# Patient Record
Sex: MALE | Race: WHITE | NOT HISPANIC OR LATINO | Employment: OTHER | URBAN - NONMETROPOLITAN AREA
[De-identification: names, ages, dates, MRNs, and addresses within clinical notes are randomized per-mention and may not be internally consistent; named-entity substitution may affect disease eponyms.]

---

## 2017-09-29 ENCOUNTER — OFFICE VISIT (OUTPATIENT)
Dept: INTERNAL MEDICINE | Facility: CLINIC | Age: 62
End: 2017-09-29

## 2017-09-29 VITALS
HEART RATE: 85 BPM | DIASTOLIC BLOOD PRESSURE: 60 MMHG | TEMPERATURE: 97.3 F | HEIGHT: 70 IN | SYSTOLIC BLOOD PRESSURE: 112 MMHG | WEIGHT: 252 LBS | BODY MASS INDEX: 36.08 KG/M2 | OXYGEN SATURATION: 98 %

## 2017-09-29 DIAGNOSIS — Z02.4 ENCOUNTER FOR CDL (COMMERCIAL DRIVING LICENSE) EXAM: Primary | ICD-10-CM

## 2017-09-29 DIAGNOSIS — R81 GLUCOSE FOUND IN URINE ON EXAMINATION: ICD-10-CM

## 2017-09-29 DIAGNOSIS — R73.09 ELEVATED HEMOGLOBIN A1C: ICD-10-CM

## 2017-09-29 LAB
BILIRUB BLD-MCNC: NEGATIVE MG/DL
CLARITY, POC: CLEAR
COLOR UR: ABNORMAL
GLUCOSE UR STRIP-MCNC: ABNORMAL MG/DL
HBA1C MFR BLD: 6.7 %
KETONES UR QL: NEGATIVE
LEUKOCYTE EST, POC: NEGATIVE
NITRITE UR-MCNC: NEGATIVE MG/ML
PH UR: 5 [PH] (ref 5–8)
PROT UR STRIP-MCNC: NEGATIVE MG/DL
RBC # UR STRIP: NEGATIVE /UL
SP GR UR: 1.02 (ref 1–1.03)
UROBILINOGEN UR QL: NORMAL

## 2017-09-29 PROCEDURE — 81003 URINALYSIS AUTO W/O SCOPE: CPT | Performed by: FAMILY MEDICINE

## 2017-09-29 PROCEDURE — DOTPHY: Performed by: FAMILY MEDICINE

## 2017-09-29 PROCEDURE — 83036 HEMOGLOBIN GLYCOSYLATED A1C: CPT | Performed by: FAMILY MEDICINE

## 2017-09-30 NOTE — PROGRESS NOTES
Subjective    Senate MARILYNN Capellan is a 62 y.o. 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 This year he came prepared, has a note from cardiologist saying he's okay from a heart standpoint to drive. No new health issues since last exam. 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 Normally goes to Dr. Sanchez for primary care. Has not had labs in some time.    See cdl papers in scanned media for further history.    The following portions of the patient's history were reviewed and updated as appropriate: allergies, current medications, past medical history, past social history, past surgical history and problem list.    Review of Systems   Constitutional: Negative for activity change.   HENT: Positive for hearing loss.    Eyes: Negative for visual disturbance.   Respiratory: Negative for shortness of breath.    Cardiovascular: Negative for chest pain.   Gastrointestinal: Negative for abdominal pain.   Musculoskeletal: Positive for arthralgias (toe pain, kicked something and some pain since then).   Neurological: Negative for weakness and numbness.       Vitals:    09/29/17 0826   BP: 112/60   Pulse: 85   Temp: 97.3 °F (36.3 °C)   SpO2: 98%       Objective   Physical Exam   Constitutional: He is oriented to person, place, and time. Vital signs are normal. He appears well-developed and well-nourished. He is active.  Non-toxic appearance. He does not have a sickly appearance. He does not appear ill.   Appears stated age. Well groomed. obese.   HENT:   Head: Normocephalic and atraumatic.   Right Ear: Hearing, tympanic membrane, external ear and ear canal normal.   Left Ear: Hearing, tympanic membrane, external ear and ear canal normal.   Nose: Nose normal.   Mouth/Throat: Uvula is midline, oropharynx is clear and  moist and mucous membranes are normal.   Hears a forced whisper at 5 ft bilaterally   Eyes: Conjunctivae, EOM and lids are normal. Pupils are equal, round, and reactive to light.   Wearing glasses. Passes colorblindness test. Normal peripheral field vision.   Neck: Phonation normal. Neck supple. No thyromegaly present.   Cardiovascular: Normal rate, regular rhythm and normal heart sounds.    No murmur heard.  Pulses:       Radial pulses are 2+ on the right side, and 2+ on the left side.   Pulmonary/Chest: Effort normal and breath sounds normal.   Abdominal: Soft. Bowel sounds are normal. He exhibits no distension and no mass. There is no tenderness. There is no rebound and no guarding. Hernia confirmed negative in the right inguinal area and confirmed negative in the left inguinal area.   Genitourinary: Penis normal. Right testis shows no tenderness. Left testis shows no tenderness. Circumcised.   Genitourinary Comments: Narcisa Drummond, CMA, chaperone   Musculoskeletal: Normal range of motion. He exhibits no edema or deformity.        Right hand: He exhibits no deformity.        Left hand: He exhibits no deformity.        Right foot: There is no deformity.        Left foot: There is no deformity.      During the foot exam he had a monofilament test performed.    Neurological Sensory Findings -  Unaltered sharp/dull right ankle/foot discrimination and unaltered sharp/dull left ankle/foot discrimination.    Vascular Status -  His exam exhibits no right foot edema. His exam exhibits no left foot edema.   Skin Integrity  -  His right foot skin is intact.     Senate 's left foot skin is intact. .  Lymphadenopathy:     He has no cervical adenopathy.   Neurological: He is alert and oriented to person, place, and time. He has normal strength. He displays no tremor. No cranial nerve deficit or sensory deficit (able to feel monofilament to both big toes though on the right, at the very tip, it's decreased (same foot he kicked  something with)). Gait normal.   Skin: Skin is warm and dry. He is not diaphoretic. No cyanosis. Nails show no clubbing.   Thickened, yellowed toenails.   Psychiatric: He has a normal mood and affect. His speech is normal and behavior is normal. Judgment and thought content normal. Cognition and memory are normal.   Nursing note and vitals reviewed.    Office Visit on 09/29/2017   Component Date Value Ref Range Status   • Color 09/29/2017 Dark Yellow  Yellow, Straw, Dark Yellow, Aileen Final   • Clarity, UA 09/29/2017 Clear  Clear Final   • Glucose, UA 09/29/2017 >=1000 mg/dL (3+)* Negative, 1000 mg/dL (3+) mg/dL Final   • Bilirubin 09/29/2017 Negative  Negative Final   • Ketones, UA 09/29/2017 Negative  Negative Final   • Specific Gravity  09/29/2017 1.020  1.005 - 1.030 Final   • Blood, UA 09/29/2017 Negative  Negative Final   • pH, Urine 09/29/2017 5.0  5.0 - 8.0 Final   • Protein, POC 09/29/2017 Negative  Negative mg/dL Final   • Urobilinogen, UA 09/29/2017 Normal  Normal Final   • Leukocytes 09/29/2017 Negative  Negative Final   • Nitrite, UA 09/29/2017 Negative  Negative Final   • Hemoglobin A1C 09/29/2017 6.7  % Final         Assessment/Plan   Senate was seen today for 's license exam.    Diagnoses and all orders for this visit:    Encounter for CDL (commercial driving license) exam  -     POC Urinalysis Dipstick, Automated    Glucose found in urine on examination  -     POC Glycosylated Hemoglobin (Hb A1C)    Elevated hemoglobin A1c    one year given for CDL license. Discussed elevated glucose in urine, which led to the A1C measurement. Technically a new diabetic with A1C > 6.5. I provided information on this and asked him to follow up with his PCP. The A1C needs to be reassessed in three months. I encouraged healthier dietary habits.           Cindy Zhang MD

## 2017-12-15 ENCOUNTER — OFFICE VISIT (OUTPATIENT)
Dept: INTERNAL MEDICINE | Facility: CLINIC | Age: 62
End: 2017-12-15

## 2017-12-15 VITALS
HEART RATE: 65 BPM | DIASTOLIC BLOOD PRESSURE: 80 MMHG | OXYGEN SATURATION: 98 % | WEIGHT: 235 LBS | BODY MASS INDEX: 33.64 KG/M2 | HEIGHT: 70 IN | SYSTOLIC BLOOD PRESSURE: 120 MMHG | TEMPERATURE: 97.5 F

## 2017-12-15 DIAGNOSIS — R73.09 ABNORMAL GLUCOSE: ICD-10-CM

## 2017-12-15 DIAGNOSIS — I10 ESSENTIAL HYPERTENSION: Primary | ICD-10-CM

## 2017-12-15 DIAGNOSIS — I48.20 ATRIAL FIBRILLATION, CHRONIC (HCC): ICD-10-CM

## 2017-12-15 LAB — HBA1C MFR BLD: 6.7 %

## 2017-12-15 PROCEDURE — 99214 OFFICE O/P EST MOD 30 MIN: CPT | Performed by: INTERNAL MEDICINE

## 2017-12-15 PROCEDURE — 83036 HEMOGLOBIN GLYCOSYLATED A1C: CPT | Performed by: INTERNAL MEDICINE

## 2017-12-15 NOTE — PROGRESS NOTES
"Nicholas Capelaln is a 62 y.o. male    HPI coming in for follow-up has hypertension chronic atrial fibrillation on Eliquis. Hyperglycemia however has managed some weight loss with dietary restrictions    The following portions of the patient's history were reviewed and updated as appropriate: allergies, current medications, past family history, past medical history, past social history, past surgical history, and problem list.     Review of Systems   Constitutional: Negative.  Negative for activity change, appetite change, fatigue and fever.   HENT: Negative for congestion, ear discharge, ear pain and trouble swallowing.    Eyes: Negative for photophobia and visual disturbance.   Respiratory: Negative for cough and shortness of breath.    Cardiovascular: Negative for chest pain and palpitations.   Gastrointestinal: Negative for abdominal distention, abdominal pain, constipation, diarrhea, nausea and vomiting.   Endocrine: Negative.    Genitourinary: Negative for dysuria, hematuria and urgency.   Musculoskeletal: Positive for arthralgias. Negative for back pain, joint swelling and myalgias.   Skin: Negative for color change and rash.   Allergic/Immunologic: Negative.    Neurological: Negative for dizziness, weakness, light-headedness and headaches.   Hematological: Negative for adenopathy. Does not bruise/bleed easily.   Psychiatric/Behavioral: Negative for agitation, confusion and dysphoric mood. The patient is not nervous/anxious.        Visit Vitals   • /80   • Pulse 65   • Temp 97.5 °F (36.4 °C)   • Ht 177.8 cm (70\")   • Wt 107 kg (235 lb)   • SpO2 98%   • BMI 33.72 kg/m2       Objective  Physical Exam   Constitutional: He is oriented to person, place, and time. He appears well-developed and well-nourished. No distress.   HENT:   Nose: Nose normal.   Mouth/Throat: Oropharynx is clear and moist.   Eyes: Conjunctivae and EOM are normal. No scleral icterus.   Neck: No tracheal deviation present. No " thyromegaly present.   Cardiovascular: Normal rate and regular rhythm.  Exam reveals no friction rub.    No murmur heard.  Pulmonary/Chest: No respiratory distress. He has no wheezes. He has no rales.   Abdominal: Soft. He exhibits no distension and no mass. There is no tenderness. There is no guarding.   Musculoskeletal: Normal range of motion. He exhibits no deformity.   Lymphadenopathy:     He has no cervical adenopathy.   Neurological: He is alert and oriented to person, place, and time. He has normal reflexes. No cranial nerve deficit. Coordination normal.   Skin: Skin is warm and dry. No rash noted. No erythema.   Psychiatric: He has a normal mood and affect. His behavior is normal. Judgment and thought content normal.       Diagnoses and all orders for this visit:    Essential hypertension stable with current meds and low-salt diet    Atrial fibrillation, chronic stable continue with beta blockers for rate control and anticoagulant therapy    Abnormal glucose. A1c okay continue with dietary restrictions discussed further weight loss dietary brochure is given

## 2018-01-03 ENCOUNTER — TELEPHONE (OUTPATIENT)
Dept: FAMILY MEDICINE CLINIC | Facility: CLINIC | Age: 63
End: 2018-01-03

## 2018-01-03 NOTE — TELEPHONE ENCOUNTER
----- Message from Jeff Sanchez MD sent at 1/3/2018 10:47 AM EST -----  Please inform patient labs are okay.

## 2018-06-15 ENCOUNTER — OFFICE VISIT (OUTPATIENT)
Dept: INTERNAL MEDICINE | Facility: CLINIC | Age: 63
End: 2018-06-15

## 2018-06-15 VITALS
HEART RATE: 67 BPM | SYSTOLIC BLOOD PRESSURE: 120 MMHG | TEMPERATURE: 97.5 F | BODY MASS INDEX: 32.78 KG/M2 | DIASTOLIC BLOOD PRESSURE: 70 MMHG | OXYGEN SATURATION: 98 % | HEIGHT: 70 IN | WEIGHT: 229 LBS

## 2018-06-15 DIAGNOSIS — R73.09 ABNORMAL GLUCOSE: ICD-10-CM

## 2018-06-15 DIAGNOSIS — Z23 NEED FOR VACCINATION: Primary | ICD-10-CM

## 2018-06-15 DIAGNOSIS — M25.551 PAIN OF RIGHT HIP JOINT: ICD-10-CM

## 2018-06-15 DIAGNOSIS — R35.1 NOCTURIA: ICD-10-CM

## 2018-06-15 DIAGNOSIS — I48.20 ATRIAL FIBRILLATION, CHRONIC (HCC): ICD-10-CM

## 2018-06-15 DIAGNOSIS — I10 ESSENTIAL HYPERTENSION: ICD-10-CM

## 2018-06-15 DIAGNOSIS — E78.5 DYSLIPIDEMIA: ICD-10-CM

## 2018-06-15 PROCEDURE — G0009 ADMIN PNEUMOCOCCAL VACCINE: HCPCS | Performed by: INTERNAL MEDICINE

## 2018-06-15 PROCEDURE — G0402 INITIAL PREVENTIVE EXAM: HCPCS | Performed by: INTERNAL MEDICINE

## 2018-06-15 PROCEDURE — 90670 PCV13 VACCINE IM: CPT | Performed by: INTERNAL MEDICINE

## 2018-06-15 PROCEDURE — 96160 PT-FOCUSED HLTH RISK ASSMT: CPT | Performed by: INTERNAL MEDICINE

## 2018-06-15 NOTE — PROGRESS NOTES
QUICK REFERENCE INFORMATION:  The ABCs of the Annual Wellness Visit    [place] to Medicare Visit    HEALTH RISK ASSESSMENT  63-year-old male with hypertension and chronic atrial fibrillation on anticoagulant therapy has had elevated blood sugar in the past has dyslipidemia along with right-sided hip pain secondary to DJD. He is coming in for wellness visit  1955    Recent Hospitalizations:  No hospitalization(s) within the last year..      Current Medical Providers:  Patient Care Team:  Jeff Sanchez MD as PCP - General      Smoking Status:  History   Smoking Status   • Former Smoker   Smokeless Tobacco   • Never Used       Alcohol Consumption:  History   Alcohol Use No       Depression Screen:   PHQ-2/PHQ-9 Depression Screening 6/15/2018   Little interest or pleasure in doing things 0   Feeling down, depressed, or hopeless 0   Total Score 0       Health Habits and Functional and Cognitive Screening:  Functional & Cognitive Status 6/15/2018   Do you have difficulty preparing food and eating? No   Do you have difficulty bathing yourself, getting dressed or grooming yourself? No   Do you have difficulty moving around from place to place? No   Do you have trouble with steps or getting out of a bed or a chair? No   In the past year have you fallen or experienced a near fall? No   Current Diet Well Balanced Diet   Dental Exam Not up to date   Eye Exam Up to date   Exercise (times per week) 2 times per week   Current Exercise Activities Include Cardiovasular Workout on Exercise Equipment   Do you need help using the phone?  No   Are you deaf or do you have serious difficulty hearing?  No   Do you need help with transportation? No   Do you need help shopping? No   Do you need help preparing meals?  No   Do you need help with housework?  No   Do you need help with laundry? No   Do you need help taking your medications? No   Do you need help managing money? No   Do you ever drive or ride in a car without wearing a  seat belt? No   Have you felt unusual stress, anger or loneliness in the last month? No   Who do you live with? Spouse   If you need help, do you have trouble finding someone available to you? No   Have you been bothered in the last four weeks by sexual problems? No   Do you have difficulty concentrating, remembering or making decisions? No           Does the patient have evidence of cognitive impairment? No    Aspirin use counseling? Taking ASA appropriately as indicated      Recent Lab Results:  CMP:  Lab Results   Component Value Date    BUN 21 05/08/2015    CREATININE 0.7 05/08/2015     05/08/2015    K 4.4 05/08/2015    CO2 32 (H) 05/08/2015    CALCIUM 9.6 05/08/2015    ALBUMIN 4.3 05/08/2015    BILITOT 0.6 05/08/2015    ALKPHOS 56 05/08/2015    AST 24 05/08/2015    ALT 30 05/08/2015     Lipid Panel:     HbA1c:  Lab Results   Component Value Date    HGBA1C 6.7 12/15/2017       Visual Acuity:  No exam data present    Age-appropriate Screening Schedule:  Refer to the list below for future screening recommendations based on patient's age, sex and/or medical conditions. Orders for these recommended tests are listed in the plan section. The patient has been provided with a written plan.    Health Maintenance   Topic Date Due   • TDAP/TD VACCINES (1 - Tdap) 05/11/1972   • ZOSTER VACCINE (1 of 2) 05/11/2003   • LIPID PANEL  09/30/2017   • PNEUMOCOCCAL VACCINES (65+ LOW/MEDIUM RISK) (1 of 2 - PCV13) 05/11/2018   • INFLUENZA VACCINE  08/01/2018   • COLONOSCOPY  01/07/2023        Subjective   History of Present Illness    Yehuda Capellan is a 63 y.o. male an established patient presenting for a Welcome to Medicare Visit.     The following portions of the patient's history were reviewed and updated as appropriate: allergies, current medications, past family history, past medical history, past social history, past surgical history and problem list.    Outpatient Medications Prior to Visit   Medication Sig Dispense  Refill   • aspirin 81 MG tablet Take  by mouth Daily.     • ELIQUIS 5 MG tablet tablet      • losartan-hydrochlorothiazide (HYZAAR) 100-12.5 MG per tablet Take  by mouth.     • metoprolol tartrate (LOPRESSOR) 50 MG tablet Take  by mouth.     • spironolactone (ALDACTONE) 25 MG tablet Take  by mouth.       No facility-administered medications prior to visit.        Patient Active Problem List   Diagnosis   • Abnormal glucose   • Atrial fibrillation, chronic   • Dyslipidemia   • Essential hypertension   • Joint pain, hip       Advance Care Planning:  has NO advance directive - information provided to the patient today    Identification of Risk Factors:  Risk factors include: weight , chronic pain and polypharmacy.    Review of Systems   Constitutional: Negative.  Negative for activity change, appetite change, fatigue and fever.   HENT: Negative for congestion, ear discharge, ear pain and trouble swallowing.    Eyes: Negative for photophobia and visual disturbance.   Respiratory: Negative for cough and shortness of breath.    Cardiovascular: Negative for chest pain and palpitations.   Gastrointestinal: Negative for abdominal distention, abdominal pain, constipation, diarrhea, nausea and vomiting.   Endocrine: Negative.    Genitourinary: Positive for difficulty urinating. Negative for dysuria, hematuria and urgency.   Musculoskeletal: Positive for arthralgias. Negative for back pain, joint swelling and myalgias.   Skin: Negative for color change and rash.   Allergic/Immunologic: Negative.    Neurological: Negative for dizziness, weakness, light-headedness and headaches.   Hematological: Negative for adenopathy. Does not bruise/bleed easily.   Psychiatric/Behavioral: Positive for sleep disturbance. Negative for agitation, confusion and dysphoric mood. The patient is not nervous/anxious.        Compared to one year ago, the patient feels his physical health is better.  Compared to one year ago, the patient feels his mental  "health is better.    Objective    Physical Exam   Constitutional: He is oriented to person, place, and time. He appears well-developed and well-nourished. No distress.   HENT:   Nose: Nose normal.   Mouth/Throat: Oropharynx is clear and moist.   Eyes: Conjunctivae and EOM are normal. No scleral icterus.   Neck: No tracheal deviation present. No thyromegaly present.   Cardiovascular: Normal rate and regular rhythm.  Exam reveals no friction rub.    No murmur heard.  Pulmonary/Chest: No respiratory distress. He has no wheezes. He has no rales.   Abdominal: Soft. He exhibits no distension and no mass. There is no tenderness. There is no guarding.   Musculoskeletal: Normal range of motion. He exhibits deformity.   Lymphadenopathy:     He has no cervical adenopathy.   Neurological: He is alert and oriented to person, place, and time. He has normal reflexes. No cranial nerve deficit. Coordination normal.   Skin: Skin is warm and dry. No rash noted. No erythema.   Psychiatric: He has a normal mood and affect. His behavior is normal. Judgment and thought content normal.       Vitals:    06/15/18 1301   BP: 120/70   Pulse: 67   Temp: 97.5 °F (36.4 °C)   SpO2: 98%   Weight: 104 kg (229 lb)   Height: 177.8 cm (70\")   PainSc: 0-No pain       Patient's Body mass index is 32.86 kg/m². BMI is above normal parameters. Recommendations include: exercise counseling and nutrition counseling.      Procedure   Procedures       Assessment/Plan   Patient Self-Management and Personalized Health Advice  The patient has been provided with information about: diet, exercise, weight management, fall prevention and designing advance directives and preventive services including:   · Exercise counseling provided, Fall Risk assessment done, Fall Risk plan of care done, Nutrition counseling provided.    Visit Diagnoses:    ICD-10-CM ICD-9-CM   1. Need for vaccination Z23 V05.9   2. Essential hypertension. Stable with current meds and low-salt diet  " I10 401.9   3. Dyslipidemia. Continue with the dietary restrictions  E78.5 272.4   4. Atrial fibrillation, chronic. Rate control okay continue anticoagulant therapy  I48.2 427.31   5. Abnormal glucose. Discussed diet check A1c  R73.09 790.29   6. Pain of right hip joint. Continue with home exercises NSAIDs when necessary only  M25.551 719.45       Orders Placed This Encounter   Procedures   • Pneumococcal Conjugate Vaccine 13-Valent All       Outpatient Encounter Prescriptions as of 6/15/2018   Medication Sig Dispense Refill   • aspirin 81 MG tablet Take  by mouth Daily.     • ELIQUIS 5 MG tablet tablet      • losartan-hydrochlorothiazide (HYZAAR) 100-12.5 MG per tablet Take  by mouth.     • metoprolol tartrate (LOPRESSOR) 50 MG tablet Take  by mouth.     • spironolactone (ALDACTONE) 25 MG tablet Take  by mouth.       No facility-administered encounter medications on file as of 6/15/2018.        Reviewed use of high risk medication in the elderly: yes  Reviewed for potential of harmful drug interactions in the elderly: yes    Follow Up:  No Follow-up on file.     An After Visit Summary and PPPS with all of these plans were given to the patient.

## 2018-06-15 NOTE — PATIENT INSTRUCTIONS
Medicare Wellness  Personal Prevention Plan of Service     Date of Office Visit:  06/15/2018  Encounter Provider:  Jeff Sanchez MD  Place of Service:  [facility]  Patient Name: Yehuda Capellan  :  1955    As part of the Medicare Wellness portion of your visit today, we are providing you with this personalized preventive plan of services (PPPS). This plan is based upon recommendations of the [place] Preventive Services Task Force (USPSTF) and the Advisory Committee on Immunization Practices (ACIP).    This lists the preventive care services that should be considered, and provides dates of when you are due. Items listed as completed are up-to-date and do not require any further intervention.    Health Maintenance   Topic Date Due   • TDAP/TD VACCINES (1 - Tdap) 1972   • ZOSTER VACCINE (1 of 2) 2003   • HEPATITIS C SCREENING  10/04/2016   • MEDICARE ANNUAL WELLNESS  10/04/2016   • LIPID PANEL  2017   • PNEUMOCOCCAL VACCINES (65+ LOW/MEDIUM RISK) (1 of 2 - PCV13) 2018   • AAA SCREEN (ONE-TIME)  06/15/2018   • INFLUENZA VACCINE  2018   • COLONOSCOPY  2023       Orders Placed This Encounter   Procedures   • Pneumococcal Conjugate Vaccine 13-Valent All   • Hemoglobin A1c   • PSA DIAGNOSTIC   • Comprehensive Metabolic Panel       No Follow-up on file.

## 2018-06-16 LAB
ALBUMIN SERPL-MCNC: 4.4 G/DL (ref 3.5–5)
ALBUMIN/GLOB SERPL: 1.4 G/DL (ref 1–2)
ALP SERPL-CCNC: 96 U/L (ref 38–126)
ALT SERPL-CCNC: 50 U/L (ref 13–69)
AST SERPL-CCNC: 33 U/L (ref 15–46)
BILIRUB SERPL-MCNC: 0.9 MG/DL (ref 0.2–1.3)
BUN SERPL-MCNC: 19 MG/DL (ref 7–20)
BUN/CREAT SERPL: 27.1 (ref 6.3–21.9)
CALCIUM SERPL-MCNC: 10 MG/DL (ref 8.4–10.2)
CHLORIDE SERPL-SCNC: 98 MMOL/L (ref 98–107)
CO2 SERPL-SCNC: 29 MMOL/L (ref 26–30)
CREAT SERPL-MCNC: 0.7 MG/DL (ref 0.6–1.3)
GFR SERPLBLD CREATININE-BSD FMLA CKD-EPI: 113 ML/MIN/1.73
GFR SERPLBLD CREATININE-BSD FMLA CKD-EPI: 137 ML/MIN/1.73
GLOBULIN SER CALC-MCNC: 3.1 GM/DL
GLUCOSE SERPL-MCNC: 288 MG/DL (ref 74–98)
HBA1C MFR BLD: 10.2 %
POTASSIUM SERPL-SCNC: 4.8 MMOL/L (ref 3.5–5.1)
PROT SERPL-MCNC: 7.5 G/DL (ref 6.3–8.2)
PSA SERPL-MCNC: 0.76 NG/ML (ref 0.06–4)
SODIUM SERPL-SCNC: 138 MMOL/L (ref 137–145)

## 2018-09-10 ENCOUNTER — TELEPHONE (OUTPATIENT)
Dept: INTERNAL MEDICINE | Facility: CLINIC | Age: 63
End: 2018-09-10

## 2018-09-10 ENCOUNTER — CLINICAL SUPPORT (OUTPATIENT)
Dept: INTERNAL MEDICINE | Facility: CLINIC | Age: 63
End: 2018-09-10

## 2018-09-10 VITALS
DIASTOLIC BLOOD PRESSURE: 75 MMHG | WEIGHT: 223 LBS | HEART RATE: 59 BPM | TEMPERATURE: 98 F | BODY MASS INDEX: 31.92 KG/M2 | OXYGEN SATURATION: 98 % | SYSTOLIC BLOOD PRESSURE: 120 MMHG | HEIGHT: 70 IN

## 2018-09-10 DIAGNOSIS — Z02.4 ENCOUNTER FOR CDL (COMMERCIAL DRIVING LICENSE) EXAM: Primary | ICD-10-CM

## 2018-09-10 DIAGNOSIS — E11.65 UNCONTROLLED TYPE 2 DIABETES MELLITUS WITH HYPERGLYCEMIA, WITHOUT LONG-TERM CURRENT USE OF INSULIN (HCC): Primary | ICD-10-CM

## 2018-09-10 DIAGNOSIS — E11.9 NEWLY DIAGNOSED DIABETES (HCC): ICD-10-CM

## 2018-09-10 LAB
BILIRUB BLD-MCNC: NEGATIVE MG/DL
CLARITY, POC: CLEAR
COLOR UR: YELLOW
GLUCOSE UR STRIP-MCNC: ABNORMAL MG/DL
KETONES UR QL: NEGATIVE
LEUKOCYTE EST, POC: NEGATIVE
NITRITE UR-MCNC: NEGATIVE MG/ML
PH UR: 7 [PH] (ref 5–8)
PROT UR STRIP-MCNC: NEGATIVE MG/DL
RBC # UR STRIP: NEGATIVE /UL
SP GR UR: 1 (ref 1–1.03)
UROBILINOGEN UR QL: NORMAL

## 2018-09-10 PROCEDURE — 81003 URINALYSIS AUTO W/O SCOPE: CPT | Performed by: FAMILY MEDICINE

## 2018-09-10 PROCEDURE — DOTPHY: Performed by: FAMILY MEDICINE

## 2018-09-10 RX ORDER — CETIRIZINE HYDROCHLORIDE 10 MG/1
TABLET ORAL DAILY
COMMUNITY
Start: 2018-07-23

## 2018-09-10 NOTE — PROGRESS NOTES
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 Mr. Yehuda Capellan has hypertension and afib. Yehuda Capellan's form has been reviewed and can be found in scanned media.    Review of Systems - General ROS: positive for  - sleep disturbance and weight loss  Cardiovascular ROS: no chest pain or dyspnea on exertion  Genito-Urinary ROS: positive for - nocturia and polyuria    Vitals:    09/10/18 1134   BP: 120/75   Pulse: 59   Temp: 98 °F (36.7 °C)   SpO2: 98%       Physical Exam   Constitutional: He is oriented to person, place, and time. Vital signs are normal. He appears well-developed and well-nourished. He is active.  Non-toxic appearance. He does not have a sickly appearance. He does not appear ill. No distress.   HENT:   Head: Normocephalic and atraumatic.   Right Ear: Hearing, tympanic membrane, external ear and ear canal normal.   Left Ear: Hearing, tympanic membrane, external ear and ear canal normal.   Nose: Nose normal.   Mouth/Throat: Uvula is midline, oropharynx is clear and moist and mucous membranes are normal. Abnormal dentition.   Eyes: Pupils are equal, round, and reactive to light. Conjunctivae, EOM and lids are normal.   Neck: Phonation normal. Neck supple. No thyromegaly present.   Cardiovascular: Normal rate, regular rhythm and normal heart sounds.    No murmur heard.  Pulses:       Radial pulses are 2+ on the right side, and 2+ on the left side.   Pulmonary/Chest: Effort normal and breath sounds normal.   Abdominal: Soft. Bowel sounds are normal. He exhibits no distension and no mass. There is no tenderness. There is no rebound and no guarding. Hernia confirmed negative in the right inguinal area and confirmed negative in the left inguinal area.   Musculoskeletal: Normal range of motion. He exhibits no edema or deformity.       Neurological Sensory Findings -  Unaltered sharp/dull right ankle/foot  discrimination and unaltered sharp/dull left ankle/foot discrimination.  Lymphadenopathy:     He has no cervical adenopathy.   Neurological: He is alert and oriented to person, place, and time. He has normal strength. He displays no tremor. No cranial nerve deficit or sensory deficit. Gait normal.   Skin: Skin is warm and dry. He is not diaphoretic. No cyanosis. Nails show no clubbing.   Psychiatric: He has a normal mood and affect. His speech is normal and behavior is normal. Judgment and thought content normal. Cognition and memory are normal.   Nursing note and vitals reviewed.      Brief Urine Lab Results  (Last result in the past 365 days)      Color   Clarity   Blood   Leuk Est   Nitrite   Protein   CREAT   Urine HCG        09/10/18 1156 Yellow Clear Negative Negative Negative Negative             Lab Results   Component Value Date    HGBA1C 10.20 06/15/2018         Vision: visual acuity better than 20/40, color vision intact, normal peripheral vision, with correction and no monocular vision.    Hearing: passed in both ears with forced whisper at 5 feet      Yehuda Capellan presents for CDL exam.    Meets standards, but periodic monitoring required due to hypertension, atrial fibrillation.   qualified only for 1 year.    · Follow up with PCP as needed/directed.  · See scanned form for further information.  · Data logged with [facility].    Yehuda was seen today for annual exam.    Diagnoses and all orders for this visit:    Encounter for CDL (commercial driving license) exam  -     POCT urinalysis dipstick, automated    Newly diagnosed diabetes (CMS/[facility])      Needs to follow up with PCP in regards to diabetes mellitus. New diagnosis.

## 2018-09-10 NOTE — TELEPHONE ENCOUNTER
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 Last year his A1C was mildly elevated when I did his DOT exam. I reviewed his chart and found his A1C from his last visit with you, was elevated at 10.2. Patient was not aware but asked for treatment. I instructed him to follow up with you as scheduled in December. I provided info on diabetes mellitus and lifestyle changes, diet. Discussed metformin, potential side effects. Renal function okay on last labs. Sending Rx to [facility] where he goes and Rx will be free. He's going to start on 1000 mg daily x 1 week then up to bid. Not starting insulin, this will disqualify him from further DOT renewals. I wanted to keep you in the loop on Mr. Capellan.

## 2018-09-14 ENCOUNTER — OFFICE VISIT (OUTPATIENT)
Dept: INTERNAL MEDICINE | Facility: CLINIC | Age: 63
End: 2018-09-14

## 2018-09-14 VITALS
HEIGHT: 70 IN | HEART RATE: 68 BPM | BODY MASS INDEX: 31.9 KG/M2 | WEIGHT: 222.8 LBS | TEMPERATURE: 97.4 F | OXYGEN SATURATION: 98 %

## 2018-09-14 DIAGNOSIS — E11.65 UNCONTROLLED TYPE 2 DIABETES MELLITUS WITH HYPERGLYCEMIA, WITHOUT LONG-TERM CURRENT USE OF INSULIN (HCC): ICD-10-CM

## 2018-09-14 DIAGNOSIS — I48.20 ATRIAL FIBRILLATION, CHRONIC (HCC): Primary | ICD-10-CM

## 2018-09-14 DIAGNOSIS — I10 ESSENTIAL HYPERTENSION: ICD-10-CM

## 2018-09-14 PROCEDURE — 99214 OFFICE O/P EST MOD 30 MIN: CPT | Performed by: INTERNAL MEDICINE

## 2018-09-14 NOTE — PROGRESS NOTES
"Subjective  Senjanae Capellan is a 63 y.o. male    HPI coming in for follow-up patient with atrial fibrillation and hypertension elevated A1c noted poor compliance with diet and exercise recently started on metformin which she is not taken any yet. He is accompanied by his wife was giving us some of the history    The following portions of the patient's history were reviewed and updated as appropriate: allergies, current medications, past family history, past medical history, past social history, past surgical history, and problem list.     Review of Systems   Constitutional: Positive for fatigue. Negative for activity change, appetite change and fever.   HENT: Negative for congestion, ear discharge, ear pain and trouble swallowing.    Eyes: Negative for photophobia and visual disturbance.   Respiratory: Negative for cough and shortness of breath.    Cardiovascular: Negative for chest pain and palpitations.   Gastrointestinal: Negative for abdominal distention, abdominal pain, constipation, diarrhea, nausea and vomiting.   Endocrine: Negative.    Genitourinary: Negative for dysuria, hematuria and urgency.   Musculoskeletal: Positive for arthralgias. Negative for back pain, joint swelling and myalgias.   Skin: Negative for color change and rash.   Allergic/Immunologic: Negative.    Neurological: Negative for dizziness, weakness, light-headedness and headaches.   Hematological: Negative for adenopathy. Does not bruise/bleed easily.   Psychiatric/Behavioral: Negative for agitation, confusion and dysphoric mood. The patient is not nervous/anxious.        Visit Vitals  Pulse 68   Temp 97.4 °F (36.3 °C)   Ht 177.8 cm (70\")   Wt 101 kg (222 lb 12.8 oz)   SpO2 98%   BMI 31.97 kg/m²       Objective  Physical Exam   Constitutional: He is oriented to person, place, and time. He appears well-developed and well-nourished. No distress.   HENT:   Nose: Nose normal.   Mouth/Throat: Oropharynx is clear and moist.   Eyes: Conjunctivae " and EOM are normal. No scleral icterus.   Neck: No tracheal deviation present. No thyromegaly present.   Cardiovascular: Normal rate and regular rhythm.  Exam reveals no friction rub.    No murmur heard.  Pulmonary/Chest: No respiratory distress. He has no wheezes. He has no rales.   Abdominal: Soft. He exhibits no distension and no mass. There is no tenderness. There is no guarding.   Musculoskeletal: Normal range of motion. He exhibits deformity.   Lymphadenopathy:     He has no cervical adenopathy.   Neurological: He is alert and oriented to person, place, and time. He has normal reflexes. No cranial nerve deficit. Coordination normal.   Skin: Skin is warm and dry. No rash noted. No erythema.   Psychiatric: He has a normal mood and affect. His behavior is normal. Judgment and thought content normal.       Diagnoses and all orders for this visit:    Atrial fibrillation, chronic (CMS/[facility]) rate control okay continue with anticoagulant therapy    Essential hypertension stable with current meds    Uncontrolled type 2 diabetes mellitus with hyperglycemia, without long-term current use of insulin. Counseled patient in detail along with his wife. Discussed diet brochure is given referral to diabetes education program start metformin gradually titrate dose up. Discussed exercise program and weight loss follow-up again in a month with Accu-Chek readings (CMS/[facility])

## 2018-10-15 ENCOUNTER — OFFICE VISIT (OUTPATIENT)
Dept: INTERNAL MEDICINE | Facility: CLINIC | Age: 63
End: 2018-10-15

## 2018-10-15 VITALS
SYSTOLIC BLOOD PRESSURE: 110 MMHG | OXYGEN SATURATION: 98 % | BODY MASS INDEX: 31.64 KG/M2 | HEIGHT: 70 IN | TEMPERATURE: 97.4 F | DIASTOLIC BLOOD PRESSURE: 70 MMHG | WEIGHT: 221 LBS | HEART RATE: 88 BPM

## 2018-10-15 DIAGNOSIS — I48.20 ATRIAL FIBRILLATION, CHRONIC (HCC): Primary | ICD-10-CM

## 2018-10-15 DIAGNOSIS — I10 ESSENTIAL HYPERTENSION: ICD-10-CM

## 2018-10-15 DIAGNOSIS — E11.65 CONTROLLED TYPE 2 DIABETES MELLITUS WITH HYPERGLYCEMIA, WITHOUT LONG-TERM CURRENT USE OF INSULIN (HCC): ICD-10-CM

## 2018-10-15 PROCEDURE — 99214 OFFICE O/P EST MOD 30 MIN: CPT | Performed by: INTERNAL MEDICINE

## 2018-10-15 NOTE — PROGRESS NOTES
"Subjective  Senate MARILYNN Capellan is a 63 y.o. male    HPI coming in for follow-up patient with A. fib and hypertension noted to have significantly elevated blood sugars for which she was placed on metformin he is more compliant with diet and exercise has brought in his blood sugar readings with him which show good control. He has been to the diabetes education class also    The following portions of the patient's history were reviewed and updated as appropriate: allergies, current medications, past family history, past medical history, past social history, past surgical history, and problem list.     Review of Systems   Constitutional: Positive for fatigue. Negative for activity change, appetite change and fever.   HENT: Negative for congestion, ear discharge, ear pain and trouble swallowing.    Eyes: Negative for photophobia and visual disturbance.   Respiratory: Negative for cough and shortness of breath.    Cardiovascular: Negative for chest pain and palpitations.   Gastrointestinal: Negative for abdominal distention, abdominal pain, constipation, diarrhea, nausea and vomiting.   Endocrine: Negative.    Genitourinary: Negative for dysuria, hematuria and urgency.   Musculoskeletal: Positive for arthralgias. Negative for back pain, joint swelling and myalgias.   Skin: Negative for color change and rash.   Allergic/Immunologic: Negative.    Neurological: Negative for dizziness, weakness, light-headedness and headaches.   Hematological: Negative for adenopathy. Does not bruise/bleed easily.   Psychiatric/Behavioral: Negative for agitation, confusion and dysphoric mood. The patient is not nervous/anxious.        Visit Vitals  /70   Pulse 88   Temp 97.4 °F (36.3 °C)   Ht 177.8 cm (70\")   Wt 100 kg (221 lb)   SpO2 98%   BMI 31.71 kg/m²       Objective  Physical Exam   Constitutional: He is oriented to person, place, and time. He appears well-developed and well-nourished. No distress.   HENT:   Nose: Nose normal. "   Mouth/Throat: Oropharynx is clear and moist.   Eyes: Conjunctivae and EOM are normal. No scleral icterus.   Neck: No tracheal deviation present. No thyromegaly present.   Cardiovascular: Normal rate and regular rhythm.  Exam reveals no friction rub.    No murmur heard.  Pulmonary/Chest: No respiratory distress. He has no wheezes. He has no rales.   Abdominal: Soft. He exhibits no distension and no mass. There is no tenderness. There is no guarding.   Musculoskeletal: Normal range of motion. He exhibits deformity.   Lymphadenopathy:     He has no cervical adenopathy.   Neurological: He is alert and oriented to person, place, and time. He has normal reflexes. No cranial nerve deficit. Coordination normal.   Skin: Skin is warm and dry. No rash noted. No erythema.   Psychiatric: He has a normal mood and affect. His behavior is normal. Judgment and thought content normal.       Diagnoses and all orders for this visit:    Atrial fibrillation, chronic (CMS/[facility]) rate controlled okay continue with anticoagulant therapy    Essential hypertension stable    Controlled type 2 diabetes mellitus with hyperglycemia, without long-term current use of insulin continue with the dietary restrictions. Check A1c with next visit (CMS/[facility])

## 2018-11-30 ENCOUNTER — OFFICE VISIT (OUTPATIENT)
Dept: INTERNAL MEDICINE | Facility: CLINIC | Age: 63
End: 2018-11-30

## 2018-11-30 VITALS
OXYGEN SATURATION: 98 % | DIASTOLIC BLOOD PRESSURE: 74 MMHG | BODY MASS INDEX: 31.21 KG/M2 | HEART RATE: 82 BPM | SYSTOLIC BLOOD PRESSURE: 124 MMHG | TEMPERATURE: 98.6 F | WEIGHT: 218 LBS | HEIGHT: 70 IN

## 2018-11-30 DIAGNOSIS — E11.65 UNCONTROLLED TYPE 2 DIABETES MELLITUS WITH HYPERGLYCEMIA, WITHOUT LONG-TERM CURRENT USE OF INSULIN (HCC): ICD-10-CM

## 2018-11-30 LAB — HBA1C MFR BLD: 6.4 %

## 2018-11-30 PROCEDURE — 99213 OFFICE O/P EST LOW 20 MIN: CPT | Performed by: INTERNAL MEDICINE

## 2018-11-30 NOTE — PROGRESS NOTES
"Subjective  Senjanae Capellan is a 63 y.o. male    HPI coming in for follow-up on his diabetes has been on a strict diet and brought in his Accu-Chek readings which show good control he stopped taking metformin for about 3 weeks because of diarrhea. Has atrial fibrillation with mild congestive heart failure    The following portions of the patient's history were reviewed and updated as appropriate: allergies, current medications, past family history, past medical history, past social history, past surgical history, and problem list.     Review of Systems   Constitutional: Negative.  Negative for activity change, appetite change, fatigue and fever.   HENT: Negative for congestion, ear discharge, ear pain and trouble swallowing.    Eyes: Negative for photophobia and visual disturbance.   Respiratory: Negative for cough and shortness of breath.    Cardiovascular: Negative for chest pain and palpitations.   Gastrointestinal: Negative for abdominal distention, abdominal pain, constipation, diarrhea, nausea and vomiting.   Endocrine: Negative.    Genitourinary: Negative for dysuria, hematuria and urgency.   Musculoskeletal: Positive for arthralgias. Negative for back pain, joint swelling and myalgias.   Skin: Negative for color change and rash.   Allergic/Immunologic: Negative.    Neurological: Negative for dizziness, weakness, light-headedness and headaches.   Hematological: Negative for adenopathy. Does not bruise/bleed easily.   Psychiatric/Behavioral: Negative for agitation, confusion and dysphoric mood. The patient is not nervous/anxious.        Visit Vitals  /74   Pulse 82   Temp 98.6 °F (37 °C) (Oral)   Ht 177.8 cm (70\")   Wt 98.9 kg (218 lb)   SpO2 98%   BMI 31.28 kg/m²       Objective  Physical Exam   Constitutional: He is oriented to person, place, and time. He appears well-developed and well-nourished. No distress.   HENT:   Nose: Nose normal.   Mouth/Throat: Oropharynx is clear and moist.   Eyes: " Conjunctivae and EOM are normal. No scleral icterus.   Neck: No tracheal deviation present. No thyromegaly present.   Cardiovascular: Normal rate and regular rhythm. Exam reveals no friction rub.   No murmur heard.  Pulmonary/Chest: No respiratory distress. He has no wheezes. He has no rales.   Abdominal: Soft. He exhibits no distension and no mass. There is no tenderness. There is no guarding.   Musculoskeletal: Normal range of motion. He exhibits deformity.   Lymphadenopathy:     He has no cervical adenopathy.   Neurological: He is alert and oriented to person, place, and time. He has normal reflexes. No cranial nerve deficit. Coordination normal.   Skin: Skin is warm and dry. No rash noted. No erythema.   Psychiatric: He has a normal mood and affect. His behavior is normal. Judgment and thought content normal.       Diagnoses and all orders for this visit:    Uncontrolled type 2 diabetes mellitus with hyperglycemia, without long-term current use of insulin (CMS/[facility]). Continue with the dietary restrictions resume at metformin once a day check A1c today continue with weight loss and exercise program  -     metFORMIN (GLUCOPHAGE) 1000 MG tablet; Take 1 tablet by mouth 2 (Two) Times a Day With Meals.  -     Hemoglobin A1c

## 2018-12-26 RX ORDER — BLOOD-GLUCOSE METER
KIT MISCELLANEOUS
Qty: 1 EACH | Refills: 1 | Status: SHIPPED | OUTPATIENT
Start: 2018-12-26 | End: 2021-01-18 | Stop reason: SDUPTHER

## 2019-03-20 ENCOUNTER — OFFICE VISIT (OUTPATIENT)
Dept: INTERNAL MEDICINE | Facility: CLINIC | Age: 64
End: 2019-03-20

## 2019-03-20 VITALS
TEMPERATURE: 97.9 F | WEIGHT: 219.25 LBS | HEIGHT: 70 IN | HEART RATE: 75 BPM | OXYGEN SATURATION: 98 % | DIASTOLIC BLOOD PRESSURE: 77 MMHG | SYSTOLIC BLOOD PRESSURE: 112 MMHG | BODY MASS INDEX: 31.39 KG/M2

## 2019-03-20 DIAGNOSIS — E11.65 TYPE 2 DIABETES MELLITUS WITH HYPERGLYCEMIA, WITHOUT LONG-TERM CURRENT USE OF INSULIN (HCC): Primary | ICD-10-CM

## 2019-03-20 DIAGNOSIS — I10 ESSENTIAL HYPERTENSION: ICD-10-CM

## 2019-03-20 LAB — HBA1C MFR BLD: 5.9 % (ref 4.8–5.6)

## 2019-03-20 PROCEDURE — 99213 OFFICE O/P EST LOW 20 MIN: CPT | Performed by: PHYSICIAN ASSISTANT

## 2019-03-20 NOTE — PROGRESS NOTES
Chief Complaint   Patient presents with   • Blood Sugar Problem     pt states it stays between  other than that no problems or concerns        Subjective   Senjanae Capellan is a 63 y.o. male    History of Present Illness     Patient presents today for follow-up on hypertension and diabetes.   Random blood glucose readings have been around 120-125.  He has been compliant with metformin 500 mg twice a day.  He is also been practicing diabetic diet and monitoring his carb intake.  He denies any increased thirst, increased urination, nausea or diarrhea.    Initially with a higher dose of metformin patient was having abdominal cramping and diarrhea.  He reports that with the decreased dose of metformin this has resolved.  She reports that blood pressure has been well controlled.  He has been compliant with his medications.  He denies any dizziness or headaches.    Past Medical History:   Diagnosis Date   • Atrial fibrillation (CMS/HCC)    • Elevated glucose 09/29/2017    A1C 6.7   • Hyperlipidemia      Past Surgical History:   Procedure Laterality Date   • HAND SURGERY      LEFT PINKY SEWN BACK ON      History reviewed. No pertinent family history.  Social History     Socioeconomic History   • Marital status:      Spouse name: Not on file   • Number of children: Not on file   • Years of education: Not on file   • Highest education level: Not on file   Tobacco Use   • Smoking status: Former Smoker   • Smokeless tobacco: Never Used   Substance and Sexual Activity   • Alcohol use: No   • Drug use: No     No Known Allergies      Review of Systems   Constitutional: Negative for appetite change, diaphoresis, fatigue, unexpected weight gain and unexpected weight loss.   Eyes: Negative for visual disturbance.   Respiratory: Negative for chest tightness and shortness of breath.    Cardiovascular: Negative for chest pain, palpitations and leg swelling.   Gastrointestinal: Negative for diarrhea, nausea and vomiting.    Endocrine: Negative for polydipsia, polyphagia and polyuria.   Musculoskeletal: Positive for arthralgias (chronic hip and back problems.  ).   Skin: Negative for color change.   Neurological: Negative for dizziness, weakness, light-headedness and numbness.     Objective     Vitals:    03/20/19 0941   BP: 112/77   Pulse: 75   Temp: 97.9 °F (36.6 °C)   SpO2: 98%       Physical Exam   Constitutional: He is oriented to person, place, and time. He appears well-developed and well-nourished. No distress.   HENT:   Head: Normocephalic and atraumatic.   Eyes: Conjunctivae and EOM are normal. Pupils are equal, round, and reactive to light.   Neck: Normal range of motion. Neck supple. No JVD present.   Cardiovascular: Normal rate, regular rhythm and normal heart sounds. Exam reveals no gallop and no friction rub.   No murmur heard.  Pulmonary/Chest: Effort normal and breath sounds normal. No respiratory distress. He has no wheezes. He has no rales.   Musculoskeletal: Normal range of motion. He exhibits no edema.   Neurological: He is alert and oriented to person, place, and time.   Skin: Skin is warm and dry. Capillary refill takes less than 2 seconds. He is not diaphoretic.   Psychiatric: He has a normal mood and affect. His behavior is normal.   Nursing note and vitals reviewed.      Results for orders placed or performed in visit on 03/20/19   Hemoglobin A1c   Result Value Ref Range    Hemoglobin A1C 5.90 (H) 4.80 - 5.60 %       Assessment/Plan     Senate was seen today for blood sugar problem.    Diagnoses and all orders for this visit:    Type 2 diabetes mellitus with hyperglycemia, without long-term current use of insulin (CMS/[facility])  -     Obtain A1c today.  Continue metformin twice daily.  Continue diabetic diet.  We will follow-up in 3 months to monitor A1c.  -     Hemoglobin A1c    Essential hypertension        -     Controlled.  Continue current medications.      Return in about 3 months (around 6/20/2019) for  Lilliam.    Alexandria Ramirez PA-C

## 2019-04-24 ENCOUNTER — OFFICE VISIT (OUTPATIENT)
Dept: INTERNAL MEDICINE | Facility: CLINIC | Age: 64
End: 2019-04-24

## 2019-04-24 VITALS
RESPIRATION RATE: 18 BRPM | SYSTOLIC BLOOD PRESSURE: 116 MMHG | OXYGEN SATURATION: 98 % | DIASTOLIC BLOOD PRESSURE: 72 MMHG | HEIGHT: 70 IN | BODY MASS INDEX: 30.92 KG/M2 | HEART RATE: 66 BPM | TEMPERATURE: 97.9 F | WEIGHT: 216 LBS

## 2019-04-24 DIAGNOSIS — R19.7 DIARRHEA OF PRESUMED INFECTIOUS ORIGIN: Primary | ICD-10-CM

## 2019-04-24 DIAGNOSIS — R10.9 ABDOMINAL CRAMPING: ICD-10-CM

## 2019-04-24 DIAGNOSIS — K92.1 MELENA: ICD-10-CM

## 2019-04-24 PROCEDURE — 99213 OFFICE O/P EST LOW 20 MIN: CPT | Performed by: PHYSICIAN ASSISTANT

## 2019-04-24 RX ORDER — DICYCLOMINE HCL 20 MG
20 TABLET ORAL EVERY 8 HOURS
Qty: 12 TABLET | Refills: 0 | Status: SHIPPED | OUTPATIENT
Start: 2019-04-24 | End: 2019-06-20

## 2019-04-24 NOTE — PROGRESS NOTES
Chief Complaint   Patient presents with   • Diarrhea     started on 04/20/2019   • GI Problem       Subjective   Yehuda Capellan is a 63 y.o. male    History of Present Illness     Diarrhea:  Symptoms began Friday night, patient reports that he began feeling poorly and had watery diarrhea.  He had body aches, chills, and fever Saturday.  He has continued to have watery diarrhea over that several days.  He report 4-5 small episodes of diarrhea each day.  Associated mild abdominal cramps.  He has noted a few episodes of black/dark colored stools and green colored stools.  Denies nausea/vomiting.  Symptoms are not worsening.  The stools are intermittently clumpy and watery now.  He reports that he has had several close friends with similar symptoms.  Symptoms are beginning to interfere with work, and he requests something to help alleviate the cramping.    Past Medical History:   Diagnosis Date   • Atrial fibrillation (CMS/HCC)    • Elevated glucose 09/29/2017    A1C 6.7   • Hyperlipidemia      Past Surgical History:   Procedure Laterality Date   • HAND SURGERY      LEFT PINKY SEWN BACK ON      History reviewed. No pertinent family history.  Social History     Socioeconomic History   • Marital status:      Spouse name: Not on file   • Number of children: Not on file   • Years of education: Not on file   • Highest education level: Not on file   Tobacco Use   • Smoking status: Former Smoker   • Smokeless tobacco: Never Used   Substance and Sexual Activity   • Alcohol use: No   • Drug use: No     Allergies   Allergen Reactions   • Sulfa Antibiotics Unknown (See Comments)     Patient states he had allergic reaction to sulfa drugs when he was a child.     Review of Systems   Constitutional: Positive for appetite change, chills, fatigue and fever. Negative for activity change.   Respiratory: Negative for cough and shortness of breath.    Cardiovascular: Negative for chest pain.   Gastrointestinal: Positive for  abdominal pain and diarrhea. Negative for anal bleeding, nausea and vomiting.        Melena   Musculoskeletal: Positive for arthralgias. Negative for joint swelling and myalgias.   Neurological: Negative for dizziness, light-headedness and headache.     Objective     Vitals:    04/24/19 0948   BP: 116/72   Pulse: 66   Resp: 18   Temp: 97.9 °F (36.6 °C)   SpO2: 98%       Physical Exam   Constitutional: He is oriented to person, place, and time. He appears well-developed and well-nourished. No distress.   Non-toxic appearing male patient.  Pleasant and talkative.  Energetic.    HENT:   Head: Normocephalic and atraumatic.   Eyes: Conjunctivae and EOM are normal. Pupils are equal, round, and reactive to light.   Neck: Normal range of motion. Neck supple. No JVD present.   Cardiovascular: Normal rate, regular rhythm and normal heart sounds. Exam reveals no gallop and no friction rub.   No murmur heard.  Pulmonary/Chest: Effort normal and breath sounds normal. No respiratory distress. He has no wheezes. He has no rales.   Abdominal: Soft. Bowel sounds are normal. He exhibits no distension. There is generalized tenderness.   Musculoskeletal: Normal range of motion. He exhibits no edema.   Neurological: He is alert and oriented to person, place, and time.   Skin: Skin is warm and dry. Capillary refill takes less than 2 seconds. He is not diaphoretic.   Psychiatric: He has a normal mood and affect. His behavior is normal.   Nursing note and vitals reviewed.      Assessment/Plan     Yehuda was seen today for diarrhea and gi problem.    Diagnoses and all orders for this visit:    Diarrhea of presumed infectious origin    Abdominal cramping  -     dicyclomine (BENTYL) 20 MG tablet; Take 1 tablet by mouth Every 8 (Eight) Hours.    Melena  -     POCT Occult blood x 3, stool; Future    Most likely gastroenteritis as several other friends have had similar symptoms.  Push fluids, encouraged bowel rest, bland diet, avoid spicy foods.   Can use Bentyl as needed every 8 hours for abdominal cramping.  Will obtain FOBT, as patient reports he had a few episodes of black tarry stools.  Patient has history of anticoagulation with Eliquis and takes daily aspirin.  Have advised patient that if symptoms are not improving or if they worsen return to clinic.  Consider stool studies if symptoms are persistent.    Return if symptoms worsen or fail to improve.    Alexandria Ramirez PA-C

## 2019-06-20 ENCOUNTER — OFFICE VISIT (OUTPATIENT)
Dept: INTERNAL MEDICINE | Facility: CLINIC | Age: 64
End: 2019-06-20

## 2019-06-20 VITALS
TEMPERATURE: 97 F | SYSTOLIC BLOOD PRESSURE: 111 MMHG | WEIGHT: 213 LBS | HEIGHT: 70 IN | BODY MASS INDEX: 30.49 KG/M2 | OXYGEN SATURATION: 97 % | HEART RATE: 71 BPM | DIASTOLIC BLOOD PRESSURE: 63 MMHG

## 2019-06-20 DIAGNOSIS — M25.551 PAIN OF RIGHT HIP JOINT: ICD-10-CM

## 2019-06-20 DIAGNOSIS — I48.20 ATRIAL FIBRILLATION, CHRONIC (HCC): ICD-10-CM

## 2019-06-20 DIAGNOSIS — I10 ESSENTIAL HYPERTENSION: Primary | ICD-10-CM

## 2019-06-20 DIAGNOSIS — E11.65 TYPE 2 DIABETES MELLITUS WITH HYPERGLYCEMIA, WITHOUT LONG-TERM CURRENT USE OF INSULIN (HCC): ICD-10-CM

## 2019-06-20 PROCEDURE — G0439 PPPS, SUBSEQ VISIT: HCPCS | Performed by: INTERNAL MEDICINE

## 2019-06-20 PROCEDURE — 99397 PER PM REEVAL EST PAT 65+ YR: CPT | Performed by: INTERNAL MEDICINE

## 2019-06-20 PROCEDURE — 96160 PT-FOCUSED HLTH RISK ASSMT: CPT | Performed by: INTERNAL MEDICINE

## 2019-06-20 NOTE — PATIENT INSTRUCTIONS
Medicare Wellness  Personal Prevention Plan of Service     Date of Office Visit:  2019  Encounter Provider:  Jeff Sanchez MD  Place of Service:  [facility]  Patient Name: Yehuda Capellan  :  1955    As part of the Medicare Wellness portion of your visit today, we are providing you with this personalized preventive plan of services (PPPS). This plan is based upon recommendations of the [place] Preventive Services Task Force (USPSTF) and the Advisory Committee on Immunization Practices (ACIP).    This lists the preventive care services that should be considered, and provides dates of when you are due. Items listed as completed are up-to-date and do not require any further intervention.    Health Maintenance   Topic Date Due   • URINE MICROALBUMIN  1955   • TDAP/TD VACCINES (1 - Tdap) 1972   • ZOSTER VACCINE (2 of 3) 10/24/2014   • HEPATITIS C SCREENING  10/04/2016   • DIABETIC FOOT EXAM  10/04/2016   • DIABETIC EYE EXAM  10/04/2016   • LIPID PANEL  2017   • AAA SCREEN (ONE-TIME)  06/15/2018   • MEDICARE ANNUAL WELLNESS  06/15/2019   • PNEUMOCOCCAL VACCINES (65+ LOW/MEDIUM RISK) (2 of 2 - PPSV23) 06/15/2019   • INFLUENZA VACCINE  2019   • HEMOGLOBIN A1C  2019   • COLONOSCOPY  2023       Orders Placed This Encounter   Procedures   • Hemoglobin A1c   • Comprehensive Metabolic Panel       No Follow-up on file.

## 2019-06-20 NOTE — PROGRESS NOTES
QUICK REFERENCE INFORMATION:  The ABCs of the Annual Wellness Visit    [facility]    HEALTH RISK ASSESSMENT  63-year-old male with hypertension and chronic atrial fibrillation on anticoagulant therapy has had elevated blood sugar in the past has dyslipidemia along with right-sided hip pain secondary to DJD.  He has a history of type 2 diabetes currently on metformin has managed weight loss is brought in his Accu-Chek readings which show much better control he is coming in for wellness visit  1955    Recent Hospitalizations:  No hospitalization(s) within the last year..      Current Medical Providers:  Patient Care Team:  Jeff Sanchez MD as PCP - General      Smoking Status:  Social History     Tobacco Use   Smoking Status Former Smoker   Smokeless Tobacco Never Used       Alcohol Consumption:  Social History     Substance and Sexual Activity   Alcohol Use No       Depression Screen:   PHQ-2/PHQ-9 Depression Screening 6/20/2019   Little interest or pleasure in doing things 0   Feeling down, depressed, or hopeless 0   Total Score 0       Health Habits and Functional and Cognitive Screening:  Functional & Cognitive Status 6/20/2019   Do you have difficulty preparing food and eating? No   Do you have difficulty bathing yourself, getting dressed or grooming yourself? No   Do you have difficulty using the toilet? No   Do you have difficulty moving around from place to place? Yes   Do you have trouble with steps or getting out of a bed or a chair? No   In the past year have you fallen or experienced a near fall? Yes   Current Diet Diabetic Diet   Dental Exam (No Data)   Eye Exam Not up to date   Exercise (times per week) 7 times per week   Current Exercise Activities Include Walking   Do you need help using the phone?  No   Are you deaf or do you have serious difficulty hearing?  Yes   Do you need help with transportation? No   Do you need help shopping? No   Do you need help preparing meals?  No    Do you need help with housework?  No   Do you need help with laundry? No   Do you need help taking your medications? No   Do you need help managing money? No   Do you ever drive or ride in a car without wearing a seat belt? No   Have you felt unusual stress, anger or loneliness in the last month? No   Who do you live with? Spouse   If you need help, do you have trouble finding someone available to you? No   Have you been bothered in the last four weeks by sexual problems? -   Do you have difficulty concentrating, remembering or making decisions? Yes           Does the patient have evidence of cognitive impairment? No    Aspirin use counseling? Taking ASA appropriately as indicated      Recent Lab Results:  CMP:  Lab Results   Component Value Date     (H) 06/15/2018    BUN 19 06/15/2018    CREATININE 0.70 06/15/2018    EGFRIFNONA 113 06/15/2018    EGFRIFAFRI 137 06/15/2018    BCR 27.1 (H) 06/15/2018     06/15/2018    K 4.8 06/15/2018    CO2 29.0 06/15/2018    CALCIUM 10.0 06/15/2018    PROTENTOTREF 7.5 06/15/2018    ALBUMIN 4.40 06/15/2018    LABGLOBREF 3.1 06/15/2018    LABIL2 1.4 06/15/2018    BILITOT 0.9 06/15/2018    ALKPHOS 96 06/15/2018    AST 33 06/15/2018    ALT 50 06/15/2018     Lipid Panel:     HbA1c:  Lab Results   Component Value Date    HGBA1C 5.90 (H) 03/20/2019       Visual Acuity:  No exam data present    Age-appropriate Screening Schedule:  Refer to the list below for future screening recommendations based on patient's age, sex and/or medical conditions. Orders for these recommended tests are listed in the plan section. The patient has been provided with a written plan.    Health Maintenance   Topic Date Due   • URINE MICROALBUMIN  1955   • TDAP/TD VACCINES (1 - Tdap) 05/11/1972   • ZOSTER VACCINE (2 of 3) 10/24/2014   • DIABETIC FOOT EXAM  10/04/2016   • DIABETIC EYE EXAM  10/04/2016   • LIPID PANEL  09/30/2017   • PNEUMOCOCCAL VACCINES (65+ LOW/MEDIUM RISK) (2 of 2 - PPSV23)  06/15/2019   • INFLUENZA VACCINE  08/01/2019   • HEMOGLOBIN A1C  09/20/2019   • COLONOSCOPY  01/07/2023        Subjective   History of Present Illness    Yehuda Capellan is a 64 y.o. male an established patient presenting for a Welcome to Medicare Visit.     The following portions of the patient's history were reviewed and updated as appropriate: allergies, current medications, past family history, past medical history, past social history, past surgical history and problem list.    Outpatient Medications Prior to Visit   Medication Sig Dispense Refill   • aspirin 81 MG tablet Take  by mouth Daily.     • cetirizine (zyrTEC) 10 MG tablet      • dicyclomine (BENTYL) 20 MG tablet Take 1 tablet by mouth Every 8 (Eight) Hours. 12 tablet 0   • ELIQUIS 5 MG tablet tablet      • glucose blood test strip TEST TWICE A DAY FOR DM. E11.9 200 each 3   • glucose blood test strip Use as instructed 100 each 3   • glucose monitor monitoring kit ACCU-CHECK ABISAI PLUS METER as requested. 1 each 1   • Lancets Misc. misc TEST TWICE A DAY FOR DM. E11.9 200 each 3   • losartan-hydrochlorothiazide (HYZAAR) 100-12.5 MG per tablet Take  by mouth.     • Melatonin 5 MG/15ML liquid      • metFORMIN (GLUCOPHAGE) 1000 MG tablet Take 1 tablet by mouth 2 (Two) Times a Day With Meals. 180 tablet 3   • metoprolol tartrate (LOPRESSOR) 50 MG tablet Take  by mouth.     • spironolactone (ALDACTONE) 25 MG tablet Take  by mouth.       No facility-administered medications prior to visit.        Patient Active Problem List   Diagnosis   • Atrial fibrillation, chronic (CMS/HCC) stable rate control okay on Eliquis   • Dyslipidemia continue with the current medications and dietary restrictions   • Essential hypertension stable   • Joint pain, hip with evidence of DJD on x-ray about 5 years ago continue with conservative measures for now   • Type 2 diabetes mellitus with hyperglycemia, without long-term current use of insulin (CMS/[facility]) recent A1c shows good  control       Advance Care Planning:  has NO advance directive - information provided to the patient today    Identification of Risk Factors:  Risk factors include: weight , chronic pain and polypharmacy.    Review of Systems   Constitutional: Negative.  Negative for activity change, appetite change, fatigue and fever.   HENT: Negative for congestion, ear discharge, ear pain and trouble swallowing.    Eyes: Negative for photophobia and visual disturbance.   Respiratory: Negative for cough and shortness of breath.    Cardiovascular: Negative for chest pain and palpitations.   Gastrointestinal: Negative for abdominal distention, abdominal pain, constipation, diarrhea, nausea and vomiting.   Endocrine: Negative.    Genitourinary: Positive for difficulty urinating. Negative for dysuria, hematuria and urgency.   Musculoskeletal: Positive for arthralgias. Negative for back pain, joint swelling and myalgias.   Skin: Negative for color change and rash.   Allergic/Immunologic: Negative.    Neurological: Negative for dizziness, weakness, light-headedness and headaches.   Hematological: Negative for adenopathy. Does not bruise/bleed easily.   Psychiatric/Behavioral: Positive for sleep disturbance. Negative for agitation, confusion and dysphoric mood. The patient is not nervous/anxious.        Compared to one year ago, the patient feels his physical health is better.  Compared to one year ago, the patient feels his mental health is better.    Objective    Physical Exam   Constitutional: He is oriented to person, place, and time. He appears well-developed and well-nourished. No distress.   HENT:   Nose: Nose normal.   Mouth/Throat: Oropharynx is clear and moist.   Eyes: Conjunctivae and EOM are normal. No scleral icterus.   Neck: No tracheal deviation present. No thyromegaly present.   Cardiovascular: Normal rate and regular rhythm. Exam reveals no friction rub.   No murmur heard.  Pulmonary/Chest: No respiratory distress. He  "has no wheezes. He has no rales.   Abdominal: Soft. He exhibits no distension and no mass. There is no tenderness. There is no guarding.   Musculoskeletal: Normal range of motion. He exhibits deformity.   Lymphadenopathy:     He has no cervical adenopathy.   Neurological: He is alert and oriented to person, place, and time. He has normal reflexes. No cranial nerve deficit. Coordination normal.   Skin: Skin is warm and dry. No rash noted. No erythema.   Psychiatric: He has a normal mood and affect. His behavior is normal. Judgment and thought content normal.       Vitals:    06/20/19 1051   BP: 111/63   Pulse: 71   Temp: 97 °F (36.1 °C)   SpO2: 97%   Weight: 96.6 kg (213 lb)   Height: 177.8 cm (70\")   PainSc:   6       Patient's Body mass index is 30.56 kg/m². BMI is above normal parameters. Recommendations include: exercise counseling and nutrition counseling.      Procedure   Procedures       Assessment/Plan   Patient Self-Management and Personalized Health Advice  The patient has been provided with information about: diet, exercise, weight management, fall prevention and designing advance directives and preventive services including:   · Exercise counseling provided, Fall Risk assessment done, Fall Risk plan of care done, Nutrition counseling provided.        No orders of the defined types were placed in this encounter.      Outpatient Encounter Medications as of 6/20/2019   Medication Sig Dispense Refill   • aspirin 81 MG tablet Take  by mouth Daily.     • cetirizine (zyrTEC) 10 MG tablet      • dicyclomine (BENTYL) 20 MG tablet Take 1 tablet by mouth Every 8 (Eight) Hours. 12 tablet 0   • ELIQUIS 5 MG tablet tablet      • glucose blood test strip TEST TWICE A DAY FOR DM. E11.9 200 each 3   • glucose blood test strip Use as instructed 100 each 3   • glucose monitor monitoring kit ACCU-CHECK ABISAI PLUS METER as requested. 1 each 1   • Lancets Misc. misc TEST TWICE A DAY FOR DM. E11.9 200 each 3   • " losartan-hydrochlorothiazide (HYZAAR) 100-12.5 MG per tablet Take  by mouth.     • Melatonin 5 MG/15ML liquid      • metFORMIN (GLUCOPHAGE) 1000 MG tablet Take 1 tablet by mouth 2 (Two) Times a Day With Meals. 180 tablet 3   • metoprolol tartrate (LOPRESSOR) 50 MG tablet Take  by mouth.     • spironolactone (ALDACTONE) 25 MG tablet Take  by mouth.       No facility-administered encounter medications on file as of 6/20/2019.        Reviewed use of high risk medication in the elderly: yes  Reviewed for potential of harmful drug interactions in the elderly: yes    Follow Up:  No Follow-up on file.     An After Visit Summary and PPPS with all of these plans were given to the patient.

## 2019-08-16 ENCOUNTER — OFFICE VISIT (OUTPATIENT)
Dept: INTERNAL MEDICINE | Facility: CLINIC | Age: 64
End: 2019-08-16

## 2019-08-16 VITALS
TEMPERATURE: 97.2 F | DIASTOLIC BLOOD PRESSURE: 76 MMHG | BODY MASS INDEX: 30.61 KG/M2 | SYSTOLIC BLOOD PRESSURE: 126 MMHG | HEIGHT: 70 IN | OXYGEN SATURATION: 95 % | HEART RATE: 58 BPM | WEIGHT: 213.8 LBS

## 2019-08-16 DIAGNOSIS — E11.65 TYPE 2 DIABETES MELLITUS WITH HYPERGLYCEMIA, WITHOUT LONG-TERM CURRENT USE OF INSULIN (HCC): ICD-10-CM

## 2019-08-16 DIAGNOSIS — S89.91XA INJURY OF RIGHT LOWER EXTREMITY, INITIAL ENCOUNTER: Primary | ICD-10-CM

## 2019-08-16 DIAGNOSIS — I48.20 ATRIAL FIBRILLATION, CHRONIC (HCC): ICD-10-CM

## 2019-08-16 LAB — HBA1C MFR BLD: 5.8 %

## 2019-08-16 PROCEDURE — 99214 OFFICE O/P EST MOD 30 MIN: CPT | Performed by: INTERNAL MEDICINE

## 2019-08-16 PROCEDURE — 83036 HEMOGLOBIN GLYCOSYLATED A1C: CPT | Performed by: INTERNAL MEDICINE

## 2019-08-16 NOTE — PROGRESS NOTES
Subjective  Yehuda Capellan is a 64 y.o. male    HPI coming in for evaluation he is accompanied by his wife recent accidental fall about 3 days ago with abrasion over his right shin with accompanying swelling just below that.  Has some discomfort.  He is able to bear weight easily on the leg.  He denies fever or chills.  Has been cleaning the wound regularly.  Complains of abdominal discomfort and diarrhea especially when he takes metformin.  Stopped taking it for about 2 months and symptoms have resolved.  He has type 2 diabetes is controlling this currently with diet and exercise.  He is on anticoagulant therapy for atrial fibrillation    The following portions of the patient's history were reviewed and updated as appropriate: allergies, current medications, past family history, past medical history, past social history, past surgical history, and problem list.     Review of Systems   Constitutional: Negative.  Negative for activity change, appetite change, fatigue and fever.   HENT: Negative for congestion, ear discharge, ear pain and trouble swallowing.    Eyes: Negative for photophobia and visual disturbance.   Respiratory: Negative for cough and shortness of breath.    Cardiovascular: Negative for chest pain and palpitations.   Gastrointestinal: Negative for abdominal distention, abdominal pain, constipation, diarrhea, nausea and vomiting.   Endocrine: Negative.    Genitourinary: Negative for dysuria, hematuria and urgency.   Musculoskeletal: Positive for arthralgias. Negative for back pain, joint swelling and myalgias.   Skin: Positive for rash and wound. Negative for color change.   Allergic/Immunologic: Negative.    Neurological: Negative for dizziness, weakness, light-headedness and headaches.   Hematological: Negative for adenopathy. Does not bruise/bleed easily.   Psychiatric/Behavioral: Negative for agitation, confusion and dysphoric mood. The patient is not nervous/anxious.        Visit Vitals  BP  "126/76 Comment: Automatic   Pulse 58   Temp 97.2 °F (36.2 °C) (Temporal)   Ht 177.8 cm (70\")   Wt 97 kg (213 lb 12.8 oz)   SpO2 95%   BMI 30.68 kg/m²       Objective  Physical Exam   Constitutional: He is oriented to person, place, and time. He appears well-developed and well-nourished. No distress.   HENT:   Nose: Nose normal.   Mouth/Throat: Oropharynx is clear and moist.   Eyes: Conjunctivae and EOM are normal. No scleral icterus.   Neck: No tracheal deviation present. No thyromegaly present.   Cardiovascular: Normal rate and regular rhythm. Exam reveals no friction rub.   No murmur heard.  Pulmonary/Chest: No respiratory distress. He has no wheezes. He has no rales.   Abdominal: Soft. He exhibits no distension and no mass. There is no tenderness. There is no guarding.   Musculoskeletal: Normal range of motion. He exhibits no deformity.   Lymphadenopathy:     He has no cervical adenopathy.   Neurological: He is alert and oriented to person, place, and time. He has normal reflexes. No cranial nerve deficit. Coordination normal.   Skin: Skin is warm and dry. No rash noted. No erythema.        Superficial ulcer   Psychiatric: He has a normal mood and affect. His behavior is normal. Judgment and thought content normal.       Diagnoses and all orders for this visit:    Injury of right lower extremity, initial encounter discussed wound care.  Today the wound was clean and adequately dressed in the clinic.  Has a hematoma which will be carefully observed.  Continue with anticoagulant therapy    Type 2 diabetes mellitus with hyperglycemia, without long-term current use of insulin (CMS/[facility]) A1c today shows good control continue with the dietary restrictions he is being taken off metformin    Atrial fibrillation, chronic (CMS/[facility]) rate control okay on anticoagulant therapy        "

## 2019-10-01 RX ORDER — GLIMEPIRIDE 1 MG/1
0.5 TABLET ORAL
Qty: 30 TABLET | Refills: 5 | Status: SHIPPED | OUTPATIENT
Start: 2019-10-01 | End: 2020-01-14 | Stop reason: SDUPTHER

## 2019-10-28 ENCOUNTER — FLU SHOT (OUTPATIENT)
Dept: INTERNAL MEDICINE | Facility: CLINIC | Age: 64
End: 2019-10-28

## 2019-10-28 DIAGNOSIS — Z23 NEED FOR INFLUENZA VACCINATION: Primary | ICD-10-CM

## 2019-10-28 PROCEDURE — G0008 ADMIN INFLUENZA VIRUS VAC: HCPCS | Performed by: NURSE PRACTITIONER

## 2019-10-28 PROCEDURE — 90653 IIV ADJUVANT VACCINE IM: CPT | Performed by: NURSE PRACTITIONER

## 2020-01-14 RX ORDER — GLIMEPIRIDE 1 MG/1
0.5 TABLET ORAL
Qty: 90 TABLET | Refills: 3 | Status: SHIPPED | OUTPATIENT
Start: 2020-01-14 | End: 2020-08-07 | Stop reason: SDUPTHER

## 2020-02-26 ENCOUNTER — OFFICE VISIT (OUTPATIENT)
Dept: INTERNAL MEDICINE | Facility: CLINIC | Age: 65
End: 2020-02-26

## 2020-02-26 VITALS
TEMPERATURE: 97.6 F | HEART RATE: 78 BPM | OXYGEN SATURATION: 96 % | DIASTOLIC BLOOD PRESSURE: 84 MMHG | HEIGHT: 70 IN | BODY MASS INDEX: 32.75 KG/M2 | WEIGHT: 228.8 LBS | SYSTOLIC BLOOD PRESSURE: 125 MMHG

## 2020-02-26 DIAGNOSIS — E11.65 TYPE 2 DIABETES MELLITUS WITH HYPERGLYCEMIA, WITHOUT LONG-TERM CURRENT USE OF INSULIN (HCC): ICD-10-CM

## 2020-02-26 DIAGNOSIS — Z23 NEED FOR VACCINATION: Primary | ICD-10-CM

## 2020-02-26 DIAGNOSIS — I10 ESSENTIAL HYPERTENSION: ICD-10-CM

## 2020-02-26 DIAGNOSIS — I48.20 ATRIAL FIBRILLATION, CHRONIC (HCC): ICD-10-CM

## 2020-02-26 PROCEDURE — 99214 OFFICE O/P EST MOD 30 MIN: CPT | Performed by: INTERNAL MEDICINE

## 2020-02-26 PROCEDURE — 90732 PPSV23 VACC 2 YRS+ SUBQ/IM: CPT | Performed by: INTERNAL MEDICINE

## 2020-02-26 PROCEDURE — G0009 ADMIN PNEUMOCOCCAL VACCINE: HCPCS | Performed by: INTERNAL MEDICINE

## 2020-02-26 RX ORDER — TRAZODONE HYDROCHLORIDE 50 MG/1
50 TABLET ORAL NIGHTLY
Qty: 90 TABLET | Refills: 3 | Status: SHIPPED | OUTPATIENT
Start: 2020-02-26 | End: 2020-08-07 | Stop reason: SDUPTHER

## 2020-02-26 RX ORDER — ATORVASTATIN CALCIUM 20 MG/1
TABLET, FILM COATED ORAL
COMMUNITY
Start: 2019-12-03 | End: 2020-08-07 | Stop reason: SDUPTHER

## 2020-02-26 NOTE — PROGRESS NOTES
"Subjective  Senjanae Capellan is a 64 y.o. male    HPI coming in for follow-up patient with diabetes and hypertension, dyslipidemia recent accidental fall about 2 weeks ago with pain and swelling in his left knee this appears to be improving.  Has had recent weight gain.  No alcohol or tobacco use    The following portions of the patient's history were reviewed and updated as appropriate: allergies, current medications, past family history, past medical history, past social history, past surgical history, and problem list.     Review of Systems   Constitutional: Negative.  Negative for activity change, appetite change, fatigue and fever.   HENT: Negative for congestion, ear discharge, ear pain and trouble swallowing.    Eyes: Negative for photophobia and visual disturbance.   Respiratory: Negative for cough and shortness of breath.    Cardiovascular: Negative for chest pain and palpitations.   Gastrointestinal: Negative for abdominal distention, abdominal pain, constipation, diarrhea, nausea and vomiting.   Endocrine: Negative.    Genitourinary: Negative for dysuria, hematuria and urgency.   Musculoskeletal: Positive for arthralgias. Negative for back pain, joint swelling and myalgias.        Lt knee pain   Skin: Negative for color change and rash.   Allergic/Immunologic: Negative.    Neurological: Negative for dizziness, weakness, light-headedness and headaches.   Hematological: Negative for adenopathy. Does not bruise/bleed easily.   Psychiatric/Behavioral: Positive for sleep disturbance. Negative for agitation, confusion and dysphoric mood. The patient is not nervous/anxious.        Visit Vitals  /84   Pulse 78   Temp 97.6 °F (36.4 °C) (Temporal)   Ht 177.8 cm (70\")   Wt 104 kg (228 lb 12.8 oz)   SpO2 96%   BMI 32.83 kg/m²       Objective  Physical Exam   Constitutional: He is oriented to person, place, and time. He appears well-developed and well-nourished. No distress.   HENT:   Nose: Nose normal. "   Mouth/Throat: Oropharynx is clear and moist.   Eyes: Conjunctivae and EOM are normal. No scleral icterus.   Neck: No tracheal deviation present. No thyromegaly present.   Cardiovascular: Normal rate and regular rhythm. Exam reveals no friction rub.   No murmur heard.  Pulmonary/Chest: No respiratory distress. He has no wheezes. He has no rales.   Abdominal: Soft. He exhibits no distension and no mass. There is no tenderness. There is no guarding.   Musculoskeletal: Normal range of motion. He exhibits tenderness and deformity.    Senate had a diabetic foot exam performed today.  Vascular Status -  His right foot exhibits normal foot vasculature  and no edema. His left foot exhibits normal foot vasculature  and no edema.  Skin Integrity  -  His right foot skin is intact.His left foot skin is intact..  Lymphadenopathy:     He has no cervical adenopathy.   Neurological: He is alert and oriented to person, place, and time. He has normal reflexes. No cranial nerve deficit. Coordination normal.   Skin: Skin is warm and dry. No rash noted. No erythema.   Psychiatric: He has a normal mood and affect. His behavior is normal. Judgment and thought content normal.       Diagnoses and all orders for this visit:    Need for vaccination  -     Pneumococcal Polysaccharide Vaccine 23-Valent Greater Than or Equal To 1yo Subcutaneous / IM    Type 2 diabetes mellitus with hyperglycemia, without long-term current use of insulin (CMS/[facility]) discussed dietary restrictions counseled about diet and weight loss.  Check A1c and urine for microalbumin    Atrial fibrillation, chronic rate control okay on anticoagulant therapy    Essential hypertension stable with current meds and low-salt diet    Other orders  -     atorvastatin (LIPITOR) 20 MG tablet  -     Omega-3 Fatty Acids (FISH OIL) 1200 MG capsule delayed-release; Take  by mouth.

## 2020-02-27 LAB
ALBUMIN/CREAT UR: <5 MG/G CREAT (ref 0–29)
CREAT UR-MCNC: 56.1 MG/DL
HBA1C MFR BLD: 7 % (ref 4.8–5.6)
HCV AB S/CO SERPL IA: <0.1 S/CO RATIO (ref 0–0.9)
LDLC SERPL DIRECT ASSAY-MCNC: 65 MG/DL (ref 0–99)
MICROALBUMIN UR-MCNC: <3 UG/ML

## 2020-05-26 ENCOUNTER — TELEPHONE (OUTPATIENT)
Dept: INTERNAL MEDICINE | Facility: CLINIC | Age: 65
End: 2020-05-26

## 2020-05-26 NOTE — TELEPHONE ENCOUNTER
PT'S WIFE CALLED STATING THAT THE PT WENT TO THE  URGENT CARE 5/25/20. PT WAS THERE BECAUSE HE WAS EXPERIENCING DIZZINESS, HIGH BP AND HIGH SUGAR.  PT WAS TOLD TO BE SEEN BY HIS PCP TODAY.    CANDICE'S CONTACT [phone]     PLEASE ADVISE

## 2020-05-28 ENCOUNTER — OFFICE VISIT (OUTPATIENT)
Dept: INTERNAL MEDICINE | Facility: CLINIC | Age: 65
End: 2020-05-28

## 2020-05-28 VITALS
OXYGEN SATURATION: 98 % | BODY MASS INDEX: 32.21 KG/M2 | SYSTOLIC BLOOD PRESSURE: 135 MMHG | HEART RATE: 68 BPM | HEIGHT: 70 IN | DIASTOLIC BLOOD PRESSURE: 87 MMHG | WEIGHT: 225 LBS | TEMPERATURE: 97.1 F

## 2020-05-28 DIAGNOSIS — I10 ESSENTIAL HYPERTENSION: Primary | ICD-10-CM

## 2020-05-28 DIAGNOSIS — E11.65 TYPE 2 DIABETES MELLITUS WITH HYPERGLYCEMIA, WITHOUT LONG-TERM CURRENT USE OF INSULIN (HCC): ICD-10-CM

## 2020-05-28 DIAGNOSIS — R42 VERTIGO: ICD-10-CM

## 2020-05-28 PROCEDURE — 99214 OFFICE O/P EST MOD 30 MIN: CPT | Performed by: INTERNAL MEDICINE

## 2020-05-28 NOTE — PROGRESS NOTES
Answers for HPI/ROS submitted by the patient on 5/27/2020   What is the primary reason for your visit?: Other  Please describe your symptoms.: Room spinning ,fell as result  Have you had these symptoms before?: No  How long have you been having these symptoms?: 1-4 days  Please list any medications you are currently taking for this condition.: Meclizine  Please describe any probable cause for these symptoms. : Instant care suggested vertigo  Subjective  Yehdua Capellan is a 65 y.o. male    HPI recent episode of dizziness with a sensation of room spinning around him by the time he reached the urgent care clinic his symptoms had resolved.  He has atrial fibrillation and is compliant with his anticoagulant therapy.  Recent weight gain noted he has type 2 diabetes with an A1c done in December showing reasonably good control he denies polyuria polydipsia.  No head injury currently asymptomatic    The following portions of the patient's history were reviewed and updated as appropriate: allergies, current medications, past family history, past medical history, past social history, past surgical history, and problem list.     Review of Systems   Constitutional: Negative.  Negative for activity change, appetite change, fatigue and fever.   HENT: Negative for congestion, ear discharge, ear pain and trouble swallowing.    Eyes: Negative for photophobia and visual disturbance.   Respiratory: Negative for cough and shortness of breath.    Cardiovascular: Negative for chest pain and palpitations.   Gastrointestinal: Negative for abdominal distention, abdominal pain, constipation, diarrhea, nausea and vomiting.   Endocrine: Negative.    Genitourinary: Negative for dysuria, hematuria and urgency.   Musculoskeletal: Positive for arthralgias. Negative for back pain, joint swelling and myalgias.   Skin: Negative for color change and rash.   Allergic/Immunologic: Negative.    Neurological: Negative for dizziness, weakness,  "light-headedness and headaches.   Hematological: Negative for adenopathy. Does not bruise/bleed easily.   Psychiatric/Behavioral: Positive for sleep disturbance. Negative for agitation, confusion and dysphoric mood. The patient is not nervous/anxious.        Visit Vitals  /87   Pulse 68   Temp 97.1 °F (36.2 °C) (Temporal)   Ht 177.8 cm (70\")   Wt 102 kg (225 lb)   SpO2 98%   BMI 32.28 kg/m²       Objective  Physical Exam   Constitutional: He is oriented to person, place, and time. He appears well-developed and well-nourished. No distress.   HENT:   Nose: Nose normal.   Mouth/Throat: Oropharynx is clear and moist.   Eyes: Conjunctivae and EOM are normal. No scleral icterus.   Neck: No tracheal deviation present. No thyromegaly present.   Cardiovascular: Normal rate. Exam reveals no friction rub.   No murmur heard.  Pulmonary/Chest: No respiratory distress. He has no wheezes. He has no rales.   Abdominal: Soft. He exhibits no distension and no mass. There is no tenderness. There is no guarding.   Musculoskeletal: Normal range of motion. He exhibits deformity.   Lymphadenopathy:     He has no cervical adenopathy.   Neurological: He is alert and oriented to person, place, and time. He has normal reflexes. No cranial nerve deficit. Coordination normal.   Skin: Skin is warm and dry. No rash noted. No erythema.   Psychiatric: He has a normal mood and affect. His behavior is normal. Judgment and thought content normal.       Diagnoses and all orders for this visit:    Essential hypertension stable with current meds and low-salt diet    Vertigo currently asymptomatic suspect benign positional vertigo.  Meclizine as needed    Type 2 diabetes mellitus with hyperglycemia, without long-term current use of insulin (CMS/[facility]) continue with the dietary restrictions check A1c with next visit        "

## 2020-06-25 ENCOUNTER — OFFICE VISIT (OUTPATIENT)
Dept: INTERNAL MEDICINE | Facility: CLINIC | Age: 65
End: 2020-06-25

## 2020-06-25 VITALS
SYSTOLIC BLOOD PRESSURE: 120 MMHG | HEART RATE: 58 BPM | DIASTOLIC BLOOD PRESSURE: 70 MMHG | BODY MASS INDEX: 32.18 KG/M2 | OXYGEN SATURATION: 98 % | TEMPERATURE: 98 F | HEIGHT: 70 IN | WEIGHT: 224.8 LBS

## 2020-06-25 DIAGNOSIS — E11.65 TYPE 2 DIABETES MELLITUS WITH HYPERGLYCEMIA, WITHOUT LONG-TERM CURRENT USE OF INSULIN (HCC): ICD-10-CM

## 2020-06-25 DIAGNOSIS — I48.20 ATRIAL FIBRILLATION, CHRONIC (HCC): Primary | ICD-10-CM

## 2020-06-25 DIAGNOSIS — M79.641 PAIN IN BOTH HANDS: ICD-10-CM

## 2020-06-25 DIAGNOSIS — M79.642 PAIN IN BOTH HANDS: ICD-10-CM

## 2020-06-25 PROCEDURE — 99397 PER PM REEVAL EST PAT 65+ YR: CPT | Performed by: INTERNAL MEDICINE

## 2020-06-25 PROCEDURE — G0439 PPPS, SUBSEQ VISIT: HCPCS | Performed by: INTERNAL MEDICINE

## 2020-06-25 PROCEDURE — 96160 PT-FOCUSED HLTH RISK ASSMT: CPT | Performed by: INTERNAL MEDICINE

## 2020-06-25 NOTE — PROGRESS NOTES
The ABCs of the Annual Wellness Visit  Subsequent Medicare Wellness Visit    Chief Complaint   Patient presents with   • Medicare Wellness-subsequent     arthritis in hands        Subjective   History of Present Illness:  Yehuda Capellan is a 65 y.o. male who presents for a Subsequent Medicare Wellness Visit.    HEALTH RISK ASSESSMENT    Recent Hospitalizations:  No hospitalization(s) within the last year.    Current Medical Providers:  Patient Care Team:  Jeff Sanchez MD as PCP - General    Smoking Status:  Social History     Tobacco Use   Smoking Status Former Smoker   Smokeless Tobacco Never Used       Alcohol Consumption:  Social History     Substance and Sexual Activity   Alcohol Use No       Depression Screen:   PHQ-2/PHQ-9 Depression Screening 6/25/2020   Little interest or pleasure in doing things 0   Feeling down, depressed, or hopeless 0   Total Score 0       Fall Risk Screen:  STEADI Fall Risk Assessment was completed, and patient is at MODERATE risk for falls. Assessment completed on:2/26/2020    Health Habits and Functional and Cognitive Screening:  Functional & Cognitive Status 6/25/2020   Do you have difficulty preparing food and eating? No   Do you have difficulty bathing yourself, getting dressed or grooming yourself? No   Do you have difficulty using the toilet? No   Do you have difficulty moving around from place to place? No   Do you have trouble with steps or getting out of a bed or a chair? No   Current Diet Well Balanced Diet   Dental Exam Up to date   Eye Exam Up to date   Exercise (times per week) 0 times per week   Current Exercise Activities Include No Regular Exercise   Do you need help using the phone?  No   Are you deaf or do you have serious difficulty hearing?  Yes   Do you need help with transportation? No   Do you need help shopping? No   Do you need help preparing meals?  No   Do you need help with housework?  No   Do you need help with laundry? No   Do you need help taking  your medications? No   Do you need help managing money? No   Do you ever drive or ride in a car without wearing a seat belt? No   Have you felt unusual stress, anger or loneliness in the last month? No   Who do you live with? Spouse   If you need help, do you have trouble finding someone available to you? No   Have you been bothered in the last four weeks by sexual problems? No   Do you have difficulty concentrating, remembering or making decisions? Yes         Does the patient have evidence of cognitive impairment? No    Asprin use counseling:Taking ASA appropriately as indicated    Age-appropriate Screening Schedule:  Refer to the list below for future screening recommendations based on patient's age, sex and/or medical conditions. Orders for these recommended tests are listed in the plan section. The patient has been provided with a written plan.    Health Maintenance   Topic Date Due   • DIABETIC EYE EXAM  10/04/2016   • TDAP/TD VACCINES (1 - Tdap) 02/26/2021 (Originally 5/11/1966)   • ZOSTER VACCINE (2 of 3) 08/16/2029 (Originally 10/24/2014)   • INFLUENZA VACCINE  08/01/2020   • HEMOGLOBIN A1C  08/26/2020   • DIABETIC FOOT EXAM  02/26/2021   • LIPID PANEL  02/26/2021   • URINE MICROALBUMIN  02/26/2021   • COLONOSCOPY  01/07/2023          The following portions of the patient's history were reviewed and updated as appropriate: allergies, current medications, past family history, past medical history, past social history, past surgical history and problem list.    Outpatient Medications Prior to Visit   Medication Sig Dispense Refill   • aspirin 81 MG tablet Take  by mouth Daily.     • atorvastatin (LIPITOR) 20 MG tablet      • cetirizine (zyrTEC) 10 MG tablet      • ELIQUIS 5 MG tablet tablet      • glimepiride (AMARYL) 1 MG tablet Take 0.5 tablets by mouth Every Morning Before Breakfast. 90 tablet 3   • glucose blood test strip Use as instructed 100 each 3   • glucose monitor monitoring kit ACCU-CHECK ABISAI  PLUS METER as requested. 1 each 1   • Lancets Misc. misc TEST TWICE A DAY FOR DM. E11.9 200 each 3   • losartan-hydrochlorothiazide (HYZAAR) 100-12.5 MG per tablet Take  by mouth.     • meclizine (ANTIVERT) 25 MG tablet Take 1 tablet by mouth Every 6 (Six) Hours As Needed for Dizziness. 15 tablet 0   • metoprolol tartrate (LOPRESSOR) 50 MG tablet Take  by mouth.     • Omega-3 Fatty Acids (FISH OIL) 1200 MG capsule delayed-release Take  by mouth.     • traZODone (DESYREL) 50 MG tablet Take 1 tablet by mouth Every Night. 90 tablet 3   • Melatonin 5 MG/15ML liquid        No facility-administered medications prior to visit.        Patient Active Problem List   Diagnosis   • Atrial fibrillation, chronic (CMS/HCC)   • Dyslipidemia   • Essential hypertension   • Joint pain, hip   • Type 2 diabetes mellitus with hyperglycemia, without long-term current use of insulin (CMS/[facility])       Advanced Care Planning:  ACP discussion was held with the patient during this visit. Patient has an advance directive in EMR which is still valid.     Review of Systems   Constitutional: Positive for fatigue. Negative for activity change, appetite change and fever.   HENT: Negative for congestion, ear discharge, ear pain and trouble swallowing.    Eyes: Negative for photophobia and visual disturbance.   Respiratory: Negative for cough and shortness of breath.    Cardiovascular: Negative for chest pain and palpitations.   Gastrointestinal: Negative for abdominal distention, abdominal pain, constipation, diarrhea, nausea and vomiting.   Endocrine: Negative.    Genitourinary: Negative for dysuria, hematuria and urgency.   Musculoskeletal: Positive for arthralgias and joint swelling. Negative for back pain and myalgias.   Skin: Negative for color change and rash.   Allergic/Immunologic: Negative.    Neurological: Negative for dizziness, weakness, light-headedness and headaches.   Hematological: Negative for adenopathy. Does not bruise/bleed easily.  "  Psychiatric/Behavioral: Positive for sleep disturbance. Negative for agitation, confusion and dysphoric mood. The patient is not nervous/anxious.        Compared to one year ago, the patient feels his physical health is the same.  Compared to one year ago, the patient feels his mental health is the same.    Reviewed chart for potential of high risk medication in the elderly: yes  Reviewed chart for potential of harmful drug interactions in the elderly:yes    Objective         Vitals:    06/25/20 1309   BP: 120/70   Pulse: 58   Temp: 98 °F (36.7 °C)   TempSrc: Temporal   SpO2: 98%   Weight: 102 kg (224 lb 12.8 oz)   Height: 177.8 cm (70\")   PainSc:   6   PainLoc: Hand       Body mass index is 32.26 kg/m².  Discussed the patient's BMI with him. The BMI is above average; BMI management plan is completed.    Physical Exam   Constitutional: He is oriented to person, place, and time. He appears well-developed and well-nourished. No distress.   HENT:   Nose: Nose normal.   Mouth/Throat: Oropharynx is clear and moist.   Eyes: Conjunctivae and EOM are normal. No scleral icterus.   Neck: No tracheal deviation present. No thyromegaly present.   Cardiovascular: Normal rate and regular rhythm. Exam reveals no friction rub.   No murmur heard.  Pulmonary/Chest: No respiratory distress. He has no wheezes. He has no rales.   Abdominal: Soft. He exhibits no distension and no mass. There is no tenderness. There is no guarding.   Musculoskeletal: Normal range of motion. He exhibits edema, tenderness and deformity.   Lymphadenopathy:     He has no cervical adenopathy.   Neurological: He is alert and oriented to person, place, and time. He has normal reflexes. No cranial nerve deficit. Coordination normal.   Skin: Skin is warm and dry. No rash noted. No erythema.   Psychiatric: He has a normal mood and affect. His behavior is normal. Judgment and thought content normal.             Assessment/Plan   Medicare Risks and Personalized " Health Plan  CMS Preventative Services Quick Reference  Advance Directive Discussion  Chronic Pain   Obesity/Overweight   Polypharmacy    The above risks/problems have been discussed with the patient.  Pertinent information has been shared with the patient in the After Visit Summary.  Follow up plans and orders are seen below in the Assessment/Plan Section.    Diagnoses and all orders for this visit:    1. Atrial fibrillation, chronic (CMS/[facility]) (Primary) rate control okay on anticoagulant therapy    2. Type 2 diabetes mellitus with hyperglycemia, without long-term current use of insulin (CMS/[facility]) continue with the dietary restrictions, weight loss    3. Pain in both hands has had recent exacerbations advise NSAIDs PRN only referral to orthopedics       Follow Up:  No follow-ups on file.     An After Visit Summary and PPPS were given to the patient.

## 2020-07-02 ENCOUNTER — LAB (OUTPATIENT)
Dept: LAB | Facility: HOSPITAL | Age: 65
End: 2020-07-02

## 2020-07-02 ENCOUNTER — OFFICE VISIT (OUTPATIENT)
Dept: ORTHOPEDIC SURGERY | Facility: CLINIC | Age: 65
End: 2020-07-02

## 2020-07-02 VITALS — RESPIRATION RATE: 18 BRPM | HEIGHT: 70 IN | WEIGHT: 224 LBS | BODY MASS INDEX: 32.07 KG/M2

## 2020-07-02 DIAGNOSIS — M25.542 ARTHRALGIA OF HANDS, BILATERAL: Primary | ICD-10-CM

## 2020-07-02 DIAGNOSIS — M19.049 ARTHRITIS OF HAND: ICD-10-CM

## 2020-07-02 DIAGNOSIS — M25.542 ARTHRALGIA OF HANDS, BILATERAL: ICD-10-CM

## 2020-07-02 DIAGNOSIS — M25.541 ARTHRALGIA OF HANDS, BILATERAL: Primary | ICD-10-CM

## 2020-07-02 DIAGNOSIS — M25.541 ARTHRALGIA OF HANDS, BILATERAL: ICD-10-CM

## 2020-07-02 LAB
BASOPHILS # BLD AUTO: 0.09 10*3/MM3 (ref 0–0.2)
BASOPHILS NFR BLD AUTO: 1.2 % (ref 0–1.5)
CRP SERPL-MCNC: 0.06 MG/DL (ref 0–0.5)
DEPRECATED RDW RBC AUTO: 38.7 FL (ref 37–54)
EOSINOPHIL # BLD AUTO: 0.27 10*3/MM3 (ref 0–0.4)
EOSINOPHIL NFR BLD AUTO: 3.6 % (ref 0.3–6.2)
ERYTHROCYTE [DISTWIDTH] IN BLOOD BY AUTOMATED COUNT: 12.4 % (ref 12.3–15.4)
ERYTHROCYTE [SEDIMENTATION RATE] IN BLOOD: 1 MM/HR (ref 0–20)
HCT VFR BLD AUTO: 46.1 % (ref 37.5–51)
HGB BLD-MCNC: 16.1 G/DL (ref 13–17.7)
IMM GRANULOCYTES # BLD AUTO: 0.02 10*3/MM3 (ref 0–0.05)
IMM GRANULOCYTES NFR BLD AUTO: 0.3 % (ref 0–0.5)
LYMPHOCYTES # BLD AUTO: 2.06 10*3/MM3 (ref 0.7–3.1)
LYMPHOCYTES NFR BLD AUTO: 27.3 % (ref 19.6–45.3)
MCH RBC QN AUTO: 30 PG (ref 26.6–33)
MCHC RBC AUTO-ENTMCNC: 34.9 G/DL (ref 31.5–35.7)
MCV RBC AUTO: 86 FL (ref 79–97)
MONOCYTES # BLD AUTO: 0.7 10*3/MM3 (ref 0.1–0.9)
MONOCYTES NFR BLD AUTO: 9.3 % (ref 5–12)
NEUTROPHILS NFR BLD AUTO: 4.41 10*3/MM3 (ref 1.7–7)
NEUTROPHILS NFR BLD AUTO: 58.3 % (ref 42.7–76)
NRBC BLD AUTO-RTO: 0 /100 WBC (ref 0–0.2)
PLATELET # BLD AUTO: 247 10*3/MM3 (ref 140–450)
PMV BLD AUTO: 11.4 FL (ref 6–12)
RBC # BLD AUTO: 5.36 10*6/MM3 (ref 4.14–5.8)
URATE SERPL-MCNC: 4.6 MG/DL (ref 3.4–7)
WBC # BLD AUTO: 7.55 10*3/MM3 (ref 3.4–10.8)

## 2020-07-02 PROCEDURE — 85025 COMPLETE CBC W/AUTO DIFF WBC: CPT | Performed by: ORTHOPAEDIC SURGERY

## 2020-07-02 PROCEDURE — 86140 C-REACTIVE PROTEIN: CPT | Performed by: ORTHOPAEDIC SURGERY

## 2020-07-02 PROCEDURE — 86038 ANTINUCLEAR ANTIBODIES: CPT | Performed by: ORTHOPAEDIC SURGERY

## 2020-07-02 PROCEDURE — 85652 RBC SED RATE AUTOMATED: CPT

## 2020-07-02 PROCEDURE — 86431 RHEUMATOID FACTOR QUANT: CPT | Performed by: ORTHOPAEDIC SURGERY

## 2020-07-02 PROCEDURE — 99203 OFFICE O/P NEW LOW 30 MIN: CPT | Performed by: ORTHOPAEDIC SURGERY

## 2020-07-02 PROCEDURE — 84550 ASSAY OF BLOOD/URIC ACID: CPT | Performed by: ORTHOPAEDIC SURGERY

## 2020-07-02 PROCEDURE — 36415 COLL VENOUS BLD VENIPUNCTURE: CPT | Performed by: ORTHOPAEDIC SURGERY

## 2020-07-02 NOTE — PROGRESS NOTES
Subjective   Patient ID: Yehuda Capellan is a 65 y.o. male  Pain of the Left Hand (Patient is here today for bilateral hand pain, he denies any injury or trauma. 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  Was seen by Dr. Sanchez sent here for evaluation as he has very poor movement in his left hand especially.  Denies paresthesias does have stiffness chronically in both knees right hip which she attributes to a childhood fracture, has tried a single dose of Aleve which significantly helped his pain but he is concerned about taking it giving his history of anticoagulation for atrial fibrillation.  He currently takes Tylenol for pain relief with minimal improvement, denies recent injections or surgery in either hand, has several healing sores on both hands secondary to his repetitive fence work and woodworking activities.  He does have a maternal history of rheumatoid arthritis and is concerned he may be developing that himself.      Review of Systems   Constitutional: Negative for fever.   HENT: Negative for dental problem and voice change.    Eyes: Negative for visual disturbance.   Respiratory: Negative for shortness of breath.    Cardiovascular: Negative for chest pain.   Gastrointestinal: Negative for abdominal pain.   Genitourinary: Negative for dysuria.   Musculoskeletal: Positive for arthralgias. Negative for gait problem and joint swelling.   Skin: Negative for rash.   Neurological: Negative for speech difficulty.   Hematological: Does not bruise/bleed easily.   Psychiatric/Behavioral: Negative for confusion.       Past Medical  History:   Diagnosis Date   • Atrial fibrillation (CMS/HCC)    • Elevated glucose 09/29/2017    A1C 6.7   • Hyperlipidemia         Past Surgical History:   Procedure Laterality Date   • HAND SURGERY      LEFT PINKY SEWN BACK ON        History reviewed. No pertinent family history.    Social History     Socioeconomic History   • Marital status:      Spouse name: Not on file   • Number of children: Not on file   • Years of education: Not on file   • Highest education level: Not on file   Tobacco Use   • Smoking status: Former Smoker   • Smokeless tobacco: Never Used   Substance and Sexual Activity   • Alcohol use: No   • Drug use: No   • Sexual activity: Defer       I have reviewed the medical and surgical history, family history, social history, medications, and/or allergies, and the review of systems of this report.    Allergies   Allergen Reactions   • Sulfa Antibiotics Unknown - Low Severity     Patient states he had allergic reaction to sulfa drugs when he was a child.         Current Outpatient Medications:   •  aspirin 81 MG tablet, Take  by mouth Daily., Disp: , Rfl:   •  atorvastatin (LIPITOR) 20 MG tablet, , Disp: , Rfl:   •  cetirizine (zyrTEC) 10 MG tablet, , Disp: , Rfl:   •  ELIQUIS 5 MG tablet tablet, , Disp: , Rfl:   •  glimepiride (AMARYL) 1 MG tablet, Take 0.5 tablets by mouth Every Morning Before Breakfast., Disp: 90 tablet, Rfl: 3  •  glucose blood test strip, Use as instructed, Disp: 100 each, Rfl: 3  •  glucose monitor monitoring kit, ACCU-CHECK ABISAI PLUS METER as requested., Disp: 1 each, Rfl: 1  •  Lancets Misc. misc, TEST TWICE A DAY FOR DM. E11.9, Disp: 200 each, Rfl: 3  •  losartan-hydrochlorothiazide (HYZAAR) 100-12.5 MG per tablet, Take  by mouth., Disp: , Rfl:   •  meclizine (ANTIVERT) 25 MG tablet, Take 1 tablet by mouth Every 6 (Six) Hours As Needed for Dizziness., Disp: 15 tablet, Rfl: 0  •  metoprolol tartrate (LOPRESSOR) 50 MG tablet, Take  by mouth., Disp: , Rfl:   •   "Omega-3 Fatty Acids (FISH OIL) 1200 MG capsule delayed-release, Take  by mouth., Disp: , Rfl:   •  traZODone (DESYREL) 50 MG tablet, Take 1 tablet by mouth Every Night., Disp: 90 tablet, Rfl: 3    Objective   Resp 18   Ht 177.8 cm (70\")   Wt 102 kg (224 lb)   BMI 32.14 kg/m²    Physical Exam  Constitutional: Patient is oriented to person, place, and time. Patient appears well-developed and well-nourished.   HENT:Head: Normocephalic and atraumatic.   Eyes: EOM are normal. Pupils are equal, round, and reactive to light.   Neck: Normal range of motion. Neck supple.   Cardiovascular: Normal rate.    Pulmonary/Chest: Effort normal and breath sounds normal.   Abdominal: Soft.   Neurological: Patient is alert and oriented to person, place, and time.   Skin: Skin is warm and dry.   Psychiatric: Patient has a normal mood and affect.   Nursing note and vitals reviewed.       [unfilled]   Left hand: Some tenderness diffusely across the MP and PIP joints, significant loss of flexion throughout the MP joints of the index through small fingers but no triggering of the flexor tendon sheaths no tenderness in that area, osteophyte formation is obvious around the PIP and DIP joints diffusely but no sign of significant periarticular skin breakdown.    Right hand: Some tenderness diffusely across the MP and PIP joints, significant loss of flexion throughout the MP joints of the index through small fingers but no triggering of the flexor tendon sheaths no tenderness in that area, osteophyte formation is obvious around the PIP and DIP joints diffusely but no sign of significant periarticular skin breakdown.    Assessment/Plan   Review of Radiographic Studies:    Radiographic images today of affected area I personally viewed and showed no sign of acute fracture or dislocation.      Procedures     Senate was seen today for pain and pain.    Diagnoses and all orders for this visit:    Arthralgia of hands, bilateral  -     XR Hand 3+ " View Bilateral       Orthopedic activities reviewed and patient expressed appreciation and Using illustrations and models, the nature of the pathology was explained to the patient      Recommendations/Plan:   Referral: Rheumatologist    Patient agreeable to call or return sooner for any concerns.             Impression:  Diffuse inflammatory arthropathy throughout both hands MP and PIP joints     plan:  Refer to rheumatology for further evaluation and treatment, ordered serologies, advised to take Tylenol

## 2020-07-03 LAB — CHROMATIN AB SERPL-ACNC: <10 IU/ML (ref 0–14)

## 2020-07-04 LAB — ANA SER QL: NEGATIVE

## 2020-07-08 LAB — HLA-B27 QL NAA+PROBE: NEGATIVE

## 2020-08-07 RX ORDER — APIXABAN 5 MG/1
5 TABLET, FILM COATED ORAL DAILY
Qty: 90 TABLET | Refills: 3 | Status: SHIPPED | OUTPATIENT
Start: 2020-08-07 | End: 2020-08-27 | Stop reason: SDUPTHER

## 2020-08-07 RX ORDER — METOPROLOL TARTRATE 50 MG/1
50 TABLET, FILM COATED ORAL 2 TIMES DAILY
Qty: 180 TABLET | Refills: 3 | Status: SHIPPED | OUTPATIENT
Start: 2020-08-07 | End: 2020-08-27 | Stop reason: SDUPTHER

## 2020-08-07 RX ORDER — TRAZODONE HYDROCHLORIDE 50 MG/1
50 TABLET ORAL NIGHTLY
Qty: 90 TABLET | Refills: 3 | Status: SHIPPED | OUTPATIENT
Start: 2020-08-07 | End: 2020-08-27 | Stop reason: SDUPTHER

## 2020-08-07 RX ORDER — LOSARTAN POTASSIUM AND HYDROCHLOROTHIAZIDE 12.5; 1 MG/1; MG/1
1 TABLET ORAL DAILY
Qty: 90 TABLET | Refills: 3 | Status: SHIPPED | OUTPATIENT
Start: 2020-08-07 | End: 2020-08-27 | Stop reason: SDUPTHER

## 2020-08-07 RX ORDER — GLIMEPIRIDE 1 MG/1
0.5 TABLET ORAL
Qty: 90 TABLET | Refills: 3 | Status: SHIPPED | OUTPATIENT
Start: 2020-08-07 | End: 2020-08-27 | Stop reason: SDUPTHER

## 2020-08-07 RX ORDER — ATORVASTATIN CALCIUM 20 MG/1
20 TABLET, FILM COATED ORAL NIGHTLY
Qty: 90 TABLET | Refills: 3 | Status: SHIPPED | OUTPATIENT
Start: 2020-08-07 | End: 2020-08-27 | Stop reason: SDUPTHER

## 2020-08-17 ENCOUNTER — TRANSCRIBE ORDERS (OUTPATIENT)
Dept: LAB | Facility: HOSPITAL | Age: 65
End: 2020-08-17

## 2020-08-17 ENCOUNTER — LAB (OUTPATIENT)
Dept: LAB | Facility: HOSPITAL | Age: 65
End: 2020-08-17

## 2020-08-17 DIAGNOSIS — D89.89 RADIATION CHIMERA (HCC): ICD-10-CM

## 2020-08-17 DIAGNOSIS — M15.0 PRIMARY GENERALIZED HYPERTROPHIC OSTEOARTHROSIS: ICD-10-CM

## 2020-08-17 DIAGNOSIS — L40.9 PSORIASIS: ICD-10-CM

## 2020-08-17 DIAGNOSIS — M13.0 POLYARTHROPATHY: Primary | ICD-10-CM

## 2020-08-17 DIAGNOSIS — M13.0 POLYARTHROPATHY: ICD-10-CM

## 2020-08-17 LAB
CK SERPL-CCNC: 49 U/L (ref 20–200)
CRP SERPL-MCNC: 0.13 MG/DL (ref 0–0.5)
URATE SERPL-MCNC: 4.4 MG/DL (ref 3.4–7)

## 2020-08-17 PROCEDURE — 86140 C-REACTIVE PROTEIN: CPT

## 2020-08-17 PROCEDURE — 82550 ASSAY OF CK (CPK): CPT

## 2020-08-17 PROCEDURE — 84550 ASSAY OF BLOOD/URIC ACID: CPT

## 2020-08-17 PROCEDURE — 85652 RBC SED RATE AUTOMATED: CPT

## 2020-08-17 PROCEDURE — 36415 COLL VENOUS BLD VENIPUNCTURE: CPT

## 2020-08-18 LAB — ERYTHROCYTE [SEDIMENTATION RATE] IN BLOOD: 2 MM/HR (ref 0–20)

## 2020-08-27 ENCOUNTER — TELEPHONE (OUTPATIENT)
Dept: INTERNAL MEDICINE | Facility: CLINIC | Age: 65
End: 2020-08-27

## 2020-08-27 RX ORDER — ATORVASTATIN CALCIUM 20 MG/1
20 TABLET, FILM COATED ORAL NIGHTLY
Qty: 30 TABLET | Refills: 0 | Status: SHIPPED | OUTPATIENT
Start: 2020-08-27 | End: 2021-02-15 | Stop reason: SDUPTHER

## 2020-08-27 RX ORDER — TRAZODONE HYDROCHLORIDE 50 MG/1
50 TABLET ORAL NIGHTLY
Qty: 30 TABLET | Refills: 0 | Status: SHIPPED | OUTPATIENT
Start: 2020-08-27 | End: 2021-04-01

## 2020-08-27 RX ORDER — GLIMEPIRIDE 1 MG/1
0.5 TABLET ORAL
Qty: 30 TABLET | Refills: 0 | Status: SHIPPED | OUTPATIENT
Start: 2020-08-27 | End: 2020-10-21 | Stop reason: SDUPTHER

## 2020-08-27 RX ORDER — METOPROLOL TARTRATE 50 MG/1
50 TABLET, FILM COATED ORAL 2 TIMES DAILY
Qty: 30 TABLET | Refills: 0 | Status: SHIPPED | OUTPATIENT
Start: 2020-08-27 | End: 2021-06-17 | Stop reason: SDUPTHER

## 2020-08-27 RX ORDER — APIXABAN 5 MG/1
5 TABLET, FILM COATED ORAL DAILY
Qty: 30 TABLET | Refills: 0 | Status: SHIPPED | OUTPATIENT
Start: 2020-08-27 | End: 2020-09-28 | Stop reason: SDUPTHER

## 2020-08-27 RX ORDER — LOSARTAN POTASSIUM AND HYDROCHLOROTHIAZIDE 12.5; 1 MG/1; MG/1
1 TABLET ORAL DAILY
Qty: 30 TABLET | Refills: 0 | Status: SHIPPED | OUTPATIENT
Start: 2020-08-27 | End: 2021-07-09 | Stop reason: SDUPTHER

## 2020-08-27 NOTE — TELEPHONE ENCOUNTER
I spoke with Armando at [facility] - they are waiting for payment before sending out the scripts.     I gave Santy the number [phone]. He is going to call them. All meds have been sent to [facility] for a 30 day supply until the mail order scripts get sent out

## 2020-08-27 NOTE — TELEPHONE ENCOUNTER
Spoke with patient, he is not happy with the mail order pharmacy and request all future scripts be sent to [facility]

## 2020-08-27 NOTE — TELEPHONE ENCOUNTER
PT ASKED IF LENCHO COULD CALL HIM BACK.  PT STATED THAT HE WAS SPEAKING TO HER ABOUT INS THIS MORNING.    PT CONTACT [phone]

## 2020-09-28 ENCOUNTER — OFFICE VISIT (OUTPATIENT)
Dept: INTERNAL MEDICINE | Facility: CLINIC | Age: 65
End: 2020-09-28

## 2020-09-28 VITALS
TEMPERATURE: 96.6 F | HEART RATE: 54 BPM | OXYGEN SATURATION: 98 % | HEIGHT: 70 IN | BODY MASS INDEX: 31.23 KG/M2 | WEIGHT: 218.12 LBS | DIASTOLIC BLOOD PRESSURE: 70 MMHG | SYSTOLIC BLOOD PRESSURE: 120 MMHG

## 2020-09-28 DIAGNOSIS — E11.65 TYPE 2 DIABETES MELLITUS WITH HYPERGLYCEMIA, WITHOUT LONG-TERM CURRENT USE OF INSULIN (HCC): ICD-10-CM

## 2020-09-28 DIAGNOSIS — Z23 NEED FOR VACCINATION: Primary | ICD-10-CM

## 2020-09-28 DIAGNOSIS — I10 ESSENTIAL HYPERTENSION: ICD-10-CM

## 2020-09-28 DIAGNOSIS — I48.20 ATRIAL FIBRILLATION, CHRONIC (HCC): ICD-10-CM

## 2020-09-28 LAB — HBA1C MFR BLD: 10.6 %

## 2020-09-28 PROCEDURE — 83036 HEMOGLOBIN GLYCOSYLATED A1C: CPT | Performed by: INTERNAL MEDICINE

## 2020-09-28 PROCEDURE — 99214 OFFICE O/P EST MOD 30 MIN: CPT | Performed by: INTERNAL MEDICINE

## 2020-09-28 PROCEDURE — 90694 VACC AIIV4 NO PRSRV 0.5ML IM: CPT | Performed by: INTERNAL MEDICINE

## 2020-09-28 PROCEDURE — G0008 ADMIN INFLUENZA VIRUS VAC: HCPCS | Performed by: INTERNAL MEDICINE

## 2020-09-28 RX ORDER — APIXABAN 5 MG/1
5 TABLET, FILM COATED ORAL EVERY 12 HOURS SCHEDULED
Qty: 180 TABLET | Refills: 3 | Status: SHIPPED | OUTPATIENT
Start: 2020-09-28 | End: 2021-01-18 | Stop reason: SDUPTHER

## 2020-09-28 RX ORDER — FOLIC ACID 1 MG/1
1000 TABLET ORAL DAILY
COMMUNITY
Start: 2020-09-15 | End: 2020-11-19

## 2020-09-28 RX ORDER — PREDNISONE 10 MG/1
TABLET ORAL
COMMUNITY
Start: 2020-09-15 | End: 2020-11-19

## 2020-09-28 NOTE — PROGRESS NOTES
"Subjective  Senjanae Capellan is a 65 y.o. male    HPI coming in for follow-up patient with SALUD mejia with hypertension and dyslipidemia has type 2 diabetes recently placed on prednisone for suspected seronegative rheumatoid arthritis.  Non-smoker compliant with his diet and exercise    The following portions of the patient's history were reviewed and updated as appropriate: allergies, current medications, past family history, past medical history, past social history, past surgical history, and problem list.     Review of Systems   Constitutional: Positive for fatigue. Negative for activity change, appetite change and fever.   HENT: Negative for congestion, ear discharge, ear pain and trouble swallowing.    Eyes: Negative for photophobia and visual disturbance.   Respiratory: Negative for cough and shortness of breath.    Cardiovascular: Negative for chest pain and palpitations.   Gastrointestinal: Negative for abdominal distention, abdominal pain, constipation, diarrhea, nausea and vomiting.   Endocrine: Negative.    Genitourinary: Negative for dysuria, hematuria and urgency.   Musculoskeletal: Positive for arthralgias and joint swelling. Negative for back pain and myalgias.   Skin: Negative for color change and rash.   Allergic/Immunologic: Negative.    Neurological: Negative for dizziness, weakness, light-headedness and headaches.   Hematological: Negative for adenopathy. Does not bruise/bleed easily.   Psychiatric/Behavioral: Negative for agitation, confusion and dysphoric mood. The patient is not nervous/anxious.        Visit Vitals  /70   Pulse 54   Temp 96.6 °F (35.9 °C) (Temporal)   Ht 177.8 cm (70\")   Wt 98.9 kg (218 lb 1.9 oz)   SpO2 98%   BMI 31.30 kg/m²       Objective  Physical Exam  Constitutional:       General: He is not in acute distress.     Appearance: He is well-developed.   HENT:      Nose: Nose normal.   Eyes:      General: No scleral icterus.     Conjunctiva/sclera: Conjunctivae normal. "   Neck:      Thyroid: No thyromegaly.      Trachea: No tracheal deviation.   Cardiovascular:      Rate and Rhythm: Normal rate and regular rhythm.      Heart sounds: No murmur. No friction rub.   Pulmonary:      Effort: No respiratory distress.      Breath sounds: No wheezing or rales.   Abdominal:      General: There is no distension.      Palpations: Abdomen is soft. There is no mass.      Tenderness: There is no abdominal tenderness. There is no guarding.   Musculoskeletal: Normal range of motion.         General: Swelling and deformity present.   Lymphadenopathy:      Cervical: No cervical adenopathy.   Skin:     General: Skin is warm and dry.      Findings: No erythema or rash.   Neurological:      Mental Status: He is alert and oriented to person, place, and time.      Cranial Nerves: No cranial nerve deficit.      Coordination: Coordination normal.      Deep Tendon Reflexes: Reflexes are normal and symmetric.   Psychiatric:         Behavior: Behavior normal.         Thought Content: Thought content normal.         Judgment: Judgment normal.         Diagnoses and all orders for this visit:    Need for vaccination  -     Fluad Quad 65+ yrs ([id number])    Type 2 diabetes mellitus with hyperglycemia, without long-term current use of insulin (CMS/[facility]) continue with the dietary restrictions have advised him to discontinue the prednisone because of its hyperglycemic effect his pain control is okay    Atrial fibrillation, chronic (CMS/[facility]) rate control okay on anticoagulant therapy    Essential hypertension stable with current meds and low-salt diet    Other orders  -     folic acid (FOLVITE) 1 MG tablet; Take 1,000 mcg by mouth Daily.  -     methotrexate 2.5 MG tablet; TAKE 4 TABLETS BY MOUTH WEEKLY  -     predniSONE (DELTASONE) 10 MG tablet; TAKE ONE TABLET BY MOUTH DAILY AS NEEDED FOR 2-5 DAYS FOR A FLARE UP, MAY REPEAT ONCE A WEEK  -     Eliquis 5 MG tablet tablet; Take 1 tablet by mouth Every 12 (Twelve)  Hours.

## 2020-10-20 ENCOUNTER — TELEPHONE (OUTPATIENT)
Dept: INTERNAL MEDICINE | Facility: CLINIC | Age: 65
End: 2020-10-20

## 2020-10-20 NOTE — TELEPHONE ENCOUNTER
Patient came in to the office today to discuss glucose numbers staying around 230 - he is feeling weak and tired     He wants to know if there should be a change / increase in medication     Please advise

## 2020-10-21 RX ORDER — GLIMEPIRIDE 1 MG/1
1 TABLET ORAL
Qty: 90 TABLET | Refills: 3 | Status: SHIPPED | OUTPATIENT
Start: 2020-10-21 | End: 2020-11-19

## 2020-11-02 ENCOUNTER — TELEPHONE (OUTPATIENT)
Dept: INTERNAL MEDICINE | Facility: CLINIC | Age: 65
End: 2020-11-02

## 2020-11-02 NOTE — TELEPHONE ENCOUNTER
Patient called and stated that his blood sugar numbers are not going down after being put on medication and would like a call back at [phone].    Please advise

## 2020-11-17 ENCOUNTER — TELEPHONE (OUTPATIENT)
Dept: INTERNAL MEDICINE | Facility: CLINIC | Age: 65
End: 2020-11-17

## 2020-11-17 NOTE — TELEPHONE ENCOUNTER
Called patient we lost connection    Called patient back and left a detailed voice mail asking him how he is feeling.    Offered an appointmet for him to come in and see Dr. Sanchez this week to discuss options     Ok for [facility] to deliver

## 2020-11-17 NOTE — TELEPHONE ENCOUNTER
Patient called concerning his blood glucose levels. He states he called 2 weeks ago but never got a return call. He states his blood sugars are ranging about 279-299 and is concerned its to high. He would like a call back today.

## 2020-11-19 ENCOUNTER — OFFICE VISIT (OUTPATIENT)
Dept: INTERNAL MEDICINE | Facility: CLINIC | Age: 65
End: 2020-11-19

## 2020-11-19 VITALS
OXYGEN SATURATION: 98 % | HEART RATE: 52 BPM | HEIGHT: 70 IN | BODY MASS INDEX: 31.32 KG/M2 | SYSTOLIC BLOOD PRESSURE: 110 MMHG | TEMPERATURE: 97.8 F | WEIGHT: 218.8 LBS | DIASTOLIC BLOOD PRESSURE: 70 MMHG

## 2020-11-19 DIAGNOSIS — E11.65 TYPE 2 DIABETES MELLITUS WITH HYPERGLYCEMIA, WITHOUT LONG-TERM CURRENT USE OF INSULIN (HCC): ICD-10-CM

## 2020-11-19 DIAGNOSIS — I10 ESSENTIAL HYPERTENSION: ICD-10-CM

## 2020-11-19 DIAGNOSIS — I48.20 ATRIAL FIBRILLATION, CHRONIC (HCC): Primary | ICD-10-CM

## 2020-11-19 DIAGNOSIS — E53.8 B12 DEFICIENCY: ICD-10-CM

## 2020-11-19 LAB
ALBUMIN SERPL-MCNC: 4.5 G/DL (ref 3.5–5.2)
ALBUMIN/GLOB SERPL: 1.9 G/DL
ALP SERPL-CCNC: 80 U/L (ref 39–117)
ALT SERPL-CCNC: 28 U/L (ref 1–41)
AST SERPL-CCNC: 17 U/L (ref 1–40)
BILIRUB SERPL-MCNC: 0.9 MG/DL (ref 0–1.2)
BUN SERPL-MCNC: 22 MG/DL (ref 8–23)
BUN/CREAT SERPL: 24.7 (ref 7–25)
CALCIUM SERPL-MCNC: 9.9 MG/DL (ref 8.6–10.5)
CHLORIDE SERPL-SCNC: 99 MMOL/L (ref 98–107)
CO2 SERPL-SCNC: 27.9 MMOL/L (ref 22–29)
CREAT SERPL-MCNC: 0.89 MG/DL (ref 0.76–1.27)
ERYTHROCYTE [DISTWIDTH] IN BLOOD BY AUTOMATED COUNT: 12.2 % (ref 12.3–15.4)
GLOBULIN SER CALC-MCNC: 2.4 GM/DL
GLUCOSE SERPL-MCNC: 268 MG/DL (ref 65–99)
HCT VFR BLD AUTO: 48.9 % (ref 37.5–51)
HGB BLD-MCNC: 16.8 G/DL (ref 13–17.7)
MCH RBC QN AUTO: 30.3 PG (ref 26.6–33)
MCHC RBC AUTO-ENTMCNC: 34.4 G/DL (ref 31.5–35.7)
MCV RBC AUTO: 88.1 FL (ref 79–97)
PLATELET # BLD AUTO: 276 10*3/MM3 (ref 140–450)
POTASSIUM SERPL-SCNC: 5 MMOL/L (ref 3.5–5.2)
PROT SERPL-MCNC: 6.9 G/DL (ref 6–8.5)
RBC # BLD AUTO: 5.55 10*6/MM3 (ref 4.14–5.8)
SODIUM SERPL-SCNC: 138 MMOL/L (ref 136–145)
TSH SERPL DL<=0.005 MIU/L-ACNC: 1.29 UIU/ML (ref 0.27–4.2)
VIT B12 SERPL-MCNC: 662 PG/ML (ref 211–946)
WBC # BLD AUTO: 9.17 10*3/MM3 (ref 3.4–10.8)

## 2020-11-19 PROCEDURE — 99214 OFFICE O/P EST MOD 30 MIN: CPT | Performed by: INTERNAL MEDICINE

## 2020-11-19 RX ORDER — GLIMEPIRIDE 2 MG/1
2 TABLET ORAL
Qty: 90 TABLET | Refills: 3 | Status: SHIPPED | OUTPATIENT
Start: 2020-11-19 | End: 2020-11-25 | Stop reason: SDUPTHER

## 2020-11-19 NOTE — PROGRESS NOTES
"Subjective  Senjanae Capellan is a 65 y.o. male    HPI in for follow-up he is accompanied by his wife was giving us some of the history.  Patient had a history of A. fib, hypertension and dyslipidemia has had diabetes with poor control noted lately.  He had stopped taking Metformin due to complaints of loose stools.  Now that he has stopped working as a  he is okay to resume Metformin    The following portions of the patient's history were reviewed and updated as appropriate: allergies, current medications, past family history, past medical history, past social history, past surgical history, and problem list.     Review of Systems   Constitutional: Positive for fatigue. Negative for activity change, appetite change and fever.   HENT: Negative for congestion, ear discharge, ear pain and trouble swallowing.    Eyes: Negative for photophobia and visual disturbance.   Respiratory: Negative for cough and shortness of breath.    Cardiovascular: Negative for chest pain and palpitations.   Gastrointestinal: Negative for abdominal distention, abdominal pain, constipation, diarrhea, nausea and vomiting.   Endocrine: Negative.    Genitourinary: Negative for dysuria, hematuria and urgency.   Musculoskeletal: Positive for arthralgias, gait problem and joint swelling. Negative for back pain and myalgias.   Skin: Negative for color change and rash.   Allergic/Immunologic: Negative.    Neurological: Negative for dizziness, weakness, light-headedness and headaches.   Hematological: Negative for adenopathy. Does not bruise/bleed easily.   Psychiatric/Behavioral: Negative for agitation, confusion and dysphoric mood. The patient is not nervous/anxious.        Visit Vitals  /70   Pulse 52   Temp 97.8 °F (36.6 °C) (Temporal)   Ht 177.8 cm (70\")   Wt 99.2 kg (218 lb 12.8 oz)   SpO2 98%   BMI 31.39 kg/m²       Objective  Physical Exam  Constitutional:       General: He is not in acute distress.     Appearance: He is " well-developed.   HENT:      Nose: Nose normal.   Eyes:      General: No scleral icterus.     Conjunctiva/sclera: Conjunctivae normal.   Neck:      Thyroid: No thyromegaly.      Trachea: No tracheal deviation.   Cardiovascular:      Rate and Rhythm: Normal rate and regular rhythm.      Heart sounds: No murmur. No friction rub.   Pulmonary:      Effort: No respiratory distress.      Breath sounds: No wheezing or rales.   Abdominal:      General: There is no distension.      Palpations: Abdomen is soft. There is no mass.      Tenderness: There is no abdominal tenderness. There is no guarding.   Musculoskeletal: Normal range of motion.         General: Deformity present.   Lymphadenopathy:      Cervical: No cervical adenopathy.   Skin:     General: Skin is warm and dry.      Findings: No erythema or rash.   Neurological:      Mental Status: He is alert and oriented to person, place, and time.      Cranial Nerves: No cranial nerve deficit.      Coordination: Coordination normal.      Deep Tendon Reflexes: Reflexes are normal and symmetric.   Psychiatric:         Behavior: Behavior normal.         Thought Content: Thought content normal.         Judgment: Judgment normal.         Diagnoses and all orders for this visit:    Atrial fibrillation, chronic (CMS/HCC) stable continue with beta-blockers and the anticoagulant therapy  -     Comprehensive Metabolic Panel  -     CBC (No Diff)  -     TSH    Type 2 diabetes mellitus with hyperglycemia, without long-term current use of insulin (CMS/HCC) resume Metformin at 1000 mg twice a day with food continue with glimepiride at 2 mg daily dietary counseling and exercise program discussed again    Essential hypertension able with current meds and low-salt diet    B12 deficiency with history of Metformin therapy.  Check levels  -     Vitamin B12    Other orders  -     glimepiride (Amaryl) 2 MG tablet; Take 1 tablet by mouth Every Morning Before Breakfast.

## 2020-11-25 RX ORDER — GLIMEPIRIDE 2 MG/1
2 TABLET ORAL
Qty: 90 TABLET | Refills: 3 | Status: SHIPPED | OUTPATIENT
Start: 2020-11-25 | End: 2020-12-21 | Stop reason: SDUPTHER

## 2020-12-21 NOTE — TELEPHONE ENCOUNTER
Caller: Yehuda Capellan    Relationship: Self    Best call back number: [phone]    Medication needed:   Requested Prescriptions     Pending Prescriptions Disp Refills   • glimepiride (Amaryl) 2 MG tablet 90 tablet 3     Sig: Take 1 tablet by mouth Every Morning Before Breakfast.       When do you need the refill by: 12/22/20     Does the patient have less than a 3 day supply:  [x] Yes  [] No    What is the patient's preferred pharmacy:    [facility] - [place] [address] [address] - [phone]  - [phone] FX

## 2020-12-22 RX ORDER — GLIMEPIRIDE 2 MG/1
2 TABLET ORAL
Qty: 90 TABLET | Refills: 3 | Status: SHIPPED | OUTPATIENT
Start: 2020-12-22 | End: 2021-01-18 | Stop reason: SDUPTHER

## 2021-01-08 ENCOUNTER — OFFICE VISIT (OUTPATIENT)
Dept: INTERNAL MEDICINE | Facility: CLINIC | Age: 66
End: 2021-01-08

## 2021-01-08 VITALS
DIASTOLIC BLOOD PRESSURE: 70 MMHG | HEIGHT: 70 IN | HEART RATE: 82 BPM | TEMPERATURE: 97.1 F | BODY MASS INDEX: 31.64 KG/M2 | SYSTOLIC BLOOD PRESSURE: 110 MMHG | WEIGHT: 221 LBS | OXYGEN SATURATION: 98 %

## 2021-01-08 DIAGNOSIS — I48.20 ATRIAL FIBRILLATION, CHRONIC (HCC): Primary | ICD-10-CM

## 2021-01-08 DIAGNOSIS — E11.65 TYPE 2 DIABETES MELLITUS WITH HYPERGLYCEMIA, WITHOUT LONG-TERM CURRENT USE OF INSULIN (HCC): ICD-10-CM

## 2021-01-08 DIAGNOSIS — I10 ESSENTIAL HYPERTENSION: ICD-10-CM

## 2021-01-08 LAB — HBA1C MFR BLD: 6.7 %

## 2021-01-08 PROCEDURE — 83036 HEMOGLOBIN GLYCOSYLATED A1C: CPT | Performed by: INTERNAL MEDICINE

## 2021-01-08 PROCEDURE — 99214 OFFICE O/P EST MOD 30 MIN: CPT | Performed by: INTERNAL MEDICINE

## 2021-01-08 NOTE — PROGRESS NOTES
"Subjective  Senjanae Capellan is a 65 y.o. male    HPI coming in for follow-up patient with diabetes with hypertension hyperlipidemia has been on oral hypoglycemic agents.  Has been more compliant with his dietary restrictions he denies chest pain or shortness of breath history of A. fib on metoprolol and Eliquis    The following portions of the patient's history were reviewed and updated as appropriate: allergies, current medications, past family history, past medical history, past social history, past surgical history, and problem list.     Review of Systems   Constitutional: Positive for fatigue. Negative for activity change, appetite change and fever.   HENT: Negative for congestion, ear discharge, ear pain and trouble swallowing.    Eyes: Negative for photophobia and visual disturbance.   Respiratory: Negative for cough and shortness of breath.    Cardiovascular: Negative for chest pain and palpitations.   Gastrointestinal: Negative for abdominal distention, abdominal pain, constipation, diarrhea, nausea and vomiting.   Endocrine: Negative.    Genitourinary: Negative for dysuria, hematuria and urgency.   Musculoskeletal: Positive for arthralgias. Negative for back pain, joint swelling and myalgias.   Skin: Negative for color change and rash.   Allergic/Immunologic: Negative.    Neurological: Negative for dizziness, weakness, light-headedness and headaches.   Hematological: Negative for adenopathy. Does not bruise/bleed easily.   Psychiatric/Behavioral: Negative for agitation, confusion and dysphoric mood. The patient is not nervous/anxious.        Visit Vitals  /70   Pulse 82   Temp 97.1 °F (36.2 °C) (Temporal)   Ht 177.8 cm (70\")   Wt 100 kg (221 lb)   SpO2 98%   BMI 31.71 kg/m²       Objective  Physical Exam  Constitutional:       General: He is not in acute distress.     Appearance: He is well-developed.   HENT:      Nose: Nose normal.   Eyes:      General: No scleral icterus.     Conjunctiva/sclera: " Conjunctivae normal.   Neck:      Thyroid: No thyromegaly.      Trachea: No tracheal deviation.   Cardiovascular:      Rate and Rhythm: Normal rate and regular rhythm.      Heart sounds: No murmur. No friction rub.   Pulmonary:      Effort: No respiratory distress.      Breath sounds: No wheezing or rales.   Abdominal:      General: There is no distension.      Palpations: Abdomen is soft. There is no mass.      Tenderness: There is no abdominal tenderness. There is no guarding.   Musculoskeletal: Normal range of motion.         General: Deformity present.   Feet:      Right foot:      Protective Sensation: 1 site tested. 1 site sensed.     Skin integrity: Skin integrity normal.      Left foot:      Protective Sensation: 1 site tested. 1 site sensed.     Skin integrity: Skin integrity normal.   Lymphadenopathy:      Cervical: No cervical adenopathy.   Skin:     General: Skin is warm and dry.      Findings: No erythema or rash.   Neurological:      Mental Status: He is alert and oriented to person, place, and time.      Cranial Nerves: No cranial nerve deficit.      Coordination: Coordination normal.      Deep Tendon Reflexes: Reflexes are normal and symmetric.   Psychiatric:         Behavior: Behavior normal.         Thought Content: Thought content normal.         Judgment: Judgment normal.         Diagnoses and all orders for this visit:    Atrial fibrillation, chronic (CMS/HCC) rate control okay continue with current meds    Type 2 diabetes mellitus with hyperglycemia, without long-term current use of insulin (CMS/[facility]) A1c shows better control at 6.7.  Discussed dietary restrictions and exercise program    Essential hypertension stable with current meds and low-salt diet    Other orders  -     metFORMIN (Glucophage) 1000 MG tablet; Take 1 tablet by mouth 2 (Two) Times a Day With Meals.

## 2021-01-18 RX ORDER — BLOOD-GLUCOSE METER
KIT MISCELLANEOUS
Qty: 1 EACH | Refills: 1 | Status: SHIPPED | OUTPATIENT
Start: 2021-01-18

## 2021-01-18 RX ORDER — APIXABAN 5 MG/1
5 TABLET, FILM COATED ORAL EVERY 12 HOURS SCHEDULED
Qty: 180 TABLET | Refills: 3 | Status: SHIPPED | OUTPATIENT
Start: 2021-01-18 | End: 2022-01-10 | Stop reason: SDUPTHER

## 2021-01-18 RX ORDER — GLIMEPIRIDE 2 MG/1
2 TABLET ORAL
Qty: 90 TABLET | Refills: 3 | Status: SHIPPED | OUTPATIENT
Start: 2021-01-18 | End: 2022-01-10 | Stop reason: SDUPTHER

## 2021-01-18 NOTE — TELEPHONE ENCOUNTER
Caller: [facility]    Relationship: PHARMACY    Best call back number: [phone]    Medication needed:   Requested Prescriptions     Pending Prescriptions Disp Refills   • glucose monitor monitoring kit 1 each 1     Sig: ACCU-CHECK ABISAI PLUS METER as requested.   • glimepiride (Amaryl) 2 MG tablet 90 tablet 3     Sig: Take 1 tablet by mouth Every Morning Before Breakfast.   • Eliquis 5 MG tablet tablet 180 tablet 3     Sig: Take 1 tablet by mouth Every 12 (Twelve) Hours.       When do you need the refill by: ASAP    What details did the patient provide when requesting the medication:     Does the patient have less than a 3 day supply:  [] Yes  [x] No    What is the patient's preferred pharmacy: [facility] - [facility] [address] - [phone]  - [phone]

## 2021-01-21 DIAGNOSIS — E11.65 UNCONTROLLED TYPE 2 DIABETES MELLITUS WITH HYPERGLYCEMIA, WITHOUT LONG-TERM CURRENT USE OF INSULIN (HCC): ICD-10-CM

## 2021-01-21 RX ORDER — BLOOD GLUCOSE CONTROL HIGH,LOW
EACH MISCELLANEOUS
Qty: 1 EACH | Refills: 3 | Status: SHIPPED | OUTPATIENT
Start: 2021-01-21

## 2021-01-21 RX ORDER — BLOOD PRESSURE TEST KIT
KIT MISCELLANEOUS
Qty: 300 EACH | Refills: 3 | Status: SHIPPED | OUTPATIENT
Start: 2021-01-21 | End: 2022-01-10 | Stop reason: SDUPTHER

## 2021-02-15 RX ORDER — ATORVASTATIN CALCIUM 20 MG/1
20 TABLET, FILM COATED ORAL NIGHTLY
Qty: 30 TABLET | Refills: 0 | Status: SHIPPED | OUTPATIENT
Start: 2021-02-15 | End: 2021-05-13 | Stop reason: SDUPTHER

## 2021-04-01 RX ORDER — TRAZODONE HYDROCHLORIDE 50 MG/1
50 TABLET ORAL NIGHTLY
Qty: 90 TABLET | Refills: 3 | Status: SHIPPED | OUTPATIENT
Start: 2021-04-01 | End: 2022-01-10 | Stop reason: SDUPTHER

## 2021-04-08 ENCOUNTER — OFFICE VISIT (OUTPATIENT)
Dept: INTERNAL MEDICINE | Facility: CLINIC | Age: 66
End: 2021-04-08

## 2021-04-08 VITALS
WEIGHT: 225.4 LBS | DIASTOLIC BLOOD PRESSURE: 79 MMHG | BODY MASS INDEX: 32.27 KG/M2 | OXYGEN SATURATION: 98 % | SYSTOLIC BLOOD PRESSURE: 130 MMHG | HEART RATE: 76 BPM | HEIGHT: 70 IN | TEMPERATURE: 96.8 F

## 2021-04-08 DIAGNOSIS — I48.20 ATRIAL FIBRILLATION, CHRONIC (HCC): ICD-10-CM

## 2021-04-08 DIAGNOSIS — E11.65 TYPE 2 DIABETES MELLITUS WITH HYPERGLYCEMIA, WITHOUT LONG-TERM CURRENT USE OF INSULIN (HCC): Primary | ICD-10-CM

## 2021-04-08 DIAGNOSIS — R22.1 NECK MASS: ICD-10-CM

## 2021-04-08 DIAGNOSIS — I10 ESSENTIAL HYPERTENSION: ICD-10-CM

## 2021-04-08 LAB — HBA1C MFR BLD: 6.31 %

## 2021-04-08 PROCEDURE — 83036 HEMOGLOBIN GLYCOSYLATED A1C: CPT | Performed by: INTERNAL MEDICINE

## 2021-04-08 PROCEDURE — 99214 OFFICE O/P EST MOD 30 MIN: CPT | Performed by: INTERNAL MEDICINE

## 2021-04-08 NOTE — PROGRESS NOTES
Subjective  Senate MARILYNN Capellan is a 65 y.o. male    HPI  The patient reports for routine follow-up. Overall feeling well, aside from allergies. Feels more tired, attributes this to aging. States sugars have been good with readings from 120 to 140. Depends on how he sleeps at night. Reports mass on neck. Been present for a while. Denies dysphagia. No testing done on this before. Reports weight gain. If he is walking his back tends to hurt. Smoker for 12 years, not currently. Denies ulcers or sores on feet. Has bad toe from fracture x 3. Sometimes feet feel numb. Has not had COVID-19 vaccine.     The following portions of the patient's history were reviewed and updated as appropriate: allergies, current medications, past family history, past medical history, past social history, past surgical history, and problem list.     Review of Systems   Constitutional: Negative.  Negative for activity change, appetite change, fatigue and fever.   HENT: Negative for congestion, ear discharge, ear pain and trouble swallowing.         Neck mass   Eyes: Negative for photophobia and visual disturbance.   Respiratory: Negative for cough and shortness of breath.    Cardiovascular: Negative for chest pain and palpitations.   Gastrointestinal: Negative for abdominal distention, abdominal pain, constipation, diarrhea, nausea and vomiting.   Endocrine: Negative.    Genitourinary: Negative for dysuria, hematuria and urgency.   Musculoskeletal: Positive for arthralgias and back pain. Negative for joint swelling and myalgias.   Skin: Negative for color change and rash.   Allergic/Immunologic: Negative.    Neurological: Negative for dizziness, weakness, light-headedness and headaches.   Hematological: Negative for adenopathy. Does not bruise/bleed easily.   Psychiatric/Behavioral: Negative for agitation, confusion and dysphoric mood. The patient is not nervous/anxious.        Visit Vitals  /79   Pulse 76   Temp 96.8 °F (36 °C) (Infrared)  "  Ht 177.8 cm (70\")   Wt 102 kg (225 lb 6.4 oz)   SpO2 98%   BMI 32.34 kg/m²       Objective  Physical Exam  Constitutional:       General: He is not in acute distress.     Appearance: He is well-developed.   HENT:      Nose: Nose normal.   Eyes:      General: No scleral icterus.     Conjunctiva/sclera: Conjunctivae normal.   Neck:      Thyroid: No thyromegaly.      Trachea: No tracheal deviation.   Cardiovascular:      Rate and Rhythm: Normal rate and regular rhythm.      Pulses:           Dorsalis pedis pulses are 1+ on the right side and 1+ on the left side.      Heart sounds: No murmur heard.   No friction rub.   Pulmonary:      Effort: No respiratory distress.      Breath sounds: No wheezing or rales.   Abdominal:      General: There is no distension.      Palpations: Abdomen is soft. There is no mass.      Tenderness: There is no abdominal tenderness. There is no guarding.   Musculoskeletal:         General: No deformity. Normal range of motion.   Feet:      Right foot:      Protective Sensation: 1 site tested. 1 site sensed.     Skin integrity: Skin integrity normal.      Toenail Condition: Right toenails are abnormally thick.      Left foot:      Protective Sensation: 1 site tested. 1 site sensed.     Skin integrity: Skin integrity normal.      Toenail Condition: Left toenails are abnormally thick.   Lymphadenopathy:      Cervical: No cervical adenopathy.   Skin:     General: Skin is warm and dry.      Findings: No erythema or rash.   Neurological:      Mental Status: He is alert and oriented to person, place, and time.      Cranial Nerves: No cranial nerve deficit.      Coordination: Coordination normal.      Deep Tendon Reflexes: Reflexes are normal and symmetric.   Psychiatric:         Behavior: Behavior normal.         Thought Content: Thought content normal.         Judgment: Judgment normal.         Diagnoses and all orders for this visit:    Type 2 diabetes mellitus with hyperglycemia, without " long-term current use of insulin (CMS/HCC) A1c shows better control discussed dietary restrictions and weight loss  -     POC Glycosylated Hemoglobin (Hb A1C)    Atrial fibrillation, chronic (CMS/HCC) stable rate control okay on anticoagulant therapy    Essential hypertension stable with current meds low-salt diet    Neck mass check ultrasound rule out thyroid or neck lesion  -     US Thyroid, will call patient with results. Follow-up in 3 months.    Scribed for Jeff Sanchez MD by Ivelisse Finch.  04/08/21   18:57 EDT    I have personally performed the services described in this document as scribed by the above individual, and it is both accurate and complete.  Jeff Sanchez MD  4/12/2021  17:07 EDT

## 2021-04-09 ENCOUNTER — IMMUNIZATION (OUTPATIENT)
Dept: VACCINE CLINIC | Facility: HOSPITAL | Age: 66
End: 2021-04-09

## 2021-04-09 PROCEDURE — 0001A: CPT | Performed by: INTERNAL MEDICINE

## 2021-04-09 PROCEDURE — 91300 HC SARSCOV02 VAC 30MCG/0.3ML IM: CPT | Performed by: INTERNAL MEDICINE

## 2021-04-21 ENCOUNTER — HOSPITAL ENCOUNTER (OUTPATIENT)
Dept: ULTRASOUND IMAGING | Facility: HOSPITAL | Age: 66
Discharge: HOME | End: 2021-04-21
Admitting: INTERNAL MEDICINE

## 2021-04-21 DIAGNOSIS — R22.1 NECK MASS: ICD-10-CM

## 2021-04-21 PROCEDURE — 76536 US EXAM OF HEAD AND NECK: CPT

## 2021-04-22 DIAGNOSIS — E04.9 GOITER, NODULAR: Primary | ICD-10-CM

## 2021-04-29 ENCOUNTER — IMMUNIZATION (OUTPATIENT)
Dept: VACCINE CLINIC | Facility: HOSPITAL | Age: 66
End: 2021-04-29

## 2021-04-29 PROCEDURE — 0002A: CPT | Performed by: INTERNAL MEDICINE

## 2021-04-29 PROCEDURE — 91300 HC SARSCOV02 VAC 30MCG/0.3ML IM: CPT | Performed by: INTERNAL MEDICINE

## 2021-05-13 RX ORDER — ATORVASTATIN CALCIUM 20 MG/1
20 TABLET, FILM COATED ORAL NIGHTLY
Qty: 90 TABLET | Refills: 3 | Status: SHIPPED | OUTPATIENT
Start: 2021-05-13 | End: 2022-01-10 | Stop reason: SDUPTHER

## 2021-05-13 NOTE — TELEPHONE ENCOUNTER
Caller: [facility] - [place], [place] - [address] - [phone] PH - [phone] FX    Relationship: Pharmacy    Best call back number: [phone]    Medication needed:   Requested Prescriptions     Pending Prescriptions Disp Refills   • atorvastatin (LIPITOR) 20 MG tablet 30 tablet 0     Sig: Take 1 tablet by mouth Every Night.       When do you need the refill by: 05-13-21    What additional details did the patient provide when requesting the medication: HUMAN STATED THAT PATIENT WOULD NEED 90 DAY SUPPLY OF THIS MEDICATION SENT TO [facility]    Does the patient have less than a 3 day supply:  [] Yes  [x] No    What is the patient's preferred pharmacy: [facility] - [place], [place] - [address] - [phone] [facility] [phone] FX<br>[facility] - [place] - [address] - [phone] PH - [phone] FX

## 2021-06-17 NOTE — TELEPHONE ENCOUNTER
[facility]: [facility] - [facility] [address] [facility] [address] - [phone]  - [phone] FX  [place]    Relationship: Pharmacy    Best call back number:  [phone]    Medication needed:   Requested Prescriptions     Pending Prescriptions Disp Refills   • metoprolol tartrate (LOPRESSOR) 50 MG tablet 30 tablet 0     Sig: Take 1 tablet by mouth 2 (Two) Times a Day.   LANCETS ACCU CHECK SOFT CLICK  TEST STRIPS ABISAI ACCU HCECK    When do you need the refill by: 06/18/21    What additional details did the patient provide when requesting the medication:     Does the patient have less than a 3 day supply:  [x] Yes  [] No    What is the patient's preferred pharmacy: [facility] - [place], [place] - [address] - [phone]  - [phone] FX<br>[facility] - [place] - [facility] [address] - [phone] PH - [phone] FX

## 2021-06-18 RX ORDER — METOPROLOL TARTRATE 50 MG/1
50 TABLET, FILM COATED ORAL 2 TIMES DAILY
Qty: 90 TABLET | Refills: 3 | Status: SHIPPED | OUTPATIENT
Start: 2021-06-18 | End: 2022-01-10 | Stop reason: SDUPTHER

## 2021-06-18 NOTE — TELEPHONE ENCOUNTER
LOV 4/8/2021  FOV 07/09/2021   PT DAILY TREATMENT NOTE     Patient Name: Ronny Goldberg  Date:2017  : 1972  [x]  Patient  Verified  Payor: [address] / Plan: [address] [facility] / Product Type: Managed Care Medicaid /    In time:3:59  Out time:5:06  Total Treatment Time (min): 67  Total Timed Codes (min): 57  1:1 Treatment Time (min): 62   Visit #: 6 of     Treatment Area: Acute pain of left knee [M25.562]    SUBJECTIVE  Pain Level (0-10 scale): 7  Any medication changes, allergies to medications, adverse drug reactions, diagnosis change, or new procedure performed?: [x] No    [] Yes (see summary sheet for update)  Subjective functional status/changes:   [] No changes reported  The L knee has been bothering me since this morning. Yesterday I did a TON of walking everywhere. After I got home I just watched TV laying in the bed all day. The pain has been 7/10 all day, constant. The pain is deeper to the kneecap.      OBJECTIVE  Modality rationale: decrease inflammation and decrease pain to improve the patients ability to improve comfort with descending stairs    Min Type Additional Details    [] Estim: []Att   []Unatt        []TENS instruct                  []IFC  []Premod   []NMES                     []Other:  []w/US   []w/ice   []w/heat  Position:  Location:    []  Traction: [] Cervical       []Lumbar                       [] Prone          []Supine                       []Intermittent   []Continuous Lbs:  [] before manual  [] after manual    []  Ultrasound: []Continuous   [] Pulsed                           []1MHz   []3MHz Location:  W/cm2:    []  Iontophoresis with dexamethasone         Location: [] Take home patch   [] In clinic   10 [x]  Ice     []  heat  []  Ice massage Position: semi-reclined  Location: (B) knees    []  Vasopneumatic Device Pressure:       [] lo [] med [] hi   Temperature: [] lo [] med [] hi   [x] Skin assessment post-treatment:  [x]intact []redness- no adverse reaction []redness - adverse reaction:       47 min Therapeutic Exercise:  [x] See flow sheet :   Rationale: increase ROM, increase strength and improve coordination to improve the patients ability to iprove descending stairs pain-free         10 min MT:  DTM and TrP release to (B) gastroc in prone    Rationale: increase ROM, increase strength and improve coordination to improve the patients ability to iprove descending stairs pain-free           x min Patient Education: [x] Review HEP    [] Progressed/Changed HEP based on:   [] positioning   [] body mechanics   [] transfers   [x] heat/ice application 0-1A/GNI for 10 minutes to decrease c/o pain        Other Objective/Functional Measures:   Posterior drawer (-) (B)  100% patellar mobility (B), crepitus with inferior glide (L)     Pain Level (0-10 scale) post treatment: 1    ASSESSMENT/Changes in Function: Pt with significant TrP in the medial > lateral gastroc and solue. Co'nt with tightness and weakness. Great fatigue noted with TE today. Patient will continue to benefit from skilled PT services to modify and progress therapeutic interventions, address functional mobility deficits, address ROM deficits, address strength deficits, analyze and address soft tissue restrictions, analyze and cue movement patterns, analyze and modify body mechanics/ergonomics, assess and modify postural abnormalities, address imbalance/dizziness and instruct in home and community integration to attain remaining goals. []  See Plan of Care  []  See progress note/recertification  []  See Discharge Summary         Progress towards goals / Updated goals:  Short Term Goals: To be accomplished in 2 treatments:  1. Pt will be independent and compliant with HEP to decrease pain, increase ROM and return pt to PLOF. -Goal met; patient reporting compliance (4/25/17)  Long Term Goals: To be accomplished in 8-12 treatments:  1.  Pt will have an increase in FOTO to > or = 64/100 to demo an increase in functional activity tolerance  2. Pt will have an increase in (B) hip and knee MMT to > or = 4+/5 all planes to improve ecccentric control for stair descent. -Good progressing with SLR x 4 (5/417) but fatigue noted  3. Pt will an increase in SLS with EO to > or = 7\" with normal sway and no hip drop to improve control for stairs, curbs. -Goal met; (L) 10 seconds, (R) 8 seconds (4/25/17)  4. Pt will be able to descend a full flight of stairs in the clinic with pain no > than 3/10 to improve prognosis and in-home mobility  -Goal progressing; intermittent 4/10 pain in (L) knee with stair descent, able to correct valgus collapse with improved comfort (4/27/17)    PLAN  []  Upgrade activities as tolerated     []  Continue plan of care  []  Update interventions per flow sheet       []  Discharge due to:_  []  Other:_  Assess response to addition of MT today for gastroc/soleus tightess. Assess use of ice for pain management at home.      Kvng Kilgore, PT 5/4/2017  1:33 PM

## 2021-06-29 NOTE — TELEPHONE ENCOUNTER
Caller: Yehuda Capellan    Relationship: Self    Best call back number: [phone]    Medication needed:   Requested Prescriptions     Pending Prescriptions Disp Refills   • metFORMIN (Glucophage) 1000 MG tablet       Sig: Take 1 tablet by mouth 2 (Two) Times a Day With Meals.       When do you need the refill by: TODAY    What additional details did the patient provide when requesting the medication: N/A    Does the patient have less than a 3 day supply:  [] Yes  [x] No    What is the patient's preferred pharmacy: [facility] - [facility] [address] - [phone]  - [phone] FX

## 2021-07-09 ENCOUNTER — OFFICE VISIT (OUTPATIENT)
Dept: INTERNAL MEDICINE | Facility: CLINIC | Age: 66
End: 2021-07-09

## 2021-07-09 VITALS
OXYGEN SATURATION: 98 % | HEART RATE: 61 BPM | BODY MASS INDEX: 32.51 KG/M2 | WEIGHT: 227.12 LBS | SYSTOLIC BLOOD PRESSURE: 132 MMHG | HEIGHT: 70 IN | TEMPERATURE: 97.3 F | DIASTOLIC BLOOD PRESSURE: 70 MMHG

## 2021-07-09 DIAGNOSIS — I10 ESSENTIAL HYPERTENSION: ICD-10-CM

## 2021-07-09 DIAGNOSIS — E11.65 TYPE 2 DIABETES MELLITUS WITH HYPERGLYCEMIA, WITHOUT LONG-TERM CURRENT USE OF INSULIN (HCC): ICD-10-CM

## 2021-07-09 DIAGNOSIS — I48.20 ATRIAL FIBRILLATION, CHRONIC (HCC): Primary | ICD-10-CM

## 2021-07-09 PROCEDURE — 1159F MED LIST DOCD IN RCRD: CPT | Performed by: INTERNAL MEDICINE

## 2021-07-09 PROCEDURE — 99397 PER PM REEVAL EST PAT 65+ YR: CPT | Performed by: INTERNAL MEDICINE

## 2021-07-09 PROCEDURE — 1170F FXNL STATUS ASSESSED: CPT | Performed by: INTERNAL MEDICINE

## 2021-07-09 PROCEDURE — 96160 PT-FOCUSED HLTH RISK ASSMT: CPT | Performed by: INTERNAL MEDICINE

## 2021-07-09 PROCEDURE — G0439 PPPS, SUBSEQ VISIT: HCPCS | Performed by: INTERNAL MEDICINE

## 2021-07-09 RX ORDER — LOSARTAN POTASSIUM AND HYDROCHLOROTHIAZIDE 12.5; 1 MG/1; MG/1
1 TABLET ORAL DAILY
Qty: 90 TABLET | Refills: 3 | Status: SHIPPED | OUTPATIENT
Start: 2021-07-09 | End: 2022-01-10 | Stop reason: SDUPTHER

## 2021-07-09 NOTE — PROGRESS NOTES
The ABCs of the Annual Wellness Visit  Subsequent Medicare Wellness Visit    Chief Complaint   Patient presents with   • Medicare Wellness-subsequent       Subjective   History of Present Illness:  Yehuda Capellan is a 66 y.o. male who presents for a Subsequent Medicare Wellness Visit.    HEALTH RISK ASSESSMENT    Recent Hospitalizations:  No hospitalization(s) within the last year.    Current Medical Providers:  Patient Care Team:  Jeff Sanchez MD as PCP - General (Internal Medicine)  Jeff Sanchez MD (Internal Medicine)    Smoking Status:  Social History     Tobacco Use   Smoking Status Former Smoker   • Packs/day: 3.00   • Years: 20.00   • Pack years: 60.00   • Types: Cigarettes   • Quit date:    • Years since quittin.5   Smokeless Tobacco Never Used       Alcohol Consumption:  Social History     Substance and Sexual Activity   Alcohol Use No       Depression Screen:   PHQ-2/PHQ-9 Depression Screening 2021   Little interest or pleasure in doing things 0   Feeling down, depressed, or hopeless 0   Total Score 0       Fall Risk Screen:  STEADI Fall Risk Assessment was completed, and patient is at LOW risk for falls.Assessment completed on:2021    Health Habits and Functional and Cognitive Screening:  Functional & Cognitive Status 2021   Do you have difficulty preparing food and eating? No   Do you have difficulty bathing yourself, getting dressed or grooming yourself? No   Do you have difficulty using the toilet? No   Do you have difficulty moving around from place to place? No   Do you have trouble with steps or getting out of a bed or a chair? No   Current Diet Well Balanced Diet   Dental Exam Up to date        Dental Exam Comment -   Eye Exam Up to date        Eye Exam Comment -   Exercise (times per week) 0 times per week   Current Exercises Include No Regular Exercise   Current Exercise Activities Include -   Do you need help using the phone?  No   Are you deaf or do you have  serious difficulty hearing?  Yes   Do you need help with transportation? No   Do you need help shopping? No   Do you need help preparing meals?  No   Do you need help with housework?  No   Do you need help with laundry? No   Do you need help taking your medications? No   Do you need help managing money? No   Do you ever drive or ride in a car without wearing a seat belt? No   Have you felt unusual stress, anger or loneliness in the last month? No   Who do you live with? Spouse   If you need help, do you have trouble finding someone available to you? No   Have you been bothered in the last four weeks by sexual problems? No   Do you have difficulty concentrating, remembering or making decisions? No         Does the patient have evidence of cognitive impairment? No    Asprin use counseling:Taking ASA appropriately as indicated    Age-appropriate Screening Schedule:  Refer to the list below for future screening recommendations based on patient's age, sex and/or medical conditions. Orders for these recommended tests are listed in the plan section. The patient has been provided with a written plan.    Health Maintenance   Topic Date Due   • TDAP/TD VACCINES (1 - Tdap) [birth year]/1972   • DIABETIC EYE EXAM  05/25/2019   • DIABETIC FOOT EXAM  02/26/2021   • LIPID PANEL  02/26/2021   • URINE MICROALBUMIN  02/26/2021   • ZOSTER VACCINE (2 of 3) 08/16/2029 (Originally 10/24/2014)   • INFLUENZA VACCINE  08/01/2021   • HEMOGLOBIN A1C  10/08/2021          The following portions of the patient's history were reviewed and updated as appropriate: allergies, current medications, past family history, past medical history, past social history, past surgical history and problem list.    Outpatient Medications Prior to Visit   Medication Sig Dispense Refill   • Alcohol Swabs pads Use tid e11.9 300 each 3   • aspirin 81 MG tablet Take  by mouth Daily.     • atorvastatin (LIPITOR) 20 MG tablet Take 1 tablet by mouth Every Night. 90 tablet 3   •  Blood Glucose Calibration (Accu-Chek Darcy) solution Use as directed. E11.9 1 each 3   • Eliquis 5 MG tablet tablet Take 1 tablet by mouth Every 12 (Twelve) Hours. 180 tablet 3   • glimepiride (Amaryl) 2 MG tablet Take 1 tablet by mouth Every Morning Before Breakfast. 90 tablet 3   • glucose blood test strip Use as instructed 100 each 3   • glucose monitor monitoring kit ACCU-CHECK DARCY PLUS METER as requested. 1 each 1   • Lancets Misc. misc TEST TWICE A DAY FOR DM. E11.9. patient requests acc-check softclick 200 each 3   • losartan-hydrochlorothiazide (HYZAAR) 100-12.5 MG per tablet Take 1 tablet by mouth Daily. 30 tablet 0   • meclizine (ANTIVERT) 25 MG tablet Take 1 tablet by mouth Every 6 (Six) Hours As Needed for Dizziness. 15 tablet 0   • metFORMIN (Glucophage) 1000 MG tablet Take 1 tablet by mouth 2 (Two) Times a Day With Meals. 180 tablet 3   • metoprolol tartrate (LOPRESSOR) 50 MG tablet Take 1 tablet by mouth 2 (Two) Times a Day. 90 tablet 3   • traZODone (DESYREL) 50 MG tablet TAKE 1 TABLET BY MOUTH EVERY NIGHT. 90 tablet 3   • cetirizine (zyrTEC) 10 MG tablet        No facility-administered medications prior to visit.       Patient Active Problem List   Diagnosis   • Atrial fibrillation, chronic (CMS/HCC)   • Dyslipidemia   • Essential hypertension   • Joint pain, hip   • Type 2 diabetes mellitus with hyperglycemia, without long-term current use of insulin (CMS/HCC)   • Pain in both hands       Advanced Care Planning:  ACP discussion was held with the patient during this visit. Patient has an advance directive in EMR which is still valid.     Review of Systems   Constitutional: Positive for fatigue. Negative for activity change, appetite change and fever.   HENT: Negative for congestion, ear discharge, ear pain and trouble swallowing.    Eyes: Negative for photophobia and visual disturbance.   Respiratory: Positive for shortness of breath. Negative for cough.    Cardiovascular: Negative for chest pain  "and palpitations.   Gastrointestinal: Negative for abdominal distention, abdominal pain, constipation, diarrhea, nausea and vomiting.   Endocrine: Negative.    Genitourinary: Negative for dysuria, hematuria and urgency.   Musculoskeletal: Positive for arthralgias. Negative for back pain, joint swelling and myalgias.   Skin: Negative for color change and rash.   Allergic/Immunologic: Negative.    Neurological: Negative for dizziness, weakness, light-headedness and headaches.   Hematological: Negative for adenopathy. Does not bruise/bleed easily.   Psychiatric/Behavioral: Negative for agitation, confusion and dysphoric mood. The patient is not nervous/anxious.        Compared to one year ago, the patient feels his physical health is the same.  Compared to one year ago, the patient feels his mental health is the same.    Reviewed chart for potential of high risk medication in the elderly: yes  Reviewed chart for potential of harmful drug interactions in the elderly:yes    Objective         Vitals:    07/09/21 1419   BP: 132/70   Pulse: 61   Temp: 97.3 °F (36.3 °C)   TempSrc: Infrared   SpO2: 98%   Weight: 103 kg (227 lb 1.9 oz)   Height: 177.8 cm (70\")   PainSc: 0-No pain       Body mass index is 32.59 kg/m².  Discussed the patient's BMI with him. The BMI is above average; BMI management plan is completed.    Physical Exam  Constitutional:       General: He is not in acute distress.     Appearance: He is well-developed.   HENT:      Nose: Nose normal.   Eyes:      General: No scleral icterus.     Conjunctiva/sclera: Conjunctivae normal.   Neck:      Thyroid: No thyromegaly.      Trachea: No tracheal deviation.   Cardiovascular:      Rate and Rhythm: Normal rate. Rhythm irregular.      Heart sounds: No murmur heard.   No friction rub.   Pulmonary:      Effort: No respiratory distress.      Breath sounds: No wheezing or rales.   Abdominal:      General: There is no distension.      Palpations: Abdomen is soft. There is " no mass.      Tenderness: There is no abdominal tenderness. There is no guarding.   Musculoskeletal:         General: No deformity. Normal range of motion.   Lymphadenopathy:      Cervical: No cervical adenopathy.   Skin:     General: Skin is warm and dry.      Findings: No erythema or rash.   Neurological:      Mental Status: He is alert and oriented to person, place, and time.      Cranial Nerves: No cranial nerve deficit.      Coordination: Coordination normal.      Deep Tendon Reflexes: Reflexes are normal and symmetric.   Psychiatric:         Behavior: Behavior normal.         Thought Content: Thought content normal.         Judgment: Judgment normal.               Assessment/Plan   Medicare Risks and Personalized Health Plan  CMS Preventative Services Quick Reference  Advance Directive Discussion  Obesity/Overweight   Polypharmacy    The above risks/problems have been discussed with the patient.  Pertinent information has been shared with the patient in the After Visit Summary.  Follow up plans and orders are seen below in the Assessment/Plan Section.    Diagnoses and all orders for this visit:    1. Atrial fibrillation, chronic (CMS/[facility]) (Primary) rate control okay continue with metoprolol.  Anticoagulant therapy with Eliquis    2. Type 2 diabetes mellitus with hyperglycemia, without long-term current use of insulin (CMS/[facility]) stable follow A1c continue with the dietary restrictions    3. Essential hypertension stable with current meds and low-salt diet    Other orders  -     glucose blood test strip; Check glucose daily for DM. E11.65  Dispense: 100 each; Refill: 3  -     losartan-hydrochlorothiazide (HYZAAR) 100-12.5 MG per tablet; Take 1 tablet by mouth Daily.  Dispense: 90 tablet; Refill: 3      Follow Up:  No follow-ups on file.     An After Visit Summary and PPPS were given to the patient.

## 2021-07-10 LAB
ALBUMIN SERPL-MCNC: 5 G/DL (ref 3.5–5.2)
ALBUMIN/CREAT UR: 13 MG/G CREAT (ref 0–29)
ALBUMIN/GLOB SERPL: 2.5 G/DL
ALP SERPL-CCNC: 61 U/L (ref 39–117)
ALT SERPL-CCNC: 24 U/L (ref 1–41)
AST SERPL-CCNC: 19 U/L (ref 1–40)
BILIRUB SERPL-MCNC: 0.4 MG/DL (ref 0–1.2)
BUN SERPL-MCNC: 20 MG/DL (ref 8–23)
BUN/CREAT SERPL: 24.7 (ref 7–25)
CALCIUM SERPL-MCNC: 9.8 MG/DL (ref 8.6–10.5)
CHLORIDE SERPL-SCNC: 102 MMOL/L (ref 98–107)
CO2 SERPL-SCNC: 23.7 MMOL/L (ref 22–29)
CREAT SERPL-MCNC: 0.81 MG/DL (ref 0.76–1.27)
CREAT UR-MCNC: 165.8 MG/DL
GLOBULIN SER CALC-MCNC: 2 GM/DL
GLUCOSE SERPL-MCNC: 114 MG/DL (ref 65–99)
HBA1C MFR BLD: 6.2 % (ref 4.8–5.6)
LDLC SERPL DIRECT ASSAY-MCNC: 63 MG/DL (ref 0–99)
MICROALBUMIN UR-MCNC: 20.8 UG/ML
POTASSIUM SERPL-SCNC: 4.4 MMOL/L (ref 3.5–5.2)
PROT SERPL-MCNC: 7 G/DL (ref 6–8.5)
SODIUM SERPL-SCNC: 141 MMOL/L (ref 136–145)

## 2021-08-29 ENCOUNTER — HOSPITAL ENCOUNTER (EMERGENCY)
Facility: HOSPITAL | Age: 66
Discharge: HOME | End: 2021-08-29
Attending: EMERGENCY MEDICINE | Admitting: EMERGENCY MEDICINE

## 2021-08-29 ENCOUNTER — APPOINTMENT (OUTPATIENT)
Dept: CT IMAGING | Facility: HOSPITAL | Age: 66
End: 2021-08-29

## 2021-08-29 VITALS
HEART RATE: 64 BPM | DIASTOLIC BLOOD PRESSURE: 89 MMHG | HEIGHT: 70 IN | OXYGEN SATURATION: 96 % | WEIGHT: 220 LBS | SYSTOLIC BLOOD PRESSURE: 139 MMHG | RESPIRATION RATE: 16 BRPM | TEMPERATURE: 97.5 F | BODY MASS INDEX: 31.5 KG/M2

## 2021-08-29 DIAGNOSIS — S01.312A COMPLEX LACERATION OF LEFT EAR, INITIAL ENCOUNTER: Primary | ICD-10-CM

## 2021-08-29 PROCEDURE — 70486 CT MAXILLOFACIAL W/O DYE: CPT

## 2021-08-29 PROCEDURE — 99283 EMERGENCY DEPT VISIT LOW MDM: CPT

## 2021-08-29 PROCEDURE — 25010000003 LIDOCAINE 1 % SOLUTION: Performed by: PHYSICIAN ASSISTANT

## 2021-08-29 PROCEDURE — 99282 EMERGENCY DEPT VISIT SF MDM: CPT

## 2021-08-29 PROCEDURE — 70450 CT HEAD/BRAIN W/O DYE: CPT

## 2021-08-29 RX ORDER — LIDOCAINE HYDROCHLORIDE 10 MG/ML
10 INJECTION, SOLUTION INFILTRATION; PERINEURAL ONCE
Status: COMPLETED | OUTPATIENT
Start: 2021-08-29 | End: 2021-08-29

## 2021-08-29 RX ORDER — CEPHALEXIN 500 MG/1
500 CAPSULE ORAL 4 TIMES DAILY
Qty: 12 CAPSULE | Refills: 0 | Status: SHIPPED | OUTPATIENT
Start: 2021-08-29 | End: 2021-09-01

## 2021-08-29 RX ORDER — HYDROCODONE BITARTRATE AND ACETAMINOPHEN 5; 325 MG/1; MG/1
1 TABLET ORAL ONCE
Status: COMPLETED | OUTPATIENT
Start: 2021-08-29 | End: 2021-08-29

## 2021-08-29 RX ADMIN — HYDROCODONE BITARTRATE AND ACETAMINOPHEN 1 TABLET: 5; 325 TABLET ORAL at 15:05

## 2021-08-29 RX ADMIN — LIDOCAINE HYDROCHLORIDE 10 ML: 10 INJECTION, SOLUTION INFILTRATION; PERINEURAL at 15:05

## 2021-08-30 ENCOUNTER — OFFICE VISIT (OUTPATIENT)
Dept: INTERNAL MEDICINE | Facility: CLINIC | Age: 66
End: 2021-08-30

## 2021-08-30 ENCOUNTER — TELEPHONE (OUTPATIENT)
Dept: INTERNAL MEDICINE | Facility: CLINIC | Age: 66
End: 2021-08-30

## 2021-08-30 VITALS
DIASTOLIC BLOOD PRESSURE: 70 MMHG | HEART RATE: 61 BPM | SYSTOLIC BLOOD PRESSURE: 120 MMHG | HEIGHT: 70 IN | TEMPERATURE: 97.3 F | BODY MASS INDEX: 31.75 KG/M2 | OXYGEN SATURATION: 98 % | WEIGHT: 221.8 LBS

## 2021-08-30 DIAGNOSIS — S01.312D LACERATION OF LEFT EARLOBE, SUBSEQUENT ENCOUNTER: Primary | ICD-10-CM

## 2021-08-30 PROCEDURE — 99213 OFFICE O/P EST LOW 20 MIN: CPT | Performed by: INTERNAL MEDICINE

## 2021-08-30 NOTE — TELEPHONE ENCOUNTER
PATIENT FELL AND SPLIT HIS EAR YESTERDAY AND HAD TO GET 9 STICHES. PATIENT IS ON Eliquis 5 MG tablet tablet AND HIS EAR IS BLEEDING THROUGH THE STICHES. CANDICE IS ASKING IF PATIENT CAN STOP THE BLOOD THINNERS SO SHE CAN GET THE BLEEDING TO STOP. PLEASE RETURN CALL TO [facility]ISE [phone]

## 2021-08-30 NOTE — PROGRESS NOTES
"Subjective  Yehuda Capellan is a 66 y.o. male    HPI coming in for evaluation of left ear wound which he sustained after an accidental fall in the bathroom.  He was evaluated in the emergency room and had stitches placed.  He is on anticoagulant therapy.  He was not advised to discontinue Eliquis in the emergency room.  He is coming in accompanied by his wife was giving us some of the history.  Has had intermittent oozing of the wound site.  Complains of some discomfort there    The following portions of the patient's history were reviewed and updated as appropriate: allergies, current medications, past family history, past medical history, past social history, past surgical history, and problem list.     Review of Systems   Constitutional: Negative.  Negative for activity change, appetite change, fatigue and fever.   HENT: Positive for ear pain and facial swelling. Negative for congestion, ear discharge and trouble swallowing.    Eyes: Negative for photophobia and visual disturbance.   Respiratory: Negative for cough and shortness of breath.    Cardiovascular: Negative for chest pain and palpitations.   Gastrointestinal: Negative for abdominal distention, abdominal pain, constipation, diarrhea, nausea and vomiting.   Endocrine: Negative.    Genitourinary: Negative for dysuria, hematuria and urgency.   Musculoskeletal: Negative for arthralgias, back pain, joint swelling and myalgias.   Skin: Negative for color change and rash.   Allergic/Immunologic: Negative.    Neurological: Negative for dizziness, weakness, light-headedness and headaches.   Hematological: Negative for adenopathy. Does not bruise/bleed easily.   Psychiatric/Behavioral: Negative for agitation, confusion and dysphoric mood. The patient is not nervous/anxious.        Visit Vitals  /70   Pulse 61   Temp 97.3 °F (36.3 °C) (Infrared)   Ht 177.8 cm (70\")   Wt 101 kg (221 lb 12.8 oz)   SpO2 98%   BMI 31.82 kg/m²       Objective  Physical " Exam  Constitutional:       General: He is not in acute distress.     Appearance: He is well-developed.   HENT:      Head:        Comments: Laceration, swelling     Nose: Nose normal.   Eyes:      General: No scleral icterus.     Conjunctiva/sclera: Conjunctivae normal.   Neck:      Thyroid: No thyromegaly.      Trachea: No tracheal deviation.   Cardiovascular:      Rate and Rhythm: Normal rate and regular rhythm.      Heart sounds: No murmur heard.   No friction rub.   Pulmonary:      Effort: No respiratory distress.      Breath sounds: No wheezing or rales.   Abdominal:      General: There is no distension.      Palpations: Abdomen is soft. There is no mass.      Tenderness: There is no abdominal tenderness. There is no guarding.   Musculoskeletal:         General: No deformity. Normal range of motion.   Lymphadenopathy:      Cervical: No cervical adenopathy.   Skin:     General: Skin is warm and dry.      Findings: No erythema or rash.   Neurological:      Mental Status: He is alert and oriented to person, place, and time.      Cranial Nerves: No cranial nerve deficit.      Coordination: Coordination normal.      Deep Tendon Reflexes: Reflexes are normal and symmetric.   Psychiatric:         Behavior: Behavior normal.         Thought Content: Thought content normal.         Judgment: Judgment normal.         Diagnoses and all orders for this visit:    Laceration of left earlobe, subsequent encounter advised to hold aspirin and anticoagulant therapy for 3 days.  Wound inspected.  Antibiotic cream applied along with new dressing.  Bactroban prescribed continue with oral antibiotic therapy prescribed from the emergency room follow-up again in about a week for wound check and suture removal

## 2021-09-09 ENCOUNTER — OFFICE VISIT (OUTPATIENT)
Dept: INTERNAL MEDICINE | Facility: CLINIC | Age: 66
End: 2021-09-09

## 2021-09-09 VITALS
TEMPERATURE: 97.3 F | HEART RATE: 66 BPM | HEIGHT: 70 IN | OXYGEN SATURATION: 98 % | DIASTOLIC BLOOD PRESSURE: 68 MMHG | BODY MASS INDEX: 31.61 KG/M2 | SYSTOLIC BLOOD PRESSURE: 120 MMHG | WEIGHT: 220.8 LBS

## 2021-09-09 DIAGNOSIS — H92.02 LEFT EAR PAIN: Primary | ICD-10-CM

## 2021-09-09 DIAGNOSIS — G25.81 RLS (RESTLESS LEGS SYNDROME): ICD-10-CM

## 2021-09-09 PROCEDURE — 99213 OFFICE O/P EST LOW 20 MIN: CPT | Performed by: INTERNAL MEDICINE

## 2021-09-09 RX ORDER — ROPINIROLE 0.25 MG/1
0.25 TABLET, FILM COATED ORAL NIGHTLY
Qty: 30 TABLET | Refills: 5 | Status: SHIPPED | OUTPATIENT
Start: 2021-09-09 | End: 2022-01-10 | Stop reason: SDUPTHER

## 2021-09-09 NOTE — PROGRESS NOTES
"Subjective  Yehuda Capellan is a 66 y.o. male    HPI coming in for follow-up patient had an accidental fall and laceration over his left ear for which he underwent suturing in the emergency room.  Now coming back for follow-up about 10 days after his injury.  Has had significant improvement in healing at the suture site.  Has not had any drainage.  Denies fever or chills  He has new symptoms of occasional leg discomfort especially when he is trying to sleep.  The symptoms are relieved with walking or movement of his legs.  The following portions of the patient's history were reviewed and updated as appropriate: allergies, current medications, past family history, past medical history, past social history, past surgical history, and problem list.     Review of Systems   Constitutional: Negative.  Negative for activity change, appetite change, fatigue and fever.   HENT: Positive for ear discharge and ear pain. Negative for congestion and trouble swallowing.    Eyes: Negative for photophobia and visual disturbance.   Respiratory: Negative for cough and shortness of breath.    Cardiovascular: Negative for chest pain and palpitations.   Gastrointestinal: Negative for abdominal distention, abdominal pain, constipation, diarrhea, nausea and vomiting.   Endocrine: Negative.    Genitourinary: Negative for dysuria, hematuria and urgency.   Musculoskeletal: Negative for arthralgias, back pain, joint swelling and myalgias.   Skin: Negative for color change and rash.   Allergic/Immunologic: Negative.    Neurological: Negative for dizziness, weakness, light-headedness and headaches.   Hematological: Negative for adenopathy. Does not bruise/bleed easily.   Psychiatric/Behavioral: Positive for sleep disturbance. Negative for agitation, confusion and dysphoric mood. The patient is not nervous/anxious.        Visit Vitals  /68   Pulse 66   Temp 97.3 °F (36.3 °C) (Infrared)   Ht 177.8 cm (70\")   Wt 100 kg (220 lb 12.8 oz) "   SpO2 98%   BMI 31.68 kg/m²       Objective  Physical Exam  Constitutional:       General: He is not in acute distress.     Appearance: He is well-developed.   HENT:      Head:        Comments: Ear wound healing well.  Sutures intact     Nose: Nose normal.   Eyes:      General: No scleral icterus.     Conjunctiva/sclera: Conjunctivae normal.   Neck:      Thyroid: No thyromegaly.      Trachea: No tracheal deviation.   Cardiovascular:      Rate and Rhythm: Normal rate and regular rhythm.      Heart sounds: No murmur heard.   No friction rub.   Pulmonary:      Effort: No respiratory distress.      Breath sounds: No wheezing or rales.   Abdominal:      General: There is no distension.      Palpations: Abdomen is soft. There is no mass.      Tenderness: There is no abdominal tenderness. There is no guarding.   Musculoskeletal:         General: No deformity. Normal range of motion.   Lymphadenopathy:      Cervical: No cervical adenopathy.   Skin:     General: Skin is warm and dry.      Findings: No erythema or rash.   Neurological:      Mental Status: He is alert and oriented to person, place, and time.      Cranial Nerves: No cranial nerve deficit.      Coordination: Coordination normal.      Deep Tendon Reflexes: Reflexes are normal and symmetric.   Psychiatric:         Behavior: Behavior normal.         Thought Content: Thought content normal.         Judgment: Judgment normal.         Diagnoses and all orders for this visit:    Left ear pain sutures removed area clean.  Discussed wound care instructions    RLS (restless legs syndrome) trial of ropinirole 0.25 at nighttime

## 2021-11-15 ENCOUNTER — OFFICE VISIT (OUTPATIENT)
Dept: ENDOCRINOLOGY | Facility: CLINIC | Age: 66
End: 2021-11-15

## 2021-11-15 VITALS
OXYGEN SATURATION: 98 % | HEART RATE: 61 BPM | SYSTOLIC BLOOD PRESSURE: 148 MMHG | HEIGHT: 70 IN | BODY MASS INDEX: 30.49 KG/M2 | DIASTOLIC BLOOD PRESSURE: 88 MMHG | WEIGHT: 213 LBS

## 2021-11-15 DIAGNOSIS — E04.2 MULTINODULAR GOITER (NONTOXIC): Primary | ICD-10-CM

## 2021-11-15 PROCEDURE — 76536 US EXAM OF HEAD AND NECK: CPT | Performed by: INTERNAL MEDICINE

## 2021-11-15 PROCEDURE — 99204 OFFICE O/P NEW MOD 45 MIN: CPT | Performed by: INTERNAL MEDICINE

## 2021-11-19 ENCOUNTER — OFFICE VISIT (OUTPATIENT)
Dept: ENDOCRINOLOGY | Facility: CLINIC | Age: 66
End: 2021-11-19

## 2021-11-19 DIAGNOSIS — E04.2 MULTINODULAR GOITER (NONTOXIC): Primary | ICD-10-CM

## 2021-11-19 PROCEDURE — 10006 FNA BX W/US GDN EA ADDL: CPT | Performed by: INTERNAL MEDICINE

## 2021-11-19 PROCEDURE — 10005 FNA BX W/US GDN 1ST LES: CPT | Performed by: INTERNAL MEDICINE

## 2021-11-19 NOTE — PROGRESS NOTES
"Thyroid Ultrasound-Guided FNA Report  [place], [place]    Date: 11/19/2021  Indication: right and left thyroid lobe nodules     After informed consent, the neck was prepped in sterile fashion. Real time high resolution ultrasound imaging demonstrated:    A) Right Mid Lobe: a 3.76(L) x 3.23(AP) x 3.51(T) cm isoechoic, solid nodule. The nodule had a smooth margin, peripheral vascularity, and no microcalcifications.    B) Left Mid Lobe: a 4.90(L) x 3.03(AP) x 3.40(T) cm  isoechoic, solid nodule. The nodule had a smooth margin, peripheral vascularity, and no microcalcifications.                              .  With ultrasound guidance of needle localization, four aspirates were obtained with sterile 27 g. Needles from each nodule. Eight slides were prepared with both air drying and immediate cytology fixative for each nodule.  A single container with 30 ml of cytology fixative was also used to collect needle and syringe washings from each nodule.       The patient tolerated the procedure without difficulty or complications.     ADDENDUM: Cytopathology (Reported 11/22/2021, [facility], [facility])  \"A.  Right thyroid nodule, FNA: Negative for malignancy.  Consistent with a benign, goitrous, follicular nodule  B.  Left thyroid nodule, FNA: Negative for malignancy.  Consistent with a benign colloid nodule/cyst    Microscopic Description:  A.  The right thyroid aspirate shows abundant colloid, variably sized follicular groups of macrophages consistent with a benign goitrous follicular nodule.   B.  The left thyroid aspirate shows abundant colloid and macrophages with rare scattered follicular groups consistent with a benign colloid nodule/cyst.\"    Signed: Jennifer Lawler MD    "

## 2021-11-27 PROBLEM — E04.2 MULTINODULAR GOITER (NONTOXIC): Status: ACTIVE | Noted: 2021-11-27

## 2021-11-27 NOTE — PROGRESS NOTES
"Chief Complaint   Patient presents with   • Thyroid Nodule     New Consult SALUD Sanchez       HPI:   Yehuda Capellan is a 66 y.o.male referred by Jeff Sanchez MD for further evaluation of pt's nontoxic MNG. His history is as follows:    1) non-toxic, non-obstructive MNG:  - Pt reports he noticed a palpable mass in his neck in 2020. He later reported his neck mass to his PCP in 04/2021. This was further evaluated with a Neck US completed at  Radiology:    (04/21/2021) Neck US: FINDINGS: The right lobe of the thyroid measures 6.0 x 3.8 x 4.7 cm. It is normal in echogenicity. There is a 3.5 cm well-circumscribed oval isoechoic nodule in the right lobe of the thyroid gland. Left lobe of the thyroid measures 6.6 x 4.4 x 3.6 cm. It is normal in echogenicity. There is a 3.7 cm well-circumscribed oval isoechoic nodule  in the left lobe of the thyroid gland. IMPRESSION: Solid nodules within both lobes of the thyroid gland which given the size of the nodules may fine-needle aspiration should be considered.\"    Labs: (11/2020) TSH 1.290    On further review, pt denies a pattern of symptoms c/w hyper- or hypothyroidism or obstructive goiter.     FMH: no known thyroid cancer, no known thyroid disease  PMH: no h/o irradiation of head, neck, or chest. Pt on ASA + Eliquis for Afib      Review of Systems   Constitutional: Negative.    HENT: Negative.    Eyes: Negative.    Respiratory: Negative.    Cardiovascular: Negative.    Gastrointestinal: Negative.    Endocrine: Negative.    Genitourinary: Negative.    Musculoskeletal: Positive for arthralgias and myalgias.   Skin: Negative.    Allergic/Immunologic: Negative.    Neurological: Negative.    Hematological: Negative.    Psychiatric/Behavioral: Negative.      Past Medical History:   Diagnosis Date   • Atrial fibrillation (HCC)    • Elevated glucose 09/29/2017    A1C 6.7   • Hyperlipidemia      family history includes Cancer in his father.  Past Surgical History:   Procedure " Laterality Date   • HAND SURGERY      LEFT PINKY SEWN BACK ON      Social History     Tobacco Use   • Smoking status: Former Smoker     Packs/day: 3.00     Years: 20.00     Pack years: 60.00     Types: Cigarettes     Quit date:      Years since quittin.9   • Smokeless tobacco: Never Used   Substance Use Topics   • Alcohol use: No   • Drug use: Never     Outpatient Medications Prior to Visit   Medication Sig Dispense Refill   • Alcohol Swabs pads Use tid e11.9 300 each 3   • aspirin 81 MG tablet Take  by mouth Daily.     • atorvastatin (LIPITOR) 20 MG tablet Take 1 tablet by mouth Every Night. 90 tablet 3   • Blood Glucose Calibration (Accu-Chek Darcy) solution Use as directed. E11.9 1 each 3   • cetirizine (zyrTEC) 10 MG tablet      • Eliquis 5 MG tablet tablet Take 1 tablet by mouth Every 12 (Twelve) Hours. 180 tablet 3   • glimepiride (Amaryl) 2 MG tablet Take 1 tablet by mouth Every Morning Before Breakfast. 90 tablet 3   • glucose blood test strip Check glucose daily for DM. E11.65 100 each 3   • glucose monitor monitoring kit ACCU-CHECK DARCY PLUS METER as requested. 1 each 1   • Lancets Misc. misc TEST TWICE A DAY FOR DM. E11.9. patient requests acc-check softclick 200 each 3   • losartan-hydrochlorothiazide (HYZAAR) 100-12.5 MG per tablet Take 1 tablet by mouth Daily. 90 tablet 3   • metFORMIN (Glucophage) 1000 MG tablet Take 1 tablet by mouth 2 (Two) Times a Day With Meals. 180 tablet 3   • metoprolol tartrate (LOPRESSOR) 50 MG tablet Take 1 tablet by mouth 2 (Two) Times a Day. 90 tablet 3   • mupirocin (Bactroban) 2 % ointment Apply  topically to the appropriate area as directed 3 (Three) Times a Day. 15 g 0   • rOPINIRole (Requip) 0.25 MG tablet Take 1 tablet by mouth Every Night. Take 1 hour before bedtime. 30 tablet 5   • traZODone (DESYREL) 50 MG tablet TAKE 1 TABLET BY MOUTH EVERY NIGHT. 90 tablet 3     No facility-administered medications prior to visit.     Allergies   Allergen Reactions   •  "Sulfa Antibiotics Unknown - Low Severity     Patient states he had allergic reaction to sulfa drugs when he was a child.       /88   Pulse 61   Ht 177.8 cm (70\")   Wt 96.6 kg (213 lb)   SpO2 98%   BMI 30.56 kg/m²   Physical Exam  Vitals reviewed.   Constitutional:       General: He is not in acute distress.     Appearance: He is well-developed. He is not diaphoretic.   HENT:      Head: Normocephalic.   Eyes:      Conjunctiva/sclera: Conjunctivae normal.      Pupils: Pupils are equal, round, and reactive to light.   Neck:      Thyroid: No thyromegaly.      Trachea: No tracheal deviation.      Comments: Enlarged thyroid lobes. Could not palpate distinct nodule in either lobe. 1 cm palpable left sided isthmus nodule,    Cardiovascular:      Rate and Rhythm: Normal rate and regular rhythm.      Heart sounds: Normal heart sounds. No murmur heard.      Pulmonary:      Effort: Pulmonary effort is normal. No respiratory distress.      Breath sounds: Normal breath sounds.   Musculoskeletal:      Cervical back: Neck supple.   Lymphadenopathy:      Cervical: No cervical adenopathy.   Skin:     General: Skin is warm and dry.      Findings: No erythema.   Neurological:      Mental Status: He is alert and oriented to person, place, and time.      Cranial Nerves: No cranial nerve deficit.   Psychiatric:         Behavior: Behavior normal.         LABS/IMAGING: outside records reviewed and summarized in [facility] [place]  Lab Results   Component Value Date    TSH 1.290 11/19/2020       PROCEDURES (in office):  Thyroid Ultrasound Report  [facility]  [place], [place]    Date: 11/15/2021  Indication: multinodular goiter  Comparison Imaging:  radiology Neck US (04/2021)  Clinical History: 66 y.o. male with h/o MNG on physical exam and outside imaging. H/O normal thyroid function    Real time high resolution imaging of the thyroid gland was performed in transverse and longitudinal planes.  The right lobe measured 7.21 cm in " Length x 3.45 cm in AP diameter x 3.82 cm in TV dimension.  The isthmus measured 0.92 cm in thickness.  The left thyroid lobe measured 6.84 cm in length x 3.68 cm in AP diameter x 4.03 cm in TV dimension.    The thyroid gland appeared overall isoechoic with diffusely heterogeneous echotexture.    In the right mid lobe, a 3.76(L) x 3.23(AP) x 3.51(T) cm isoechoic, solid nodule was identified. The nodule had a smooth margin, peripheral vascularity, and no microcalcifications.     In the left side of the isthmus, a 2.09(L) x 1.13(AP) x 1.66(T) cm slightly hypoechoic, solid nodule was identified. The nodule had a smooth margin, peripheral vascularity, and no microcalcifications.     In the left mid lobe, a 4.90(L) x 3.03(AP) x 3.40(T) cm isoechoic, solid nodule was identified. The nodule margin was not well defined. The nodule had minimal peripheral vascularity, and no microcalcifications.     No pathologic lymph nodes were seen.     IMPRESSION:   1) The thyroid gland was enlarged in size by measured dimensions. Findings were consistent with a multinodular goiter.   2) In the right mid lobe, a 3.76(L) x 3.23(AP) x 3.51(T) cm isoechoic, solid nodule was identified. The nodule had a smooth margin, peripheral vascularity, and no microcalcifications.   3) In the left side of the isthmus, a 2.09(L) x 1.13(AP) x 1.66(T) cm slightly hypoechoic, solid nodule was identified. The nodule had a smooth margin, peripheral vascularity, and no microcalcifications.   4) In the left mid lobe, a 4.90(L) x 3.03(AP) x 3.40(T) cm isoechoic, solid nodule was identified. The nodule margin was not well defined. The nodule had minimal peripheral vascularity, and no microcalcifications.     RECOMMENDATIONS: Based on size and US characteristics, US-Guided FNA of the large nodule in each lobe was recommended to the patient.     Signed: Jennifer Lawler MD      ASSESSMENT/PLAN:    1) non-toxic, non-obstructive MNG: Thyroid US completed in clinic today  "showed:  - The thyroid gland was enlarged in size by measured dimensions. Findings were consistent with a multinodular goiter.   - In the right mid lobe, a 3.76(L) x 3.23(AP) x 3.51(T) cm isoechoic, solid nodule was identified. The nodule had a smooth margin, peripheral vascularity, and no microcalcifications.   - In the left side of the isthmus, a 2.09(L) x 1.13(AP) x 1.66(T) cm slightly hypoechoic, solid nodule was identified. The nodule had a smooth margin, peripheral vascularity, and no microcalcifications.   - In the left mid lobe, a 4.90(L) x 3.03(AP) x 3.40(T) cm isoechoic, solid nodule was identified. The nodule margin was not well defined. The nodule had minimal peripheral vascularity, and no microcalcifications.     RECOMMENDATIONS: Based on size and US characteristics, US-Guided FNA of the large nodule in each lobe was recommended to the patient.     Pt advised to hold Eliquis for 3 days and RTC on 11/19/2021 for US-Guided FNA.    ADDENDUM: US-Guided FNA completed 11/19/2021 w/o difficulty. See separate report.  Cytopathology (Reported 11/22/2021, [facility], [facility])  \"A.  Right thyroid nodule, FNA: Negative for malignancy.  Consistent with a benign, goitrous, follicular nodule  B.  Left thyroid nodule, FNA: Negative for malignancy.  Consistent with a benign colloid nodule/cyst    Microscopic Description:  A.  The right thyroid aspirate shows abundant colloid, variably sized follicular groups of macrophages consistent with a benign goitrous follicular nodule.   B.  The left thyroid aspirate shows abundant colloid and macrophages with rare scattered follicular groups consistent with a benign colloid nodule/cyst.\"    Counseling was given to patient for the following topics:  impressions, clinical significance of thyroid nodules, risk factors for thyroid cancer, indications for FNA, and reviewing US images with patient. Total face to face time of the encounter was 45 minutes and " 30 minutes was spent counseling. 15 min spent completing thyroid US    Pt schedule to RTC in 1 year for repeat imaging.    Signed: Jennifer Lawler MD

## 2021-11-27 NOTE — PROGRESS NOTES
Thyroid Ultrasound Report  [facility]  [place], [place]    Date: 11/15/2021  Indication: multinodular goiter  Comparison Imaging:  radiology Neck US (04/2021)  Clinical History: 66 y.o. male with h/o MNG on physical exam and outside imaging. H/O normal thyroid function    Real time high resolution imaging of the thyroid gland was performed in transverse and longitudinal planes.  The right lobe measured 7.21 cm in Length x 3.45 cm in AP diameter x 3.82 cm in TV dimension.  The isthmus measured 0.92 cm in thickness.  The left thyroid lobe measured 6.84 cm in length x 3.68 cm in AP diameter x 4.03 cm in TV dimension.    The thyroid gland appeared overall isoechoic with diffusely heterogeneous echotexture.    In the right mid lobe, a 3.76(L) x 3.23(AP) x 3.51(T) cm isoechoic, solid nodule was identified. The nodule had a smooth margin, peripheral vascularity, and no microcalcifications.     In the left side of the isthmus, a 2.09(L) x 1.13(AP) x 1.66(T) cm slightly hypoechoic, solid nodule was identified. The nodule had a smooth margin, peripheral vascularity, and no microcalcifications.     In the left mid lobe, a 4.90(L) x 3.03(AP) x 3.40(T) cm isoechoic, solid nodule was identified. The nodule margin was not well defined. The nodule had minimal peripheral vascularity, and no microcalcifications.     No pathologic lymph nodes were seen.     IMPRESSION:   1) The thyroid gland was enlarged in size by measured dimensions. Findings were consistent with a multinodular goiter.   2) In the right mid lobe, a 3.76(L) x 3.23(AP) x 3.51(T) cm isoechoic, solid nodule was identified. The nodule had a smooth margin, peripheral vascularity, and no microcalcifications.   3) In the left side of the isthmus, a 2.09(L) x 1.13(AP) x 1.66(T) cm slightly hypoechoic, solid nodule was identified. The nodule had a smooth margin, peripheral vascularity, and no microcalcifications.   4) In the left mid lobe, a 4.90(L) x  3.03(AP) x 3.40(T) cm isoechoic, solid nodule was identified. The nodule margin was not well defined. The nodule had minimal peripheral vascularity, and no microcalcifications.     RECOMMENDATIONS: Based on size and US characteristics, US-Guided FNA of the large nodule in each lobe was recommended to the patient.     Signed: Jennifer Lawler MD

## 2022-01-10 ENCOUNTER — OFFICE VISIT (OUTPATIENT)
Dept: INTERNAL MEDICINE | Facility: CLINIC | Age: 67
End: 2022-01-10

## 2022-01-10 VITALS
SYSTOLIC BLOOD PRESSURE: 110 MMHG | OXYGEN SATURATION: 98 % | TEMPERATURE: 96.9 F | BODY MASS INDEX: 31.26 KG/M2 | WEIGHT: 218.4 LBS | DIASTOLIC BLOOD PRESSURE: 68 MMHG | HEIGHT: 70 IN | HEART RATE: 77 BPM

## 2022-01-10 DIAGNOSIS — Z87.891 PERSONAL HISTORY OF TOBACCO USE, PRESENTING HAZARDS TO HEALTH: Primary | ICD-10-CM

## 2022-01-10 DIAGNOSIS — Z87.891 PERSONAL HISTORY OF TOBACCO USE, PRESENTING HAZARDS TO HEALTH: ICD-10-CM

## 2022-01-10 DIAGNOSIS — I48.20 ATRIAL FIBRILLATION, CHRONIC: ICD-10-CM

## 2022-01-10 DIAGNOSIS — I10 ESSENTIAL HYPERTENSION: Primary | ICD-10-CM

## 2022-01-10 DIAGNOSIS — E11.65 UNCONTROLLED TYPE 2 DIABETES MELLITUS WITH HYPERGLYCEMIA, WITHOUT LONG-TERM CURRENT USE OF INSULIN: ICD-10-CM

## 2022-01-10 LAB
EXPIRATION DATE: ABNORMAL
HBA1C MFR BLD: 6.4 %
Lab: ABNORMAL

## 2022-01-10 PROCEDURE — 99214 OFFICE O/P EST MOD 30 MIN: CPT | Performed by: INTERNAL MEDICINE

## 2022-01-10 PROCEDURE — 83036 HEMOGLOBIN GLYCOSYLATED A1C: CPT | Performed by: INTERNAL MEDICINE

## 2022-01-10 RX ORDER — METOPROLOL TARTRATE 50 MG/1
50 TABLET, FILM COATED ORAL 2 TIMES DAILY
Qty: 90 TABLET | Refills: 3 | Status: SHIPPED | OUTPATIENT
Start: 2022-01-10 | End: 2022-09-13

## 2022-01-10 RX ORDER — GLIMEPIRIDE 2 MG/1
2 TABLET ORAL
Qty: 90 TABLET | Refills: 3 | Status: SHIPPED | OUTPATIENT
Start: 2022-01-10 | End: 2022-12-27

## 2022-01-10 RX ORDER — ROPINIROLE 0.25 MG/1
0.25 TABLET, FILM COATED ORAL NIGHTLY
Qty: 30 TABLET | Refills: 5 | Status: SHIPPED | OUTPATIENT
Start: 2022-01-10 | End: 2022-05-06

## 2022-01-10 RX ORDER — LOSARTAN POTASSIUM AND HYDROCHLOROTHIAZIDE 12.5; 1 MG/1; MG/1
1 TABLET ORAL DAILY
Qty: 90 TABLET | Refills: 3 | Status: SHIPPED | OUTPATIENT
Start: 2022-01-10 | End: 2022-12-27

## 2022-01-10 RX ORDER — BLOOD PRESSURE TEST KIT
KIT MISCELLANEOUS
Qty: 300 EACH | Refills: 3 | Status: SHIPPED | OUTPATIENT
Start: 2022-01-10 | End: 2022-12-27

## 2022-01-10 RX ORDER — TRAZODONE HYDROCHLORIDE 50 MG/1
50 TABLET ORAL NIGHTLY
Qty: 90 TABLET | Refills: 3 | Status: SHIPPED | OUTPATIENT
Start: 2022-01-10 | End: 2022-08-15 | Stop reason: SDUPTHER

## 2022-01-10 RX ORDER — APIXABAN 5 MG/1
5 TABLET, FILM COATED ORAL EVERY 12 HOURS SCHEDULED
Qty: 180 TABLET | Refills: 3 | Status: SHIPPED | OUTPATIENT
Start: 2022-01-10 | End: 2022-05-06 | Stop reason: SDUPTHER

## 2022-01-10 RX ORDER — ATORVASTATIN CALCIUM 20 MG/1
20 TABLET, FILM COATED ORAL NIGHTLY
Qty: 90 TABLET | Refills: 3 | Status: SHIPPED | OUTPATIENT
Start: 2022-01-10 | End: 2022-11-15 | Stop reason: SDUPTHER

## 2022-01-10 NOTE — PROGRESS NOTES
"Subjective  Senate MARILYNN Capellan is a 66 y.o. male    HPI coming in for follow-up patient with diabetes history of DJD, hypertension.  Reasonably compliant with diet but not with his exercise.  Has had symptoms suggestive of RLS for which she has been on ropinirole.  No alcohol or tobacco use    The following portions of the patient's history were reviewed and updated as appropriate: allergies, current medications, past family history, past medical history, past social history, past surgical history, and problem list.     Review of Systems   Constitutional: Positive for fatigue. Negative for activity change, appetite change and fever.   HENT: Negative for congestion, ear discharge, ear pain and trouble swallowing.    Eyes: Negative for photophobia and visual disturbance.   Respiratory: Negative for cough and shortness of breath.    Cardiovascular: Negative for chest pain and palpitations.   Gastrointestinal: Negative for abdominal distention, abdominal pain, constipation, diarrhea, nausea and vomiting.   Endocrine: Negative.    Genitourinary: Negative for dysuria, hematuria and urgency.   Musculoskeletal: Positive for arthralgias. Negative for back pain, joint swelling and myalgias.   Skin: Negative for color change and rash.   Allergic/Immunologic: Negative.    Neurological: Negative for dizziness, weakness, light-headedness and headaches.   Hematological: Negative for adenopathy. Does not bruise/bleed easily.   Psychiatric/Behavioral: Negative for agitation, confusion and dysphoric mood. The patient is not nervous/anxious.        Visit Vitals  /68   Pulse 77   Temp 96.9 °F (36.1 °C) (Infrared)   Ht 177.8 cm (70\")   Wt 99.1 kg (218 lb 6.4 oz)   SpO2 98%   BMI 31.34 kg/m²       Objective  Physical Exam  Constitutional:       General: He is not in acute distress.     Appearance: He is well-developed.   HENT:      Nose: Nose normal.   Eyes:      General: No scleral icterus.     Conjunctiva/sclera: Conjunctivae " normal.   Neck:      Thyroid: No thyromegaly.      Trachea: No tracheal deviation.   Cardiovascular:      Rate and Rhythm: Normal rate and regular rhythm.      Pulses:           Dorsalis pedis pulses are 1+ on the right side and 1+ on the left side.      Heart sounds: No murmur heard.  No friction rub.   Pulmonary:      Effort: No respiratory distress.      Breath sounds: No wheezing or rales.   Abdominal:      General: There is no distension.      Palpations: Abdomen is soft. There is no mass.      Tenderness: There is no abdominal tenderness. There is no guarding.   Musculoskeletal:         General: Deformity present. Normal range of motion.   Feet:      Right foot:      Protective Sensation: 1 site tested. 1 site sensed.     Skin integrity: Skin integrity normal.      Left foot:      Protective Sensation: 1 site tested. 1 site sensed.     Skin integrity: Skin integrity normal.      Comments: Suspected onychomycosis. Hammer toes on rt. side  Lymphadenopathy:      Cervical: No cervical adenopathy.   Skin:     General: Skin is warm and dry.      Findings: No erythema or rash.   Neurological:      Mental Status: He is alert and oriented to person, place, and time.      Cranial Nerves: No cranial nerve deficit.      Sensory: Sensory deficit present.      Coordination: Coordination normal.      Deep Tendon Reflexes: Reflexes are normal and symmetric.   Psychiatric:         Behavior: Behavior normal.         Thought Content: Thought content normal.         Judgment: Judgment normal.         Diagnoses and all orders for this visit:    Essential hypertension stable with current meds and low-salt diet    Uncontrolled type 2 diabetes mellitus with hyperglycemia, without long-term current use of insulin (HCC) dietary counseling given again continue with current meds discussed walking regimen and exercise program  -     Lancets Misc. misc; TEST TWICE A DAY FOR DM. E11.9. patient requests acc-check softclick    Atrial  fibrillation, chronic (HCC) stable rate control okay continue with metoprolol, Eliquis    Other orders  -     atorvastatin (LIPITOR) 20 MG tablet; Take 1 tablet by mouth Every Night.  -     Eliquis 5 MG tablet tablet; Take 1 tablet by mouth Every 12 (Twelve) Hours.  -     glimepiride (Amaryl) 2 MG tablet; Take 1 tablet by mouth Every Morning Before Breakfast.  -     losartan-hydrochlorothiazide (HYZAAR) 100-12.5 MG per tablet; Take 1 tablet by mouth Daily.  -     metFORMIN (Glucophage) 1000 MG tablet; Take 1 tablet by mouth 2 (Two) Times a Day With Meals.  -     metoprolol tartrate (LOPRESSOR) 50 MG tablet; Take 1 tablet by mouth 2 (Two) Times a Day.  -     rOPINIRole (Requip) 0.25 MG tablet; Take 1 tablet by mouth Every Night. Take 1 hour before bedtime.  -     traZODone (DESYREL) 50 MG tablet; Take 1 tablet by mouth Every Night.  -     glucose blood test strip; Check glucose daily for DM. E11.65  -     Alcohol Swabs pads; Use tid e11.9

## 2022-01-13 ENCOUNTER — TELEPHONE (OUTPATIENT)
Dept: INTERNAL MEDICINE | Facility: CLINIC | Age: 67
End: 2022-01-13

## 2022-01-13 NOTE — TELEPHONE ENCOUNTER
DELETE AFTER REVIEWING: Telephone encounter to be sent to the clinical pool.    Pharmacy Name:  [facility]    Pharmacy representative name: [email or web address]    Pharmacy representative phone number: [phone]  Reference number [id number]    What medication are you calling in regards to:glucose blood test strip    What question does the pharmacy have: calling to verify instructions, on how many times a day patient should check their blood sugar.  The lancets indicate twice a day and they need to match up with test strips    Who is the provider that prescribed the medication: Dr. Sanchez    Additional notes:

## 2022-01-17 ENCOUNTER — APPOINTMENT (OUTPATIENT)
Dept: ULTRASOUND IMAGING | Facility: HOSPITAL | Age: 67
End: 2022-01-17

## 2022-01-24 ENCOUNTER — HOSPITAL ENCOUNTER (OUTPATIENT)
Dept: ULTRASOUND IMAGING | Facility: HOSPITAL | Age: 67
Discharge: HOME | End: 2022-01-24
Admitting: INTERNAL MEDICINE

## 2022-01-24 DIAGNOSIS — Z87.891 PERSONAL HISTORY OF TOBACCO USE, PRESENTING HAZARDS TO HEALTH: ICD-10-CM

## 2022-01-24 PROCEDURE — 76706 US ABDL AORTA SCREEN AAA: CPT

## 2022-05-06 ENCOUNTER — OFFICE VISIT (OUTPATIENT)
Dept: INTERNAL MEDICINE | Facility: CLINIC | Age: 67
End: 2022-05-06

## 2022-05-06 VITALS
HEIGHT: 70 IN | WEIGHT: 216.8 LBS | BODY MASS INDEX: 31.04 KG/M2 | SYSTOLIC BLOOD PRESSURE: 118 MMHG | TEMPERATURE: 97.1 F | HEART RATE: 64 BPM | OXYGEN SATURATION: 97 % | DIASTOLIC BLOOD PRESSURE: 74 MMHG

## 2022-05-06 DIAGNOSIS — I10 ESSENTIAL HYPERTENSION: ICD-10-CM

## 2022-05-06 DIAGNOSIS — G62.9 PERIPHERAL POLYNEUROPATHY: ICD-10-CM

## 2022-05-06 DIAGNOSIS — E11.65 TYPE 2 DIABETES MELLITUS WITH HYPERGLYCEMIA, WITHOUT LONG-TERM CURRENT USE OF INSULIN: Primary | ICD-10-CM

## 2022-05-06 DIAGNOSIS — I48.20 ATRIAL FIBRILLATION, CHRONIC: ICD-10-CM

## 2022-05-06 LAB
EXPIRATION DATE: NORMAL
HBA1C MFR BLD: 6.3 %
Lab: NORMAL

## 2022-05-06 PROCEDURE — 99214 OFFICE O/P EST MOD 30 MIN: CPT | Performed by: FAMILY MEDICINE

## 2022-05-06 PROCEDURE — 83036 HEMOGLOBIN GLYCOSYLATED A1C: CPT | Performed by: FAMILY MEDICINE

## 2022-05-06 RX ORDER — GABAPENTIN 300 MG/1
300 CAPSULE ORAL 3 TIMES DAILY PRN
Qty: 270 CAPSULE | Refills: 1 | Status: SHIPPED | OUTPATIENT
Start: 2022-05-06 | End: 2022-08-15 | Stop reason: SDUPTHER

## 2022-05-06 NOTE — PROGRESS NOTES
05/06/2022      Assessment/Plan   Problem List Items Addressed This Visit        Cardiac and Vasculature    Atrial fibrillation, chronic (HCC)    Relevant Medications    apixaban (Eliquis) 5 MG tablet tablet    Other Relevant Orders    Ambulatory Referral to Cardiology    Essential hypertension    Current Assessment & Plan     Hypertension is improving with treatment.  Continue current treatment regimen.  Blood pressure will be reassessed at the next regular appointment.              Endocrine and Metabolic    Type 2 diabetes mellitus with hyperglycemia, without long-term current use of insulin (HCC) - Primary    Current Assessment & Plan     Diabetes is improving with treatment.   does not have to check fsbs every day (can check as needed); a1c every 3 months; encouraged b12 supplement as metformin decreases absorption of this vitamin (sublingual)  Diabetes will be reassessed in 3 months.           Relevant Orders    POC Glycosylated Hemoglobin (Hb A1C) (Completed)      Other Visit Diagnoses     Peripheral polyneuropathy        Relevant Medications    gabapentin (NEURONTIN) 300 MG capsule        Plan to do labs next visit (fasting if possible). Take oral/sublingual vitamin B12 supplement and we'll check level next labs. If still low will do injections.    Trial of gabapentin AS NEEDED for his pain. ROBERTO reviewed and appropriate.        Return in about 3 months (around 8/6/2022) for Medicare Wellness.      Subjective    Yehuda Capellan is a 66 y.o. male here for:  Chief Complaint   Patient presents with   • Arthritis   • Hypertension       History per MA reviewed.    Increased pain affecting sleep. Has numbness/tingling and sciatic pain at times, comes and goes.     On metformin, treated for diabetes mellitus. Checks sugars every morning. Highest has been approx 160 but usually lower than that.     Goes to endocrinology but not for diabetes mellitus,followed for thyroid nodule    Ropinirole on med list bu the  "never took. Asks to take off.    Was going to [facility] for cardiology. Feels Afib has been more problematic, he is on Eliquis. Some chest pain.    Reports taking vitamin B12 shots in past and felt better when he did those         The following portions of the patient's history were reviewed and updated as appropriate: allergies, current medications, past family history, past medical history, past social history, past surgical history and problem list.    Review of Systems   Musculoskeletal: Positive for arthralgias and gait problem.   Psychiatric/Behavioral: Positive for sleep disturbance.         Objective   Visit Vitals  /74   Pulse 64   Temp 97.1 °F (36.2 °C)   Ht 177.8 cm (70\")   Wt 98.3 kg (216 lb 12.8 oz)   SpO2 97%   BMI 31.11 kg/m²       Physical Exam  Vitals and nursing note reviewed.   Constitutional:       General: He is not in acute distress.     Appearance: Normal appearance. He is well-developed and well-groomed. He is not ill-appearing, toxic-appearing or diaphoretic.      Interventions: Face mask in place.   HENT:      Head: Normocephalic and atraumatic.      Right Ear: Decreased hearing noted.      Left Ear: Decreased hearing noted.   Eyes:      General: Lids are normal. No scleral icterus.        Right eye: No discharge.         Left eye: No discharge.      Extraocular Movements: Extraocular movements intact.   Cardiovascular:      Rate and Rhythm: Normal rate and regular rhythm.   Pulmonary:      Effort: Pulmonary effort is normal.   Musculoskeletal:      Cervical back: Neck supple.   Skin:     Coloration: Skin is not jaundiced or pale.   Neurological:      General: No focal deficit present.      Mental Status: He is alert and oriented to person, place, and time.      Gait: Gait abnormal.   Psychiatric:         Attention and Perception: Attention and perception normal.         Mood and Affect: Mood and affect normal.         Speech: Speech normal.         Behavior: Behavior normal. Behavior is " cooperative.         Thought Content: Thought content normal.         Cognition and Memory: Cognition and memory normal.         Judgment: Judgment normal.           For medical decision making review of the following was required:    Lab Results   Component Value Date    HGBA1C 6.3 05/06/2022    HGBA1C 6.4 01/10/2022    HGBA1C 6.20 (H) 07/09/2021     Lab Results   Component Value Date    [id number] 11/19/2020        Cindy Zhang MD

## 2022-05-06 NOTE — ASSESSMENT & PLAN NOTE
Diabetes is improving with treatment.   does not have to check fsbs every day (can check as needed); a1c every 3 months; encouraged b12 supplement as metformin decreases absorption of this vitamin (sublingual)  Diabetes will be reassessed in 3 months.

## 2022-06-20 ENCOUNTER — OFFICE VISIT (OUTPATIENT)
Dept: CARDIOLOGY | Facility: CLINIC | Age: 67
End: 2022-06-20

## 2022-06-20 ENCOUNTER — PATIENT ROUNDING (BHMG ONLY) (OUTPATIENT)
Dept: CARDIOLOGY | Facility: CLINIC | Age: 67
End: 2022-06-20

## 2022-06-20 VITALS
WEIGHT: 220 LBS | OXYGEN SATURATION: 99 % | DIASTOLIC BLOOD PRESSURE: 76 MMHG | RESPIRATION RATE: 18 BRPM | SYSTOLIC BLOOD PRESSURE: 108 MMHG | BODY MASS INDEX: 31.5 KG/M2 | HEART RATE: 56 BPM | HEIGHT: 70 IN

## 2022-06-20 DIAGNOSIS — I48.21 ATRIAL FIBRILLATION, PERMANENT: Primary | ICD-10-CM

## 2022-06-20 DIAGNOSIS — E11.65 TYPE 2 DIABETES MELLITUS WITH HYPERGLYCEMIA, WITHOUT LONG-TERM CURRENT USE OF INSULIN: ICD-10-CM

## 2022-06-20 DIAGNOSIS — I34.0 NONRHEUMATIC MITRAL VALVE REGURGITATION: ICD-10-CM

## 2022-06-20 DIAGNOSIS — E78.00 PURE HYPERCHOLESTEROLEMIA: ICD-10-CM

## 2022-06-20 DIAGNOSIS — I10 PRIMARY HYPERTENSION: ICD-10-CM

## 2022-06-20 PROCEDURE — 93000 ELECTROCARDIOGRAM COMPLETE: CPT | Performed by: INTERNAL MEDICINE

## 2022-06-20 PROCEDURE — 99204 OFFICE O/P NEW MOD 45 MIN: CPT | Performed by: INTERNAL MEDICINE

## 2022-06-20 RX ORDER — MULTIPLE VITAMINS W/ MINERALS TAB 9MG-400MCG
1 TAB ORAL DAILY
COMMUNITY

## 2022-06-20 RX ORDER — MAGNESIUM 200 MG
TABLET ORAL
COMMUNITY

## 2022-06-20 RX ORDER — LANCETS
EACH MISCELLANEOUS
COMMUNITY
Start: 2022-05-30 | End: 2022-12-27

## 2022-06-20 NOTE — PROGRESS NOTES
"     [facility] Cardiology OP Consult Note    Yehuda Capellan  [id number]  2022    Referred By: Cindy Zhang MD    Chief Complaint: Atrial fibrillation    History of Present Illness:   Mr. Yehuda Capellan is a 67 y.o. male who presents to the Cardiology Clinic for further management of atrial fibrillation.  The patient has a past medical history significant for type 2 diabetes mellitus, hypertension, and hyperlipidemia.  He has a past cardiac history significant for permanent atrial fibrillation.  He is chronically anticoagulated with Eliquis for CVA prophylaxis.  He also has a history of moderate mitral regurgitation based on echocardiogram in .  He presents to the cardiology clinic today to reestablish care.  In terms of his atrial fibrillation, the patient reports he has been out of rhythm for \"years.\"  He has not had any difficulty with palpitations or symptoms related to his atrial fibrillation in the past.  He continues to tolerate Eliquis without significant bleeding or bruising.  He is active at home, without exertional chest pain or chest discomfort.  No orthopnea, PND, or lower extremity swelling.  No significant exertional dyspnea.  No specific complaints today.    Past Cardiac Testin. Last Coronary Angio: None  2. Prior Stress Testin, no evidence of inducible ischemia  3. Last Echo:    1.  Moderate concentric LVH, LVEF 65%   2.  Moderate mitral regurgitation   3.  Moderate to severe left atrial dilation   4.  Mild AI   5.  Mild to moderate TR  4. Prior Holter Monitor: None    Review of Systems:   Review of Systems   Constitutional: Negative for activity change, appetite change, chills, diaphoresis, fatigue, fever, unexpected weight gain and unexpected weight loss.   Eyes: Negative for blurred vision and double vision.   Respiratory: Negative for cough, chest tightness, shortness of breath and wheezing.    Cardiovascular: Negative for chest pain, " palpitations and leg swelling.   Gastrointestinal: Negative for abdominal pain, anal bleeding, blood in stool and GERD.   Endocrine: Negative for cold intolerance and heat intolerance.   Genitourinary: Negative for hematuria.   Neurological: Negative for dizziness, syncope, weakness and light-headedness.   Hematological: Does not bruise/bleed easily.   Psychiatric/Behavioral: Negative for depressed mood and stress. The patient is not nervous/anxious.        Past Medical History:   Past Medical History:   Diagnosis Date   • Atrial fibrillation (HCC)    • Elevated glucose 2017    A1C 6.7   • Hyperlipidemia    • Hypertension        Past Surgical History:   Past Surgical History:   Procedure Laterality Date   • HAND SURGERY      LEFT PINKY SEWN BACK ON        Family History:   Family History   Problem Relation Age of Onset   • Cancer Father        Social History:   Social History     Socioeconomic History   • Marital status:    Tobacco Use   • Smoking status: Former Smoker     Packs/day: 3.00     Years: 20.00     Pack years: 60.00     Types: Cigarettes     Quit date:      Years since quittin.4   • Smokeless tobacco: Never Used   Substance and Sexual Activity   • Alcohol use: No   • Drug use: Never   • Sexual activity: Defer       Medications:     Current Outpatient Medications:   •  Accu-Chek Softclix Lancets lancets, , Disp: , Rfl:   •  Alcohol Swabs pads, Use tid e11.9, Disp: 300 each, Rfl: 3  •  apixaban (Eliquis) 5 MG tablet tablet, Take 1 tablet by mouth Every 12 (Twelve) Hours. Indications: Atrial Fibrillation, Disp: 180 tablet, Rfl: 3  •  aspirin 81 MG tablet, Take  by mouth Daily., Disp: , Rfl:   •  atorvastatin (LIPITOR) 20 MG tablet, Take 1 tablet by mouth Every Night., Disp: 90 tablet, Rfl: 3  •  Blood Glucose Calibration (Accu-Chek Darcy) solution, Use as directed. E11.9, Disp: 1 each, Rfl: 3  •  cetirizine (zyrTEC) 10 MG tablet, Daily., Disp: , Rfl:   •  Cyanocobalamin (Vitamin B-12)  "1000 MCG sublingual tablet, Place  under the tongue., Disp: , Rfl:   •  gabapentin (NEURONTIN) 300 MG capsule, Take 1 capsule by mouth 3 (Three) Times a Day As Needed (nerve pain)., Disp: 270 capsule, Rfl: 1  •  glimepiride (Amaryl) 2 MG tablet, Take 1 tablet by mouth Every Morning Before Breakfast., Disp: 90 tablet, Rfl: 3  •  glucose blood test strip, Check glucose twice daily for DM. E11.65, Disp: 100 each, Rfl: 3  •  glucose monitor monitoring kit, ACCU-CHECK ABISAI PLUS METER as requested., Disp: 1 each, Rfl: 1  •  Lancets Misc. misc, TEST TWICE A DAY FOR DM. E11.9. patient requests acc-check softclick, Disp: 200 each, Rfl: 3  •  losartan-hydrochlorothiazide (HYZAAR) 100-12.5 MG per tablet, Take 1 tablet by mouth Daily., Disp: 90 tablet, Rfl: 3  •  metFORMIN (Glucophage) 1000 MG tablet, Take 1 tablet by mouth 2 (Two) Times a Day With Meals. (Patient taking differently: Take 1,000 mg by mouth 2 (Two) Times a Day With Meals. Patient taking once daily), Disp: 180 tablet, Rfl: 3  •  metoprolol tartrate (LOPRESSOR) 50 MG tablet, Take 1 tablet by mouth 2 (Two) Times a Day., Disp: 90 tablet, Rfl: 3  •  multivitamin with minerals tablet tablet, Take 1 tablet by mouth Daily., Disp: , Rfl:   •  traZODone (DESYREL) 50 MG tablet, Take 1 tablet by mouth Every Night., Disp: 90 tablet, Rfl: 3    Allergies:   Allergies   Allergen Reactions   • Sulfa Antibiotics Unknown - Low Severity     Patient states he had allergic reaction to sulfa drugs when he was a child.       Physical Exam:  Vital Signs:   Vitals:    06/20/22 1049 06/20/22 1055   BP: 106/78 108/76   BP Location: Left arm Right arm   Patient Position: Sitting Sitting   Pulse: 56    Resp: 18    SpO2: 99%    Weight: 99.8 kg (220 lb)    Height: 177.8 cm (70\")        Physical Exam  Constitutional:       General: He is not in acute distress.     Appearance: Normal appearance. He is not diaphoretic.   HENT:      Head: Normocephalic and atraumatic.   Cardiovascular:      Rate " and Rhythm: Normal rate. Rhythm irregular.      Heart sounds: No murmur heard.  Pulmonary:      Effort: Pulmonary effort is normal. No respiratory distress.      Breath sounds: Normal breath sounds. No stridor. No wheezing, rhonchi or rales.   Abdominal:      General: Bowel sounds are normal. There is no distension.      Palpations: Abdomen is soft.      Tenderness: There is no abdominal tenderness. There is no guarding or rebound.   Musculoskeletal:         General: No swelling. Normal range of motion.      Cervical back: Neck supple. No tenderness.   Skin:     General: Skin is warm and dry.   Neurological:      General: No focal deficit present.      Mental Status: He is alert and oriented to person, place, and time.   Psychiatric:         Mood and Affect: Mood normal.         Behavior: Behavior normal.         Results Review:   I reviewed the patient's new clinical results.  I personally viewed and interpreted the patient's EKG/Telemetry data      ECG 12 Lead    Date/Time: 6/20/2022 12:47 PM  Performed by: Cruzito Grajeda MD  Authorized by: Cruzito Grajeda MD   Comparison: not compared with previous ECG   Rhythm: atrial fibrillation  Rate: normal  Conduction: right bundle branch block  QRS axis: normal    Clinical impression: abnormal EKG            Assessment / Plan:     1. Atrial fibrillation, permanent   -- ECG today shows rate controlled atrial fibrillation  --Asymptomatic  --Continue metoprolol for ventricular rate control strategy  --Continue Eliquis for CVA prophylaxis  --Follow-up in 1 year, sooner if required    2.  Moderate mitral regurgitation  --Last echocardiogram in 2014 suggested moderate mitral regurgitation  -- Repeat echocardiogram to reassess    3. Primary hypertension  -- BP well controlled with current antihypertensives    4. Pure hypercholesterolemia  -- Last LDL in 7/21 at goal at 63  --Continue high intensity statin    5. Type 2 diabetes mellitus   -- Last hemoglobin A1c in 5/22  6.3%  -- Management per PCP      Follow Up:   Return in about 1 year (around 6/20/2023).      Thank you for allowing me to participate in the care of your patient. Please to not hesitate to contact me with additional questions or concerns.     CAROLE Grajeda MD  Interventional Cardiology   06/20/2022  10:57 EDT

## 2022-06-20 NOTE — PROGRESS NOTES
[facility] Cardiology welcome email and rounding message has been sent to patient via [employer].

## 2022-08-02 DIAGNOSIS — I48.20 ATRIAL FIBRILLATION, CHRONIC: ICD-10-CM

## 2022-08-15 ENCOUNTER — OFFICE VISIT (OUTPATIENT)
Dept: INTERNAL MEDICINE | Facility: CLINIC | Age: 67
End: 2022-08-15

## 2022-08-15 VITALS
DIASTOLIC BLOOD PRESSURE: 78 MMHG | OXYGEN SATURATION: 99 % | HEIGHT: 70 IN | HEART RATE: 60 BPM | WEIGHT: 217 LBS | TEMPERATURE: 96.9 F | SYSTOLIC BLOOD PRESSURE: 120 MMHG | BODY MASS INDEX: 31.07 KG/M2

## 2022-08-15 DIAGNOSIS — G47.9 TROUBLE IN SLEEPING: ICD-10-CM

## 2022-08-15 DIAGNOSIS — E55.9 VITAMIN D DEFICIENCY: ICD-10-CM

## 2022-08-15 DIAGNOSIS — E78.5 DYSLIPIDEMIA: ICD-10-CM

## 2022-08-15 DIAGNOSIS — G63 NEUROPATHY DUE TO MEDICAL CONDITION: ICD-10-CM

## 2022-08-15 DIAGNOSIS — I10 ESSENTIAL HYPERTENSION: ICD-10-CM

## 2022-08-15 DIAGNOSIS — R79.9 ABNORMAL BLOOD CHEMISTRY: ICD-10-CM

## 2022-08-15 DIAGNOSIS — M25.559 HIP PAIN: ICD-10-CM

## 2022-08-15 DIAGNOSIS — E66.09 CLASS 1 OBESITY DUE TO EXCESS CALORIES WITH SERIOUS COMORBIDITY AND BODY MASS INDEX (BMI) OF 31.0 TO 31.9 IN ADULT: ICD-10-CM

## 2022-08-15 DIAGNOSIS — E11.65 TYPE 2 DIABETES MELLITUS WITH HYPERGLYCEMIA, WITHOUT LONG-TERM CURRENT USE OF INSULIN: ICD-10-CM

## 2022-08-15 DIAGNOSIS — R25.1 TREMOR: ICD-10-CM

## 2022-08-15 DIAGNOSIS — Z91.81 AT HIGH RISK FOR FALLS: ICD-10-CM

## 2022-08-15 DIAGNOSIS — I48.20 ATRIAL FIBRILLATION, CHRONIC: ICD-10-CM

## 2022-08-15 DIAGNOSIS — Z00.00 MEDICARE ANNUAL WELLNESS VISIT, SUBSEQUENT: Primary | ICD-10-CM

## 2022-08-15 DIAGNOSIS — Z51.81 MEDICATION MONITORING ENCOUNTER: ICD-10-CM

## 2022-08-15 DIAGNOSIS — E53.8 B12 DEFICIENCY: ICD-10-CM

## 2022-08-15 PROBLEM — M79.641 PAIN IN BOTH HANDS: Status: RESOLVED | Noted: 2020-06-25 | Resolved: 2022-08-15

## 2022-08-15 PROBLEM — M79.642 PAIN IN BOTH HANDS: Status: RESOLVED | Noted: 2020-06-25 | Resolved: 2022-08-15

## 2022-08-15 LAB
POC CREATININE URINE: 100
POC MICROALBUMIN URINE: 10

## 2022-08-15 PROCEDURE — 99397 PER PM REEVAL EST PAT 65+ YR: CPT | Performed by: FAMILY MEDICINE

## 2022-08-15 PROCEDURE — G0439 PPPS, SUBSEQ VISIT: HCPCS | Performed by: FAMILY MEDICINE

## 2022-08-15 PROCEDURE — 1170F FXNL STATUS ASSESSED: CPT | Performed by: FAMILY MEDICINE

## 2022-08-15 PROCEDURE — 1125F AMNT PAIN NOTED PAIN PRSNT: CPT | Performed by: FAMILY MEDICINE

## 2022-08-15 PROCEDURE — 1159F MED LIST DOCD IN RCRD: CPT | Performed by: FAMILY MEDICINE

## 2022-08-15 PROCEDURE — 82044 UR ALBUMIN SEMIQUANTITATIVE: CPT | Performed by: FAMILY MEDICINE

## 2022-08-15 PROCEDURE — 99213 OFFICE O/P EST LOW 20 MIN: CPT | Performed by: FAMILY MEDICINE

## 2022-08-15 RX ORDER — GABAPENTIN 300 MG/1
300 CAPSULE ORAL 4 TIMES DAILY PRN
Qty: 360 CAPSULE | Refills: 1 | Status: SHIPPED | OUTPATIENT
Start: 2022-08-15 | End: 2022-11-15 | Stop reason: SDUPTHER

## 2022-08-15 RX ORDER — TRAZODONE HYDROCHLORIDE 50 MG/1
50 TABLET ORAL NIGHTLY
Qty: 90 TABLET | Refills: 3 | Status: SHIPPED | OUTPATIENT
Start: 2022-08-15

## 2022-08-15 RX ORDER — TRAZODONE HYDROCHLORIDE 50 MG/1
50 TABLET ORAL NIGHTLY
Qty: 90 TABLET | Refills: 3 | Status: SHIPPED | OUTPATIENT
Start: 2022-08-15 | End: 2022-08-15 | Stop reason: SDUPTHER

## 2022-08-15 NOTE — PATIENT INSTRUCTIONS
Fall Prevention in the Home, Adult  Falls can cause injuries and can affect people from all age groups. There are many simple things that you can do to make your home safe and to help prevent falls. Ask for help when making these changes, if needed.  What actions can I take to prevent falls?  General instructions  Use good lighting in all rooms. Replace any light bulbs that burn out.  Turn on lights if it is dark. Use night-lights.  Place frequently used items in easy-to-reach places. Lower the shelves around your home if necessary.  Set up furniture so that there are clear paths around it. Avoid moving your furniture around.  Remove throw rugs and other tripping hazards from the floor.  Avoid walking on wet floors.  Fix any uneven floor surfaces.  Add color or contrast paint or tape to grab bars and handrails in your home. Place contrasting color strips on the first and last steps of stairways.  When you use a stepladder, make sure that it is completely opened and that the sides are firmly locked. Have someone hold the ladder while you are using it. Do not climb a closed stepladder.  Be aware of any and all pets.  What can I do in the bathroom?         Keep the floor dry. Immediately clean up any water that spills onto the floor.  Remove soap buildup in the tub or shower on a regular basis.  Use non-skid mats or decals on the floor of the tub or shower.  Attach bath mats securely with double-sided, non-slip rug tape.  If you need to sit down while you are in the shower, use a plastic, non-slip stool.  Install grab bars by the toilet and in the tub and shower. Do not use towel bars as grab bars.  What can I do in the bedroom?  Make sure that a bedside light is easy to reach.  Do not use oversized bedding that drapes onto the floor.  Have a firm chair that has side arms to use for getting dressed.  What can I do in the kitchen?  Clean up any spills right away.  If you need to reach for something above you, use a  sturdy step stool that has a grab bar.  Keep electrical cables out of the way.  Do not use floor polish or wax that makes floors slippery. If you must use wax, make sure that it is non-skid floor wax.  What can I do in the stairways?  Do not leave any items on the stairs.  Make sure that you have a light switch at the top of the stairs and the bottom of the stairs. Have them installed if you do not have them.  Make sure that there are handrails on both sides of the stairs. Fix handrails that are broken or loose. Make sure that handrails are as long as the stairways.  Install non-slip stair treads on all stairs in your home.  Avoid having throw rugs at the top or bottom of stairways, or secure the rugs with carpet tape to prevent them from moving.  Choose a carpet design that does not hide the edge of steps on the stairway.  Check any carpeting to make sure that it is firmly attached to the stairs. Fix any carpet that is loose or worn.  What can I do on the outside of my home?  Use bright outdoor lighting.  Regularly repair the edges of walkways and driveways and fix any cracks.  Remove high doorway thresholds.  Trim any shrubbery on the main path into your home.  Regularly check that handrails are securely fastened and in good repair. Both sides of any steps should have handrails.  Install guardrails along the edges of any raised decks or porches.  Clear walkways of debris and clutter, including tools and rocks.  Have leaves, snow, and ice cleared regularly.  Use sand or salt on walkways during winter months.  In the garage, clean up any spills right away, including grease or oil spills.  What other actions can I take?  Wear closed-toe shoes that fit well and support your feet. Wear shoes that have rubber soles or low heels.  Use mobility aids as needed, such as canes, walkers, scooters, and crutches.  Review your medicines with your health care provider. Some medicines can cause dizziness or changes in blood  pressure, which increase your risk of falling.  Talk with your health care provider about other ways that you can decrease your risk of falls. This may include working with a physical therapist or  to improve your strength, balance, and endurance.  Where to find more information  Centers for Disease Control and PreventionCRISTIANA: https://www.cdc.gov  National [place] on Aging: https://pf4fxzd.shelby.nih.gov  Contact a health care provider if:  You are afraid of falling at home.  You feel weak, drowsy, or dizzy at home.  You fall at home.  Summary  There are many simple things that you can do to make your home safe and to help prevent falls.  Ways to make your home safe include removing tripping hazards and installing grab bars in the bathroom.  Ask for help when making these changes in your home.  This information is not intended to replace advice given to you by your health care provider. Make sure you discuss any questions you have with your health care provider.  Document Revised: 2018 Document Reviewed: 2018  [employer] Patient Education 2021 [employer]      Medicare Wellness  Personal Prevention Plan of Service     Date of Office Visit:    Encounter Provider:  Cindy Zhang MD  Place of Service:  [facility]  Patient Name: Yehuda Capellan  :  1955    As part of the Medicare Wellness portion of your visit today, we are providing you with this personalized preventive plan of services (PPPS). This plan is based upon recommendations of the [place] Preventive Services Task Force (USPSTF) and the Advisory Committee on Immunization Practices (ACIP).    This lists the preventive care services that should be considered, and provides dates of when you are due. Items listed as completed are up-to-date and do not require any further intervention.    Health Maintenance   Topic Date Due    DIABETIC FOOT EXAM  2021    COVID-19 Vaccine (3 -  Booster) 09/29/2021    DIABETIC EYE EXAM  02/10/2022    ANNUAL WELLNESS VISIT  07/09/2022    LIPID PANEL  07/09/2022    TDAP/TD VACCINES (1 - Tdap) 08/01/2023 (Originally 8/17/2019)    ZOSTER VACCINE (2 of 3) 08/16/2029 (Originally 10/24/2014)    INFLUENZA VACCINE  10/01/2022    HEMOGLOBIN A1C  11/06/2022    COLORECTAL CANCER SCREENING  01/07/2023    URINE MICROALBUMIN  08/15/2023    HEPATITIS C SCREENING  Completed    Pneumococcal Vaccine 65+  Completed    AAA SCREEN (ONE-TIME)  Completed       Orders Placed This Encounter   Procedures    Ambulatory Referral to Orthopedic Surgery     Referral Priority:   Routine     Referral Type:   Consultation     Referral Reason:   Specialty Services Required     Requested Specialty:   Orthopedic Surgery     Number of Visits Requested:   1    POC Microalbumin     Order Specific Question:   Release to patient     Answer:   Routine Release       Return in about 3 months (around 11/18/2022) for Diabetes follow up.

## 2022-08-15 NOTE — PROGRESS NOTES
The ABCs of the Annual Wellness Visit  Subsequent Medicare Wellness Visit    Chief Complaint   Patient presents with   • Medicare Wellness-subsequent     AWV and preventive care      Subjective    History of Present Illness:  Yehuda Capellan is a 67 y.o. male who presents for a Subsequent Medicare Wellness Visit.    Shaking a lot, tremors. Believes mother had tremor. Hands. Can make it stop if he concentrates. Doesn't suspect hypoglycemia related to diabetes mellitus, med. No trouble with gait other than hip pain, which is worsening over time. Asks if he can see specialist, maybe pain doctor. Gabapentin has helped with nerve pain but med seems to wear off too soon. Trazodone continues to help with sleeping.     The following portions of the patient's history were reviewed and   updated as appropriate: allergies, current medications, past family history, past medical history, past social history, past surgical history and problem list.    Compared to one year ago, the patient feels his physical   health is better.    Compared to one year ago, the patient feels his mental   health is the same.    Recent Hospitalizations:  He was not admitted to the hospital during the last year.       Current Medical Providers:  Patient Care Team:  Cindy Zhang MD as PCP - General (Family Medicine)  Jennifer Lawler MD as Consulting Physician (Endocrinology)    Outpatient Medications Prior to Visit   Medication Sig Dispense Refill   • Accu-Chek Softclix Lancets lancets      • Alcohol Swabs pads Use tid e11.9 300 each 3   • apixaban (Eliquis) 5 MG tablet tablet Take 1 tablet by mouth Every 12 (Twelve) Hours. Indications: Atrial Fibrillation 60 tablet 3   • aspirin 81 MG tablet Take  by mouth Daily.     • atorvastatin (LIPITOR) 20 MG tablet Take 1 tablet by mouth Every Night. 90 tablet 3   • Blood Glucose Calibration (Accu-Chek Darcy) solution Use as directed. E11.9 1 each 3   • cetirizine (zyrTEC) 10 MG tablet Daily.     •  Cyanocobalamin (Vitamin B-12) 1000 MCG sublingual tablet Place  under the tongue.     • glimepiride (Amaryl) 2 MG tablet Take 1 tablet by mouth Every Morning Before Breakfast. 90 tablet 3   • glucose blood (Accu-Chek Darcy Plus) test strip CHECK GLUCOSE TWO TIMES A  DAY FOR DIABETES MELLITUS 200 each 3   • glucose monitor monitoring kit ACCU-CHECK DARCY PLUS METER as requested. 1 each 1   • Lancets Misc. misc TEST TWICE A DAY FOR DM. E11.9. patient requests acc-check softclick 200 each 3   • losartan-hydrochlorothiazide (HYZAAR) 100-12.5 MG per tablet Take 1 tablet by mouth Daily. 90 tablet 3   • metFORMIN (Glucophage) 1000 MG tablet Take 1 tablet by mouth 2 (Two) Times a Day With Meals. (Patient taking differently: Take 1,000 mg by mouth 2 (Two) Times a Day With Meals. Patient taking once daily) 180 tablet 3   • metoprolol tartrate (LOPRESSOR) 50 MG tablet Take 1 tablet by mouth 2 (Two) Times a Day. 90 tablet 3   • multivitamin with minerals tablet tablet Take 1 tablet by mouth Daily.     • gabapentin (NEURONTIN) 300 MG capsule Take 1 capsule by mouth 3 (Three) Times a Day As Needed (nerve pain). 270 capsule 1   • traZODone (DESYREL) 50 MG tablet Take 1 tablet by mouth Every Night. 90 tablet 3     No facility-administered medications prior to visit.       No opioid medication identified on active medication list. I have reviewed chart for other potential  high risk medication/s and harmful drug interactions in the elderly.          Aspirin is on active medication list. Aspirin use is indicated based on review of current medical condition/s. Pros and cons of this therapy have been discussed today. Benefits of this medication outweigh potential harm.  Patient has been encouraged to continue taking this medication.  .      Patient Active Problem List   Diagnosis   • Atrial fibrillation, chronic (HCC)   • Dyslipidemia   • Essential hypertension   • Type 2 diabetes mellitus with hyperglycemia, without long-term current  "use of insulin (HCC)   • Multinodular goiter (nontoxic)     Advance Care Planning  Advance Directive is on file.  ACP discussion was held with the patient during this visit. Patient has an advance directive in EMR which is still valid.     Review of Systems   Constitutional: Negative for fever.   Genitourinary:        Nocturia sometimes   Musculoskeletal: Positive for arthralgias and gait problem.   Neurological: Positive for tremors.   Psychiatric/Behavioral: Negative for sleep disturbance.   All other systems reviewed and are negative.       Objective    Vitals:    08/15/22 1504   BP: 120/78   Pulse: 60   Temp: 96.9 °F (36.1 °C)   SpO2: 99%   Weight: 98.4 kg (217 lb)   Height: 177.8 cm (70\")   PainSc:   3     Estimated body mass index is 31.14 kg/m² as calculated from the following:    Height as of this encounter: 177.8 cm (70\").    Weight as of this encounter: 98.4 kg (217 lb).    BMI is >= 30 and <35. (Class 1 Obesity). The following options were offered after discussion;: weight loss educational material (shared in after visit summary)      Does the patient have evidence of cognitive impairment? No    Physical Exam  Vitals and nursing note reviewed.   Constitutional:       General: He is not in acute distress.     Appearance: Normal appearance. He is well-developed and well-groomed. He is obese. He is not ill-appearing, toxic-appearing or diaphoretic.      Interventions: Face mask in place.   HENT:      Head: Normocephalic and atraumatic.      Right Ear: Hearing and external ear normal.      Left Ear: Hearing and external ear normal.   Eyes:      General: Lids are normal. Gaze aligned appropriately. No scleral icterus.        Right eye: No discharge.         Left eye: No discharge.      Extraocular Movements: Extraocular movements intact.      Conjunctiva/sclera: Conjunctivae normal.      Pupils: Pupils are equal, round, and reactive to light.   Neck:      Thyroid: No thyromegaly.      Trachea: Phonation " normal.   Cardiovascular:      Rate and Rhythm: Normal rate and regular rhythm.      Heart sounds: Normal heart sounds.   Pulmonary:      Effort: Pulmonary effort is normal.      Breath sounds: Normal breath sounds and air entry.   Musculoskeletal:         General: No deformity or signs of injury.      Cervical back: Neck supple.   Skin:     General: Skin is warm.      Capillary Refill: Capillary refill takes less than 2 seconds.      Coloration: Skin is not cyanotic, jaundiced or pale.      Findings: No rash.   Neurological:      General: No focal deficit present.      Mental Status: He is alert and oriented to person, place, and time. Mental status is at baseline.      Cranial Nerves: No dysarthria.      Motor: No tremor, abnormal muscle tone or seizure activity.      Gait: Gait abnormal.   Psychiatric:         Attention and Perception: Attention and perception normal.         Mood and Affect: Mood and affect normal.         Speech: Speech normal.         Behavior: Behavior normal. Behavior is cooperative.         Thought Content: Thought content normal.         Cognition and Memory: Cognition and memory normal.         Judgment: Judgment normal.                 HEALTH RISK ASSESSMENT    Smoking Status:  Social History     Tobacco Use   Smoking Status Former Smoker   • Packs/day: 3.00   • Years: 20.00   • Pack years: 60.00   • Types: Cigarettes   • Quit date:    • Years since quittin.6   Smokeless Tobacco Never Used     Alcohol Consumption:  Social History     Substance and Sexual Activity   Alcohol Use No     Fall Risk Screen:    STEADI Fall Risk Assessment was completed, and patient is at HIGH risk for falls. Assessment completed on:8/15/2022    Depression Screening:  PHQ-2/PHQ-9 Depression Screening 8/15/2022   Retired PHQ-9 Total Score -   Retired Total Score -   Little Interest or Pleasure in Doing Things 0-->not at all   Feeling Down, Depressed or Hopeless 0-->not at all   PHQ-9: Brief Depression  Severity Measure Score 0       Health Habits and Functional and Cognitive Screening:  Functional & Cognitive Status 8/15/2022   Do you have difficulty preparing food and eating? No   Do you have difficulty bathing yourself, getting dressed or grooming yourself? No   Do you have difficulty using the toilet? No   Do you have difficulty moving around from place to place? No   Do you have trouble with steps or getting out of a bed or a chair? No   Current Diet Diabetic Diet   Dental Exam Up to date        Dental Exam Comment -   Eye Exam Up to date        Eye Exam Comment -   Exercise (times per week) 4 times per week   Current Exercises Include Yard Work;Walking   Current Exercise Activities Include -   Do you need help using the phone?  No   Are you deaf or do you have serious difficulty hearing?  Yes   Do you need help with transportation? No   Do you need help shopping? No   Do you need help preparing meals?  No   Do you need help with housework?  No   Do you need help with laundry? No   Do you need help taking your medications? No   Do you need help managing money? No   Do you ever drive or ride in a car without wearing a seat belt? No   Have you felt unusual stress, anger or loneliness in the last month? No   Who do you live with? Spouse   If you need help, do you have trouble finding someone available to you? No   Have you been bothered in the last four weeks by sexual problems? No   Do you have difficulty concentrating, remembering or making decisions? -       Age-appropriate Screening Schedule:  Refer to the list below for future screening recommendations based on patient's age, sex and/or medical conditions. Orders for these recommended tests are listed in the plan section. The patient has been provided with a written plan.    Health Maintenance   Topic Date Due   • DIABETIC FOOT EXAM  02/26/2021   • LIPID PANEL  07/09/2022   • DIABETIC EYE EXAM  10/03/2022 (Originally 2/10/2022)   • TDAP/TD VACCINES (1 -  Tdap) 08/01/2023 (Originally 8/17/2019)   • ZOSTER VACCINE (2 of 3) 08/16/2029 (Originally 10/24/2014)   • INFLUENZA VACCINE  10/01/2022   • HEMOGLOBIN A1C  11/06/2022   • URINE MICROALBUMIN  08/15/2023              Assessment & Plan   CMS Preventative Services Quick Reference  Risk Factors Identified During Encounter  Cardiovascular Disease  Chronic Pain   Fall Risk-High or Moderate  Inactivity/Sedentary  Obesity/Overweight   Polypharmacy  The above risks/problems have been discussed with the patient.  Follow up actions/plans if indicated are seen below in the Assessment/Plan Section.  Pertinent information has been shared with the patient in the After Visit Summary.    Diagnoses and all orders for this visit:    1. Medicare annual wellness visit, subsequent (Primary)    2. Type 2 diabetes mellitus with hyperglycemia, without long-term current use of insulin (HCC)  -     POC Microalbumin  -     Hemoglobin A1c  -     Vitamin B12  -     Folate  -     Lipid Panel  -     Comprehensive Metabolic Panel    3. Neuropathy due to medical condition ([facility])  Comments:  diabetes; adjusted gabapentin as condition not well controlled, up from 300 mg tid to 4x daily prn  Orders:  -     gabapentin (NEURONTIN) 300 MG capsule; Take 1 capsule by mouth 4 (Four) Times a Day As Needed (nerve pain).  Dispense: 360 capsule; Refill: 1    4. Atrial fibrillation, chronic (HCC)  -     TSH+Free T4  -     CBC (No Diff)    5. Tremor  Comments:  likely familial, low suspicion for Parkinsons. suggested heavier silverware  Orders:  -     TSH+Free T4    6. Dyslipidemia  -     Lipid Panel    7. Trouble in sleeping  -     traZODone (DESYREL) 50 MG tablet; Take 1 tablet by mouth Every Night.  Dispense: 90 tablet; Refill: 3    8. Essential hypertension  -     TSH+Free T4    9. Hip pain  Comments:  worsening over time. needs further evaluation. see orthopedics first, pain management later if needed  Orders:  -     Ambulatory Referral to Orthopedic  Surgery    10. Medication monitoring encounter  -     Hemoglobin A1c  -     Vitamin B12  -     Folate  -     Lipid Panel  -     TSH+Free T4  -     CBC (No Diff)  -     Comprehensive Metabolic Panel    11. Class 1 obesity due to excess calories with serious comorbidity and body mass index (BMI) of 31.0 to 31.9 in adult  Comments:  info on mediterranean diet via avs  Orders:  -     Vitamin D 25 Hydroxy    12. Abnormal blood chemistry  -     Hemoglobin A1c  -     Vitamin B12  -     Folate  -     Lipid Panel  -     TSH+Free T4  -     CBC (No Diff)  -     Comprehensive Metabolic Panel    13. Vitamin D deficiency  -     Comprehensive Metabolic Panel  -     Vitamin D 25 Hydroxy    14. B12 deficiency  -     Vitamin B12  -     Folate  -     CBC (No Diff)    15. At high risk for falls  Comments:  info via avs    Other orders  -     Discontinue: traZODone (DESYREL) 50 MG tablet; Take 1 tablet by mouth Every Night.  Dispense: 90 tablet; Refill: 3        Follow Up:   Return in about 3 months (around 11/18/2022) for Diabetes follow up.     An After Visit Summary and PPPS were made available to the patient.          I spent 35 minutes caring for Senate on this date of service. This time includes time spent by me in the following activities:preparing for the visit, reviewing tests, performing a medically appropriate examination and/or evaluation , counseling and educating the patient/family/caregiver, ordering medications, tests, or procedures, referring and communicating with other health care professionals  and documenting information in the medical record    I spent 15 minutes on the separately reported service of [id number]. This time is not included in the time used to support the E/M service also reported today.

## 2022-08-16 LAB
25(OH)D3+25(OH)D2 SERPL-MCNC: 39.9 NG/ML (ref 30–100)
ALBUMIN SERPL-MCNC: 5 G/DL (ref 3.8–4.8)
ALBUMIN/GLOB SERPL: 2 {RATIO} (ref 1.2–2.2)
ALP SERPL-CCNC: 69 IU/L (ref 44–121)
ALT SERPL-CCNC: 60 IU/L (ref 0–44)
AST SERPL-CCNC: 39 IU/L (ref 0–40)
BILIRUB SERPL-MCNC: 0.7 MG/DL (ref 0–1.2)
BUN SERPL-MCNC: 22 MG/DL (ref 8–27)
BUN/CREAT SERPL: 23 (ref 10–24)
CALCIUM SERPL-MCNC: 10.8 MG/DL (ref 8.6–10.2)
CHLORIDE SERPL-SCNC: 98 MMOL/L (ref 96–106)
CHOLEST SERPL-MCNC: 118 MG/DL (ref 100–199)
CO2 SERPL-SCNC: 26 MMOL/L (ref 20–29)
CREAT SERPL-MCNC: 0.97 MG/DL (ref 0.76–1.27)
EGFRCR-CYS SERPLBLD CKD-EPI 2021: 85 ML/MIN/1.73
ERYTHROCYTE [DISTWIDTH] IN BLOOD BY AUTOMATED COUNT: 12.5 % (ref 11.6–15.4)
FOLATE SERPL-MCNC: >20 NG/ML
GLOBULIN SER CALC-MCNC: 2.5 G/DL (ref 1.5–4.5)
GLUCOSE SERPL-MCNC: 105 MG/DL (ref 65–99)
HBA1C MFR BLD: 6.6 % (ref 4.8–5.6)
HCT VFR BLD AUTO: 50.8 % (ref 37.5–51)
HDLC SERPL-MCNC: 34 MG/DL
HGB BLD-MCNC: 17.7 G/DL (ref 13–17.7)
LDLC SERPL CALC-MCNC: 50 MG/DL (ref 0–99)
MCH RBC QN AUTO: 30.2 PG (ref 26.6–33)
MCHC RBC AUTO-ENTMCNC: 34.8 G/DL (ref 31.5–35.7)
MCV RBC AUTO: 87 FL (ref 79–97)
PLATELET # BLD AUTO: 327 X10E3/UL (ref 150–450)
POTASSIUM SERPL-SCNC: 4.9 MMOL/L (ref 3.5–5.2)
PROT SERPL-MCNC: 7.5 G/DL (ref 6–8.5)
RBC # BLD AUTO: 5.86 X10E6/UL (ref 4.14–5.8)
SODIUM SERPL-SCNC: 140 MMOL/L (ref 134–144)
T4 FREE SERPL-MCNC: 1.23 NG/DL (ref 0.82–1.77)
TRIGL SERPL-MCNC: 210 MG/DL (ref 0–149)
TSH SERPL DL<=0.005 MIU/L-ACNC: 1.52 UIU/ML (ref 0.45–4.5)
VIT B12 SERPL-MCNC: 653 PG/ML (ref 232–1245)
VLDLC SERPL CALC-MCNC: 34 MG/DL (ref 5–40)
WBC # BLD AUTO: 11.4 X10E3/UL (ref 3.4–10.8)

## 2022-08-17 NOTE — PROGRESS NOTES
A1C shows good diabetes mellitus control, goal is to keep under 7 and it's 6.6. no med changes suggested. Vitamin B12/folic acid levels normal. Cholesterol okay (triglycerides high but non-fasting check). Continue cholesterol med. Normal thyroid levels. White blood cell count slightly elevated, nonspecific finding. Possibly related to viral infection. Calcium level also mildly elevated as well as one liver enzyme.  Again nonspecific findings.  Suggest we recheck in 3 months.

## 2022-09-13 RX ORDER — METOPROLOL TARTRATE 50 MG/1
TABLET, FILM COATED ORAL
Qty: 90 TABLET | Refills: 3 | Status: SHIPPED | OUTPATIENT
Start: 2022-09-13 | End: 2022-11-15 | Stop reason: SDUPTHER

## 2022-09-13 NOTE — TELEPHONE ENCOUNTER
Rx Refill Note  Requested Prescriptions     Pending Prescriptions Disp Refills   • metoprolol tartrate (LOPRESSOR) 50 MG tablet [Pharmacy Med Name: METOPROL TAR TAB 50MG] 90 tablet 3     Sig: TAKE 1 TABLET TWICE A DAY      Last office visit with prescribing clinician: 1/10/2022      Next office visit with prescribing clinician: Visit date not found            BETTY HODGES MA  09/13/22, 10:51 EDT

## 2022-09-16 DIAGNOSIS — M25.551 ARTHRALGIA OF RIGHT HIP: Primary | ICD-10-CM

## 2022-09-19 ENCOUNTER — OFFICE VISIT (OUTPATIENT)
Dept: ORTHOPEDIC SURGERY | Facility: CLINIC | Age: 67
End: 2022-09-19

## 2022-09-19 VITALS — BODY MASS INDEX: 31.07 KG/M2 | HEIGHT: 70 IN | WEIGHT: 217 LBS | TEMPERATURE: 97.3 F

## 2022-09-19 DIAGNOSIS — M25.551 ARTHRALGIA OF RIGHT HIP: Primary | ICD-10-CM

## 2022-09-19 DIAGNOSIS — M16.11 PRIMARY OSTEOARTHRITIS OF RIGHT HIP: ICD-10-CM

## 2022-09-19 DIAGNOSIS — M51.36 DDD (DEGENERATIVE DISC DISEASE), LUMBAR: ICD-10-CM

## 2022-09-19 PROCEDURE — 99213 OFFICE O/P EST LOW 20 MIN: CPT | Performed by: PHYSICIAN ASSISTANT

## 2022-09-19 NOTE — PROGRESS NOTES
Subjective   Patient ID: Yehuda Capellan is a 67 y.o. right hand dominant male  Pain of the Right Hip (No recent injury, had a hip fracture at age 17, reports ongoing increasing pain for years)         History of Present Illness  Patient presents with right hip pain ongoing for approx. 10 - 15 years but seems to get worse.   The pain is limited his walking ability.   He mentions at age 17, he had a right hip fracture ( treated non surgically) since, he has has pain and a limp.                                                  Past Medical History:   Diagnosis Date   • Atrial fibrillation (HCC)    • Elevated glucose 2017    A1C 6.7   • Hip fracture (HCC)     right, at age 17   • Hyperlipidemia    • Hypertension         Past Surgical History:   Procedure Laterality Date   • HAND SURGERY Left     LEFT PINKY SEWN BACK ON in [place] , cut off with table saw       Family History   Problem Relation Age of Onset   • Cancer Father        Social History     Socioeconomic History   • Marital status:    Tobacco Use   • Smoking status: Former Smoker     Packs/day: 3.00     Years: 20.00     Pack years: 60.00     Types: Cigarettes     Quit date:      Years since quittin.7   • Smokeless tobacco: Never Used   Vaping Use   • Vaping Use: Never used   Substance and Sexual Activity   • Alcohol use: No   • Drug use: Never   • Sexual activity: Defer         Current Outpatient Medications:   •  Accu-Chek Softclix Lancets lancets, , Disp: , Rfl:   •  Alcohol Swabs pads, Use tid e11.9, Disp: 300 each, Rfl: 3  •  apixaban (Eliquis) 5 MG tablet tablet, Take 1 tablet by mouth Every 12 (Twelve) Hours. Indications: Atrial Fibrillation, Disp: 60 tablet, Rfl: 3  •  aspirin 81 MG tablet, Take  by mouth Daily., Disp: , Rfl:   •  atorvastatin (LIPITOR) 20 MG tablet, Take 1 tablet by mouth Every Night., Disp: 90 tablet, Rfl: 3  •  Blood Glucose Calibration (Accu-Chek Darcy) solution, Use as directed. E11.9, Disp: 1 each, Rfl:  3  •  cetirizine (zyrTEC) 10 MG tablet, Daily., Disp: , Rfl:   •  Cyanocobalamin (Vitamin B-12) 1000 MCG sublingual tablet, Place  under the tongue., Disp: , Rfl:   •  gabapentin (NEURONTIN) 300 MG capsule, Take 1 capsule by mouth 4 (Four) Times a Day As Needed (nerve pain)., Disp: 360 capsule, Rfl: 1  •  glimepiride (Amaryl) 2 MG tablet, Take 1 tablet by mouth Every Morning Before Breakfast., Disp: 90 tablet, Rfl: 3  •  glucose blood (Accu-Chek Darcy Plus) test strip, CHECK GLUCOSE TWO TIMES A  DAY FOR DIABETES MELLITUS, Disp: 200 each, Rfl: 3  •  glucose monitor monitoring kit, ACCU-CHECK DARCY PLUS METER as requested., Disp: 1 each, Rfl: 1  •  Lancets Misc. misc, TEST TWICE A DAY FOR DM. E11.9. patient requests acc-check softclick, Disp: 200 each, Rfl: 3  •  losartan-hydrochlorothiazide (HYZAAR) 100-12.5 MG per tablet, Take 1 tablet by mouth Daily., Disp: 90 tablet, Rfl: 3  •  metFORMIN (Glucophage) 1000 MG tablet, Take 1 tablet by mouth 2 (Two) Times a Day With Meals. (Patient taking differently: Take 1,000 mg by mouth 2 (Two) Times a Day With Meals. Patient taking once daily), Disp: 180 tablet, Rfl: 3  •  metoprolol tartrate (LOPRESSOR) 50 MG tablet, TAKE 1 TABLET TWICE A DAY, Disp: 90 tablet, Rfl: 3  •  multivitamin with minerals tablet tablet, Take 1 tablet by mouth Daily., Disp: , Rfl:   •  traZODone (DESYREL) 50 MG tablet, Take 1 tablet by mouth Every Night., Disp: 90 tablet, Rfl: 3    Allergies   Allergen Reactions   • Sulfa Antibiotics Unknown - Low Severity     Patient states he had allergic reaction to sulfa drugs when he was a child.       Review of Systems   Constitutional: Negative for diaphoresis, fever and unexpected weight change.   HENT: Negative for dental problem and sore throat.    Eyes: Negative for visual disturbance.   Respiratory: Negative for shortness of breath.    Cardiovascular: Negative for chest pain.   Gastrointestinal: Negative for abdominal pain, constipation, diarrhea, nausea and  "vomiting.   Genitourinary: Negative for difficulty urinating and frequency.   Musculoskeletal: Positive for arthralgias (right hip).   Neurological: Negative for headaches.   Hematological: Does not bruise/bleed easily.       I have reviewed the medical and surgical history, family history, social history, medications, and/or allergies, and the review of systems of this report.    Objective   Temp 97.3 °F (36.3 °C)   Ht 177.8 cm (70\")   Wt 98.4 kg (217 lb)   BMI 31.14 kg/m²    Physical Exam  Vitals and nursing note reviewed.   Constitutional:       Appearance: Normal appearance.   Pulmonary:      Effort: Pulmonary effort is normal.   Musculoskeletal:      Lumbar back: Tenderness present. Positive right straight leg raise test (at 60 degrees).        Back:       Right hip: No crepitus. Decreased range of motion.   Neurological:      Mental Status: He is alert and oriented to person, place, and time.       Back Exam     Tests   Straight leg raise right: positive (at 60 degrees)           Extremity DVT signs are negative on physical exam with negative Jenny sign, no calf pain, no palpable cords and no skin tone change   Neurologic Exam     Mental Status   Oriented to person, place, and time.            Assessment & Plan   Independent Review of Radiographic Studies:    X-ray of the right hip 2 view performed in the clinic independently reviewed to the evaluation of hip pain.  No comparison films available to review.  There is no obvious acute fracture or dislocation.  There does appear to be severe right hip degenerative changes with periarticular spurring.      X-ray of the lumbar spine 2 view performed in the clinic independently reviewed for the evaluation of lumbar radicular pain.  No comparison films available to view.  No acute fracture or dislocation.  There does appear to be multilevel degenerative changes with neuroforaminal narrowing at L5-S1      Procedures       Diagnoses and all orders for this " visit:    1. Arthralgia of right hip (Primary)  -     XR Spine Lumbar 2 or 3 View  -     FL Guided Pain Management Large Joint; Future    2. Primary osteoarthritis of right hip  -     FL Guided Pain Management Large Joint; Future    3. DDD (degenerative disc disease), lumbar       Orthopedic activities reviewed and patient expressed appreciation  Discussion of orthopedic goals  Risk, benefits, and merits of treatment alternatives reviewed with the patient and questions answered    Recommendations/Plan:  Exercise, medications, injections, other patient advice, and return appointment as noted.  Patient is encouraged to call or return for any issues or concerns.    Patient agreeable to call or return sooner for any concerns.    I want to start with FL hip injection to see if this helps. If no relief, he will need a lumbar spine work up.       EMR Dragon-transcription disclaimer:  This encounter note is an electronic transcription of spoken language to printed text.  Electronic transcription of spoken language may permit erroneous or at times nonsensical words or phrases to be inadvertently transcribed.  Although I have reviewed the note for such errors, some may still exist

## 2022-09-22 ENCOUNTER — PATIENT ROUNDING (BHMG ONLY) (OUTPATIENT)
Dept: ORTHOPEDIC SURGERY | Facility: CLINIC | Age: 67
End: 2022-09-22

## 2022-09-22 NOTE — PROGRESS NOTES
"September 22, 2022    Hello, may I speak with Senate MARILYNN Capellan?    My name is Radha    I am  with [facility] [address] [address] [phone].    Before we get started may I verify your date of birth? 1955    I am calling to officially welcome you to our practice and ask about your recent visit. Is this a good time to talk? Yes    Tell me about your visit with us. What things went well? \"Appointment went good. Karan is scheduling hip injection.\"     We're always looking for ways to make our patients' experiences even better. Do you have recommendations on ways we may improve? no    Overall were you satisfied with your first visit to our practice? yes        I appreciate you taking the time to speak with me today. Is there anything else I can do for you? no      Thank you, and have a great day.      "

## 2022-10-28 ENCOUNTER — HOSPITAL ENCOUNTER (OUTPATIENT)
Dept: GENERAL RADIOLOGY | Facility: HOSPITAL | Age: 67
Discharge: HOME | End: 2022-10-28
Admitting: PHYSICIAN ASSISTANT

## 2022-10-28 ENCOUNTER — HOSPITAL ENCOUNTER (EMERGENCY)
Facility: HOSPITAL | Age: 67
Discharge: HOME | End: 2022-10-28
Attending: EMERGENCY MEDICINE | Admitting: EMERGENCY MEDICINE

## 2022-10-28 ENCOUNTER — APPOINTMENT (OUTPATIENT)
Dept: CT IMAGING | Facility: HOSPITAL | Age: 67
End: 2022-10-28

## 2022-10-28 VITALS
RESPIRATION RATE: 18 BRPM | TEMPERATURE: 97.8 F | OXYGEN SATURATION: 96 % | WEIGHT: 210 LBS | DIASTOLIC BLOOD PRESSURE: 98 MMHG | BODY MASS INDEX: 30.06 KG/M2 | SYSTOLIC BLOOD PRESSURE: 164 MMHG | HEART RATE: 77 BPM | HEIGHT: 70 IN

## 2022-10-28 DIAGNOSIS — S01.01XA LACERATION OF SCALP, INITIAL ENCOUNTER: ICD-10-CM

## 2022-10-28 DIAGNOSIS — M16.11 PRIMARY OSTEOARTHRITIS OF RIGHT HIP: ICD-10-CM

## 2022-10-28 DIAGNOSIS — S00.03XA CONTUSION OF SCALP, INITIAL ENCOUNTER: Primary | ICD-10-CM

## 2022-10-28 DIAGNOSIS — M25.551 ARTHRALGIA OF RIGHT HIP: ICD-10-CM

## 2022-10-28 PROCEDURE — 77002 NEEDLE LOCALIZATION BY XRAY: CPT

## 2022-10-28 PROCEDURE — 70450 CT HEAD/BRAIN W/O DYE: CPT

## 2022-10-28 PROCEDURE — 0 LIDOCAINE 1 % SOLUTION: Performed by: PHYSICIAN ASSISTANT

## 2022-10-28 PROCEDURE — 20610 DRAIN/INJ JOINT/BURSA W/O US: CPT | Performed by: ORTHOPAEDIC SURGERY

## 2022-10-28 PROCEDURE — 99282 EMERGENCY DEPT VISIT SF MDM: CPT

## 2022-10-28 PROCEDURE — 25010000002 METHYLPREDNISOLONE PER 80 MG: Performed by: PHYSICIAN ASSISTANT

## 2022-10-28 PROCEDURE — 77002 NEEDLE LOCALIZATION BY XRAY: CPT | Performed by: ORTHOPAEDIC SURGERY

## 2022-10-28 RX ORDER — METHYLPREDNISOLONE ACETATE 80 MG/ML
80 INJECTION, SUSPENSION INTRA-ARTICULAR; INTRALESIONAL; INTRAMUSCULAR; SOFT TISSUE ONCE
Status: COMPLETED | OUTPATIENT
Start: 2022-10-28 | End: 2022-10-28

## 2022-10-28 RX ORDER — LIDOCAINE HYDROCHLORIDE 10 MG/ML
5 INJECTION, SOLUTION INFILTRATION; PERINEURAL ONCE
Status: COMPLETED | OUTPATIENT
Start: 2022-10-28 | End: 2022-10-28

## 2022-10-28 RX ADMIN — LIDOCAINE HYDROCHLORIDE 5 ML: 10 INJECTION, SOLUTION INFILTRATION; PERINEURAL at 15:38

## 2022-10-28 RX ADMIN — METHYLPREDNISOLONE ACETATE 80 MG: 80 INJECTION, SUSPENSION INTRA-ARTICULAR; INTRALESIONAL; INTRAMUSCULAR; SOFT TISSUE at 15:42

## 2022-10-28 NOTE — POST-PROCEDURE NOTE
[facility]  [address], [address]  [place] [address]  [phone]        PROCEDURE REPORT        DIAGNOSIS:  Right hip osteoarthritis, symptomatic    PROCEDURE: Right  hip injection under flouroscopy      Yehuda Capellan with date of birth 1955 presents to [facility] Radiology Department today for injection therapy.        Patient presents to [facility] Radiology Department Flouroscopy Suite on 10/28/2022 for planned elective right hip injection under flouroscopy for symptomatic osteoarthritis.    Procedure:     After consent was obtained, and using ethyl chloride topical local anesthetic, the right hip was then prepped and draped with sterile technique. With an anterior hip approach, flouroscopy guidance, and care to stay lateral of the femoral artery, the hip joint was entered via a 20 gauge spinal needle.  A mixture of 80 mg methylprednisolone in one ml plus 5 ml of 1% plain Lidocaine was injected and the needle withdrawn. The procedure was well tolerated and without complication. The patient noted relief of focal hip joint pain.  The patient did remain stable and with baseline ambulation. The patient is asked to rest the joint for a few more days before resuming full regular activities. It may be painful for the first few days. Watch for fever, skin issues, increased swelling or persistent pain in the joint. Call or return to clinic if such symptoms occur, other concerns or if there is lack of improvement as anticipated.    Impression: Symptomatic right hip osteoarthritis.      Recommendations/Plan:      Treatment and patient advice as noted here and in office visit report.  Orthopedic activities reviewed and patient expressed appreciation.  Discussion of orthopedic goals.   Risk, benefits, and merits of treatment options reviewed and questions answered.  Call or notify for any adverse effect from injection therapy.    Exercise: As tolerated.  No strenuous  activity for a few days as appropriate.  Brace:  No brace was given at today's visit  Referral: No referrals made at today's visit  Studies: No additional studies ordered.  Surgery: No surgery proposed at this visit.  Activity:  May perform usual activities as tolerated.      Patient will return to our clinic at scheduled appointment.  Patient agreeable to call or return sooner for any concerns.

## 2022-11-15 ENCOUNTER — OFFICE VISIT (OUTPATIENT)
Dept: INTERNAL MEDICINE | Facility: CLINIC | Age: 67
End: 2022-11-15

## 2022-11-15 VITALS
TEMPERATURE: 97.1 F | SYSTOLIC BLOOD PRESSURE: 122 MMHG | RESPIRATION RATE: 16 BRPM | HEART RATE: 57 BPM | DIASTOLIC BLOOD PRESSURE: 80 MMHG | OXYGEN SATURATION: 97 % | HEIGHT: 70 IN | BODY MASS INDEX: 31.52 KG/M2 | WEIGHT: 220.2 LBS

## 2022-11-15 DIAGNOSIS — L40.9 PSORIASIS: ICD-10-CM

## 2022-11-15 DIAGNOSIS — G63 NEUROPATHY DUE TO MEDICAL CONDITION: ICD-10-CM

## 2022-11-15 DIAGNOSIS — Z23 NEED FOR INFLUENZA VACCINATION: ICD-10-CM

## 2022-11-15 DIAGNOSIS — I48.20 ATRIAL FIBRILLATION, CHRONIC: ICD-10-CM

## 2022-11-15 DIAGNOSIS — I10 ESSENTIAL HYPERTENSION: ICD-10-CM

## 2022-11-15 DIAGNOSIS — E11.65 TYPE 2 DIABETES MELLITUS WITH HYPERGLYCEMIA, WITHOUT LONG-TERM CURRENT USE OF INSULIN: Primary | ICD-10-CM

## 2022-11-15 LAB
EXPIRATION DATE: NORMAL
HBA1C MFR BLD: 7.1 %
Lab: NORMAL

## 2022-11-15 PROCEDURE — 83036 HEMOGLOBIN GLYCOSYLATED A1C: CPT | Performed by: FAMILY MEDICINE

## 2022-11-15 PROCEDURE — 90662 IIV NO PRSV INCREASED AG IM: CPT | Performed by: FAMILY MEDICINE

## 2022-11-15 PROCEDURE — G0008 ADMIN INFLUENZA VIRUS VAC: HCPCS | Performed by: FAMILY MEDICINE

## 2022-11-15 PROCEDURE — 99214 OFFICE O/P EST MOD 30 MIN: CPT | Performed by: FAMILY MEDICINE

## 2022-11-15 RX ORDER — TRIAMCINOLONE ACETONIDE 1 MG/G
1 CREAM TOPICAL 2 TIMES DAILY
Qty: 80 G | Refills: 3 | Status: SHIPPED | OUTPATIENT
Start: 2022-11-15

## 2022-11-15 RX ORDER — METOPROLOL TARTRATE 50 MG/1
50 TABLET, FILM COATED ORAL 2 TIMES DAILY
Qty: 180 TABLET | Refills: 3 | Status: SHIPPED | OUTPATIENT
Start: 2022-11-15

## 2022-11-15 RX ORDER — ATORVASTATIN CALCIUM 20 MG/1
20 TABLET, FILM COATED ORAL NIGHTLY
Qty: 90 TABLET | Refills: 3 | Status: SHIPPED | OUTPATIENT
Start: 2022-11-15

## 2022-11-15 RX ORDER — GABAPENTIN 300 MG/1
300 CAPSULE ORAL 4 TIMES DAILY PRN
Qty: 360 CAPSULE | Refills: 1 | Status: SHIPPED | OUTPATIENT
Start: 2022-11-15

## 2022-11-15 RX ORDER — METFORMIN HYDROCHLORIDE 500 MG/1
1000 TABLET, EXTENDED RELEASE ORAL
Qty: 180 TABLET | Refills: 3 | Status: SHIPPED | OUTPATIENT
Start: 2022-11-15 | End: 2023-02-21 | Stop reason: SDUPTHER

## 2022-11-15 NOTE — PROGRESS NOTES
"Chief Complaint  Diabetes (3 month follow up)    Subjective        Senjanae Capellan presents to [facility] PRIMARY [facility]  Diabetes  He presents for his follow-up diabetic visit. He has type 2 diabetes mellitus. 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   Psoriasis  This is a chronic problem. The problem has been waxing and waning since onset. The affected locations include the left arm and right arm. Exacerbated by: stress.       Objective   Vital Signs:  /80 (BP Location: Right arm, Patient Position: Sitting, Cuff Size: Adult)   Pulse 57   Temp 97.1 °F (36.2 °C) (Temporal)   Resp 16   Ht 177.8 cm (70\")   Wt 99.9 kg (220 lb 3.2 oz)   SpO2 97%   BMI 31.60 kg/m²   Estimated body mass index is 31.6 kg/m² as calculated from the following:    Height as of this encounter: 177.8 cm (70\").    Weight as of this encounter: 99.9 kg (220 lb 3.2 oz).          Physical Exam  Vitals and nursing note reviewed.   Constitutional:       General: He is not in acute distress.     Appearance: Normal appearance. He is well-developed and well-groomed. He is obese. He is not ill-appearing, toxic-appearing or diaphoretic.      Interventions: Face mask in place.   HENT:      Head: Normocephalic and atraumatic.      Right Ear: Hearing normal.      Left Ear: Hearing normal.   Eyes:      General: Lids are normal. No scleral icterus.        Right eye: No discharge.         Left eye: No discharge.      Extraocular Movements: Extraocular movements intact.   Pulmonary:      Effort: Pulmonary effort is normal.   Musculoskeletal:      Cervical back: Neck supple.   Skin:     Coloration: Skin is not jaundiced or pale.      Findings: Rash (erythematous scaled plaques on forearms) present.   Neurological:      General: No focal deficit present.      Mental Status: He is alert and oriented to person, place, and time.   Psychiatric:         Attention and Perception: " Attention and perception normal.         Mood and Affect: Mood and affect normal.         Speech: Speech normal.         Behavior: Behavior normal. Behavior is cooperative.         Thought Content: Thought content normal.         Cognition and Memory: Cognition and memory normal.         Judgment: Judgment normal.        Result Review :  The following data was reviewed by: Cindy Zhang MD on 11/15/2022:  Lab Results   Component Value Date    HGBA1C 7.1 11/15/2022    HGBA1C 6.6 (H) 08/15/2022    HGBA1C 6.3 05/06/2022                Assessment and Plan   Diagnoses and all orders for this visit:    1. Type 2 diabetes mellitus with hyperglycemia, without long-term current use of insulin ([facility]) (Primary)  -     POC Glycosylated Hemoglobin (Hb A1C)  -     atorvastatin (LIPITOR) 20 MG tablet; Take 1 tablet by mouth Every Night. To lower cholesterol and risk of stroke.  Dispense: 90 tablet; Refill: 3  -     metFORMIN ER (GLUCOPHAGE-XR) 500 MG 24 hr tablet; Take 2 tablets by mouth Daily With Breakfast.  Dispense: 180 tablet; Refill: 3    2. Atrial fibrillation, chronic ([facility])  -     metoprolol tartrate (LOPRESSOR) 50 MG tablet; Take 1 tablet by mouth 2 (Two) Times a Day.  Dispense: 180 tablet; Refill: 3    3. Essential hypertension  Assessment & Plan:  Hypertension is improving with treatment.  Continue current medications.  Blood pressure will be reassessed at the next regular appointment.    Orders:  -     metoprolol tartrate (LOPRESSOR) 50 MG tablet; Take 1 tablet by mouth 2 (Two) Times a Day.  Dispense: 180 tablet; Refill: 3    4. Neuropathy due to medical condition ([facility])  Comments:  diabetes  Orders:  -     gabapentin (NEURONTIN) 300 MG capsule; Take 1 capsule by mouth 4 (Four) Times a Day As Needed (nerve pain).  Dispense: 360 capsule; Refill: 1    5. Psoriasis  -     triamcinolone (KENALOG) 0.1 % cream; Apply 1 application topically to the appropriate area as directed 2 (Two) Times a Day.  Dispense: 80 g;  Refill: 3    6. Need for influenza vaccination  -     Fluzone High-Dose 65+yrs ([id number])    ROBERTO reviewed and appropriate.         Follow Up   Return in about 3 months (around 2/18/2023) for Diabetes follow up.  Patient was given instructions and counseling regarding his condition or for health maintenance advice. Please see specific information pulled into the AVS if appropriate.

## 2022-11-21 ENCOUNTER — OFFICE VISIT (OUTPATIENT)
Dept: ENDOCRINOLOGY | Facility: CLINIC | Age: 67
End: 2022-11-21

## 2022-11-21 VITALS
OXYGEN SATURATION: 98 % | SYSTOLIC BLOOD PRESSURE: 138 MMHG | HEART RATE: 66 BPM | BODY MASS INDEX: 31.5 KG/M2 | HEIGHT: 70 IN | DIASTOLIC BLOOD PRESSURE: 80 MMHG | WEIGHT: 220 LBS

## 2022-11-21 DIAGNOSIS — E04.2 MULTINODULAR GOITER (NONTOXIC): Primary | ICD-10-CM

## 2022-11-21 PROCEDURE — 99213 OFFICE O/P EST LOW 20 MIN: CPT | Performed by: INTERNAL MEDICINE

## 2022-11-21 PROCEDURE — 76536 US EXAM OF HEAD AND NECK: CPT | Performed by: INTERNAL MEDICINE

## 2022-12-27 RX ORDER — GLIMEPIRIDE 2 MG/1
TABLET ORAL
Qty: 90 TABLET | Refills: 3 | Status: SHIPPED | OUTPATIENT
Start: 2022-12-27 | End: 2023-01-12 | Stop reason: SDUPTHER

## 2022-12-27 RX ORDER — LOSARTAN POTASSIUM AND HYDROCHLOROTHIAZIDE 12.5; 1 MG/1; MG/1
TABLET ORAL
Qty: 90 TABLET | Refills: 3 | Status: SHIPPED | OUTPATIENT
Start: 2022-12-27 | End: 2023-02-21 | Stop reason: SDUPTHER

## 2022-12-27 RX ORDER — ISOPROPYL ALCOHOL 0.75 G/1
SWAB TOPICAL
Qty: 300 EACH | Refills: 3 | Status: SHIPPED | OUTPATIENT
Start: 2022-12-27

## 2022-12-27 RX ORDER — LANCETS
EACH MISCELLANEOUS
Qty: 200 EACH | Refills: 3 | Status: SHIPPED | OUTPATIENT
Start: 2022-12-27

## 2022-12-27 NOTE — TELEPHONE ENCOUNTER
Rx Refill Note  Requested Prescriptions     Pending Prescriptions Disp Refills   • glimepiride (AMARYL) 2 MG tablet [Pharmacy Med Name: GLIMEPIRIDE  TAB 2MG] 90 tablet 3     Sig: TAKE 1 TABLET EVERY MORNINGBEFORE BREAKFAST   • losartan-hydrochlorothiazide (HYZAAR) 100-12.5 MG per tablet [Pharmacy Med Name: LOSARTAN/HCT -12.5] 90 tablet 3     Sig: TAKE 1 TABLET DAILY   • metFORMIN (GLUCOPHAGE) 1000 MG tablet [Pharmacy Med Name: METFORMIN TAB 1000MG] 180 tablet 3     Sig: TAKE 1 TABLET TWICE DAILY  WITH MEALS   • Alcohol Swabs (B-D SINGLE USE SWABS REGULAR) pads [Pharmacy Med Name: BD ALCOHOL MIS SWABS] 300 each 3     Sig: USE 3 TIMES A DAY   • Accu-Chek Softclix Lancets lancets [Pharmacy Med Name: ACCU-CHEK LNC SOFTCLIX] 200 each 3     Sig: TEST TWO TIMES A DAY FOR   DIABETES MELLITUS      Last office visit with prescribing clinician: 11/15/2022   Last telemedicine visit with prescribing clinician: 2/21/2023   Next office visit with prescribing clinician: 2/21/2023                         Would you like a call back once the refill request has been completed: [] Yes [] No    If the office needs to give you a call back, can they leave a voicemail: [] Yes [] No    Aspen Mandujano LPN  12/27/22, 09:35 EST

## 2023-01-11 ENCOUNTER — TELEPHONE (OUTPATIENT)
Dept: INTERNAL MEDICINE | Facility: CLINIC | Age: 68
End: 2023-01-11
Payer: MEDICARE

## 2023-01-11 NOTE — TELEPHONE ENCOUNTER
Caller: Sydnee Capellan    Relationship: Emergency Contact    Best call back number:   [phone]    Who are you requesting to speak with (clinical staff, provider,  specific staff member):  CLINICAL     What was the call regarding: PATIENT IS CONCERNED BECAUSE HIS SUGAR HAS BEEN RUNNING AROUND 250   HIS APPOINTMENT IS SCHEDULED FOR 2-21-23 BUT WOULD LIKE TO MOVE IT UP SOONER IF POSSIBLE     Do you require a callback:  PLEASE CALL AND ADVISE

## 2023-01-12 RX ORDER — GLIMEPIRIDE 1 MG/1
3 TABLET ORAL
Qty: 90 TABLET | Refills: 1 | Status: SHIPPED | OUTPATIENT
Start: 2023-01-12 | End: 2023-02-21 | Stop reason: SDUPTHER

## 2023-01-12 NOTE — TELEPHONE ENCOUNTER
Called pt, verbally informed. He stated he would go  the new prescription and take it as prescribed and keep us updated on his sugar numbers.

## 2023-01-26 ENCOUNTER — TELEPHONE (OUTPATIENT)
Dept: ORTHOPEDIC SURGERY | Facility: CLINIC | Age: 68
End: 2023-01-26
Payer: MEDICARE

## 2023-01-26 NOTE — TELEPHONE ENCOUNTER
Caller: XIAO ANDREWS    Relationship to patient: SELF    Best call back number:  [phone]    Chief complaint: PATIENT REQUESTING APPT. FOR RIGHT HIP INJECTION.    Type of visit:  F/U/INJ    Requested date:  AS SOON AS CAN

## 2023-01-26 NOTE — TELEPHONE ENCOUNTER
Patient would like to schedule hip fluoro injection. I spoke with him, and advised that I do not have February schedule yet, as soon as I get it I will call him and get him on the schedule.

## 2023-01-30 ENCOUNTER — TELEPHONE (OUTPATIENT)
Dept: ORTHOPEDIC SURGERY | Facility: CLINIC | Age: 68
End: 2023-01-30
Payer: MEDICARE

## 2023-01-30 DIAGNOSIS — M25.551 ARTHRALGIA OF RIGHT HIP: Primary | ICD-10-CM

## 2023-01-30 DIAGNOSIS — M16.11 PRIMARY OSTEOARTHRITIS OF RIGHT HIP: ICD-10-CM

## 2023-01-30 NOTE — TELEPHONE ENCOUNTER
Called patient to let him know about hip fluoro injection scheduled for 2/17/23 @ 1:00 pm. Left message for him to return call.

## 2023-02-17 ENCOUNTER — HOSPITAL ENCOUNTER (OUTPATIENT)
Dept: GENERAL RADIOLOGY | Facility: HOSPITAL | Age: 68
Discharge: HOME | End: 2023-02-17
Admitting: PHYSICIAN ASSISTANT
Payer: MEDICARE

## 2023-02-17 PROCEDURE — 0 LIDOCAINE 1 % SOLUTION: Performed by: PHYSICIAN ASSISTANT

## 2023-02-17 PROCEDURE — 77002 NEEDLE LOCALIZATION BY XRAY: CPT

## 2023-02-17 PROCEDURE — 77002 NEEDLE LOCALIZATION BY XRAY: CPT | Performed by: ORTHOPAEDIC SURGERY

## 2023-02-17 PROCEDURE — 20610 DRAIN/INJ JOINT/BURSA W/O US: CPT | Performed by: ORTHOPAEDIC SURGERY

## 2023-02-17 PROCEDURE — 25010000002 METHYLPREDNISOLONE PER 80 MG: Performed by: PHYSICIAN ASSISTANT

## 2023-02-17 RX ORDER — METHYLPREDNISOLONE ACETATE 80 MG/ML
80 INJECTION, SUSPENSION INTRA-ARTICULAR; INTRALESIONAL; INTRAMUSCULAR; SOFT TISSUE ONCE
Status: COMPLETED | OUTPATIENT
Start: 2023-02-17 | End: 2023-02-17

## 2023-02-17 RX ORDER — LIDOCAINE HYDROCHLORIDE 10 MG/ML
5 INJECTION, SOLUTION INFILTRATION; PERINEURAL ONCE
Status: COMPLETED | OUTPATIENT
Start: 2023-02-17 | End: 2023-02-17

## 2023-02-17 RX ADMIN — METHYLPREDNISOLONE ACETATE 80 MG: 80 INJECTION, SUSPENSION INTRA-ARTICULAR; INTRALESIONAL; INTRAMUSCULAR; SOFT TISSUE at 13:26

## 2023-02-17 RX ADMIN — LIDOCAINE HYDROCHLORIDE 5 ML: 10 INJECTION, SOLUTION INFILTRATION; PERINEURAL at 13:25

## 2023-02-18 NOTE — POST-PROCEDURE NOTE
[facility]  [address], [address]  [place] [address]  [phone]        PROCEDURE REPORT        DIAGNOSIS:  Right hip osteoarthritis, symptomatic    PROCEDURE: Right  hip injection under flouroscopy      Yehuda Capellan with date of birth 1955 presents to [facility] Radiology Department today for injection therapy.        Patient presents to [facility] Radiology Department Flouroscopy Suite on 2/17/2023 for planned elective right hip injection under flouroscopy for symptomatic osteoarthritis.    Procedure:     After consent was obtained, and using ethyl chloride topical local anesthetic, the right hip was then prepped and draped with sterile technique. With an anterior hip approach, flouroscopy guidance, and care to stay lateral of the femoral artery, the hip joint was entered via a 20 gauge spinal needle.  A mixture of 80 mg methylprednisolone in one ml plus 5 ml of 1% plain Lidocaine was injected and the needle withdrawn and a band aid applied. The procedure was well tolerated and without complication.  The patient did remain stable and with baseline ambulation. The patient is asked to avoid stressful physical activity for a day or two before resuming full regular activities.  There might be some soreness initially and patient to call for any adverse effect of the injection treatment.  Call or return to clinic if any fever, swelling, persistent pain, lack of anticipated improvement or other symptoms or concerns.    Impression: Symptomatic right hip osteoarthritis.      Recommendations/Plan:      Treatment and patient advice as noted here and in office visit report.  Orthopedic activities and goals reviewed and patient expressed appreciation.  Call or notify for any adverse effect from injection therapy.    Exercise: As tolerated.  No strenuous activity for a few days as appropriate.  Activity:  May perform usual activities as tolerated.      Patient will return  to our clinic at scheduled appointment.  Patient agreeable to call or return sooner for any concerns.

## 2023-02-21 ENCOUNTER — OFFICE VISIT (OUTPATIENT)
Dept: INTERNAL MEDICINE | Facility: CLINIC | Age: 68
End: 2023-02-21
Payer: MEDICARE

## 2023-02-21 VITALS
TEMPERATURE: 97 F | BODY MASS INDEX: 32.21 KG/M2 | RESPIRATION RATE: 16 BRPM | OXYGEN SATURATION: 97 % | WEIGHT: 225 LBS | DIASTOLIC BLOOD PRESSURE: 80 MMHG | HEIGHT: 70 IN | HEART RATE: 79 BPM | SYSTOLIC BLOOD PRESSURE: 124 MMHG

## 2023-02-21 DIAGNOSIS — I10 ESSENTIAL HYPERTENSION: ICD-10-CM

## 2023-02-21 DIAGNOSIS — Z91.81 AT MODERATE RISK FOR FALL: ICD-10-CM

## 2023-02-21 DIAGNOSIS — E11.65 TYPE 2 DIABETES MELLITUS WITH HYPERGLYCEMIA, WITHOUT LONG-TERM CURRENT USE OF INSULIN: Primary | ICD-10-CM

## 2023-02-21 DIAGNOSIS — L98.9 FACIAL LESION: ICD-10-CM

## 2023-02-21 DIAGNOSIS — C44.90 SKIN CANCER: ICD-10-CM

## 2023-02-21 LAB
EXPIRATION DATE: NORMAL
HBA1C MFR BLD: 8 %
Lab: NORMAL

## 2023-02-21 PROCEDURE — 99214 OFFICE O/P EST MOD 30 MIN: CPT | Performed by: FAMILY MEDICINE

## 2023-02-21 PROCEDURE — 3052F HG A1C>EQUAL 8.0%<EQUAL 9.0%: CPT | Performed by: FAMILY MEDICINE

## 2023-02-21 PROCEDURE — 83036 HEMOGLOBIN GLYCOSYLATED A1C: CPT | Performed by: FAMILY MEDICINE

## 2023-02-21 RX ORDER — GLIMEPIRIDE 1 MG/1
3 TABLET ORAL
Qty: 270 TABLET | Refills: 3 | Status: SHIPPED | OUTPATIENT
Start: 2023-02-21

## 2023-02-21 RX ORDER — METFORMIN HYDROCHLORIDE 500 MG/1
1000 TABLET, EXTENDED RELEASE ORAL 2 TIMES DAILY
Qty: 360 TABLET | Refills: 3 | Status: SHIPPED | OUTPATIENT
Start: 2023-02-21

## 2023-02-21 RX ORDER — LOSARTAN POTASSIUM AND HYDROCHLOROTHIAZIDE 12.5; 1 MG/1; MG/1
1 TABLET ORAL DAILY
Qty: 90 TABLET | Refills: 3 | Status: SHIPPED | OUTPATIENT
Start: 2023-02-21

## 2023-02-21 NOTE — PROGRESS NOTES
"Chief Complaint  Diabetes (3 month follow up)    Subjective        Senate MARILYNN Capellan presents to [facility]  History of Present Illness  Sugars running higher since his leg injury, increased pain.   When he went up on med as directed (January phone call) his sugars came down some.     Lesion on left side of face, irritated, flaky. He's used steroid on it and it calms down but hasn't gone away.      Objective   Vital Signs:  /80 (BP Location: Right arm, Patient Position: Sitting, Cuff Size: Adult)   Pulse 79   Temp 97 °F (36.1 °C) (Temporal)   Resp 16   Ht 177.8 cm (70\")   Wt 102 kg (225 lb)   SpO2 97%   BMI 32.28 kg/m²   Estimated body mass index is 32.28 kg/m² as calculated from the following:    Height as of this encounter: 177.8 cm (70\").    Weight as of this encounter: 102 kg (225 lb).             Physical Exam  Vitals and nursing note reviewed.   Constitutional:       General: He is not in acute distress.     Appearance: Normal appearance. He is well-developed and well-groomed. He is not ill-appearing, toxic-appearing or diaphoretic.      Interventions: Face mask in place.   HENT:      Head: Normocephalic and atraumatic.        Right Ear: Hearing normal.      Left Ear: Hearing normal.   Eyes:      General: Lids are normal. No scleral icterus.        Right eye: No discharge.         Left eye: No discharge.      Extraocular Movements: Extraocular movements intact.   Cardiovascular:      Rate and Rhythm: Normal rate and regular rhythm.   Pulmonary:      Effort: Pulmonary effort is normal.   Musculoskeletal:      Cervical back: Neck supple.   Skin:     Coloration: Skin is not jaundiced or pale.   Neurological:      General: No focal deficit present.      Mental Status: He is alert and oriented to person, place, and time.   Psychiatric:         Attention and Perception: Attention and perception normal.         Mood and Affect: Mood and affect normal.         Speech: Speech " normal.         Behavior: Behavior normal. Behavior is cooperative.         Thought Content: Thought content normal.         Cognition and Memory: Cognition and memory normal.         Judgment: Judgment normal.        Result Review :  Lab Results   Component Value Date    HGBA1C 8.0 02/21/2023    HGBA1C 7.1 11/15/2022    HGBA1C 6.6 (H) 08/15/2022                     Assessment and Plan   Diagnoses and all orders for this visit:    1. Type 2 diabetes mellitus with hyperglycemia, without long-term current use of insulin ([facility]) (Primary)  -     POC Glycosylated Hemoglobin (Hb A1C)  -     glimepiride (AMARYL) 1 MG tablet; Take 3 tablets by mouth Every Morning Before Breakfast.  Dispense: 270 tablet; Refill: 3  -     metFORMIN ER (GLUCOPHAGE-XR) 500 MG 24 hr tablet; Take 2 tablets by mouth 2 (Two) Times a Day.  Dispense: 360 tablet; Refill: 3    2. Facial lesion  Comments:  no further steroid to face emphasized  Orders:  -     Ambulatory Referral to Dermatology    3. Skin cancer  -     Ambulatory Referral to Dermatology    4. Essential hypertension  -     losartan-hydrochlorothiazide (HYZAAR) 100-12.5 MG per tablet; Take 1 tablet by mouth Daily. Indications: High Blood Pressure Disorder  Dispense: 90 tablet; Refill: 3    5. At moderate risk for fall  Comments:  info via avs    increased metformin ER to max dose 2 pills bid. Refilled both diabetes mellitus meds and sent to mail order. No issues with diarrhea since metformin was changed to ER.         Follow Up   Return in about 3 months (around 5/27/2023) for Diabetes follow up.  Patient was given instructions and counseling regarding his condition or for health maintenance advice. Please see specific information pulled into the AVS if appropriate.

## 2023-02-21 NOTE — PATIENT INSTRUCTIONS
Fall Prevention in the Home, Adult  Falls can cause injuries and affect people of all ages. There are many simple things that you can do to make your home safe and to help prevent falls. Ask for help when making these changes, if needed.  What actions can I take to prevent falls?  General instructions  • Use good lighting in all rooms. Replace any light bulbs that burn out, turn on lights if it is dark, and use night-lights.  • Place frequently used items in easy-to-reach places. Lower the shelves around your home if necessary.  • Set up furniture so that there are clear paths around it. Avoid moving your furniture around.  • Remove throw rugs and other tripping hazards from the floor.  • Avoid walking on wet floors.  • Fix any uneven floor surfaces.  • Add color or contrast paint or tape to grab bars and handrails in your home. Place contrasting color strips on the first and last steps of staircases.  • When you use a stepladder, make sure that it is completely opened and that the sides and supports are firmly locked. Have someone hold the ladder while you are using it. Do not climb a closed stepladder.  • Know where your pets are when moving through your home.  What can I do in the bathroom?     • Keep the floor dry. Immediately clean up any water that is on the floor.  • Remove soap buildup in the tub or shower regularly.  • Use nonskid mats or decals on the floor of the tub or shower.  • Attach bath mats securely with double-sided, nonslip rug tape.  • If you need to sit down while you are in the shower, use a plastic, nonslip stool.  • Install grab bars by the toilet and in the tub and shower. Do not use towel bars as grab bars.  What can I do in the bedroom?  • Make sure that a bedside light is easy to reach.  • Do not use oversized bedding that reaches the floor.  • Have a firm chair that has side arms to use for getting dressed.  What can I do in the kitchen?  • Clean up any spills right away.  • If you  need to reach for something above you, use a sturdy step stool that has a grab bar.  • Keep electrical cables out of the way.  • Do not use floor polish or wax that makes floors slippery. If you must use wax, make sure that it is non-skid floor wax.  What can I do with my stairs?  • Do not leave any items on the stairs.  • Make sure that you have a light switch at the top and the bottom of the stairs. Have them installed if you do not have them.  • Make sure that there are handrails on both sides of the stairs. Fix handrails that are broken or loose. Make sure that handrails are as long as the staircases.  • Install non-slip stair treads on all stairs in your home.  • Avoid having throw rugs at the top or bottom of stairs, or secure the rugs with carpet tape to prevent them from moving.  • Choose a carpet design that does not hide the edge of steps on the stairs.  • Check any carpeting to make sure that it is firmly attached to the stairs. Fix any carpet that is loose or worn.  What can I do on the outside of my home?  • Use bright outdoor lighting.  • Regularly repair the edges of walkways and driveways and fix any cracks.  • Remove high doorway thresholds.  • Trim any shrubbery on the main path into your home.  • Regularly check that handrails are securely fastened and in good repair. Both sides of all steps should have handrails.  • Install guardrails along the edges of any raised decks or porches.  • Clear walkways of debris and clutter, including tools and rocks.  • Have leaves, snow, and ice cleared regularly.  • Use sand or salt on walkways during winter months.  • In the garage, clean up any spills right away, including grease or oil spills.  What other actions can I take?  • Wear closed-toe shoes that fit well and support your feet. Wear shoes that have rubber soles or low heels.  • Use mobility aids as needed, such as canes, walkers, scooters, and crutches.  • Review your medicines with your health care  provider. Some medicines can cause dizziness or changes in blood pressure, which increase your risk of falling.  Talk with your health care provider about other ways that you can decrease your risk of falls. This may include working with a physical therapist or  to improve your strength, balance, and endurance.  Where to find more information  • Centers for Disease Control and Prevention, [place]: www.cdc.gov  • National [place] on Aging: www.[place].nih.gov  Contact a health care provider if:  • You are afraid of falling at home.  • You feel weak, drowsy, or dizzy at home.  • You fall at home.  Summary  • There are many simple things that you can do to make your home safe and to help prevent falls.  • Ways to make your home safe include removing tripping hazards and installing grab bars in the bathroom.  • Ask for help when making these changes in your home.  This information is not intended to replace advice given to you by your health care provider. Make sure you discuss any questions you have with your health care provider.  Document Revised: 09/19/2022 Document Reviewed: 07/21/2021  [employer] Patient Education © 2022 [employer]      Sit-to-Stand Exercise  The sit-to-stand exercise (also known as the chair stand or chair rise exercise) strengthens your lower body and helps you maintain or improve your mobility and independence. The end goal is to do the sit-to-stand exercise without using your hands. This will be easier as you become stronger. You should always talk with your health care provider before starting any exercise program, especially if you have had recent surgery.  Do the exercise exactly as told by your health care provider and adjust it as directed. It is normal to feel mild stretching, pulling, tightness, or discomfort as you do this exercise, but you should stop right away if you feel sudden pain or your pain gets worse. Do not begin doing this exercise until told by your health care  provider.  What the sit-to-stand exercise does  The sit-to-stand exercise helps to strengthen the muscles in your thighs and the muscles in the center of your body that give you stability (core muscles). This exercise is especially helpful if:  • You have had knee or hip surgery.  • You have trouble getting up from a chair, out of a car, or off the toilet due to muscle weakness.  How to do the sit-to-stand exercise  1. Sit toward the front edge of a sturdy chair without armrests. Your knees should be bent and your feet should be flat on the floor and shoulder-width apart and underneath your hips.  2. Place your hands lightly on each side of the seat. Keep your back and neck as straight as possible, with your chest slightly forward.  3. Breathe in slowly. Lean forward and slightly shift your weight to the front of your feet.  4. Breathe out as you slowly stand up. Try not to support any weight with your hands.  5. Stand and pause for a full breath in and out.  6. Breathe in as you sit down slowly. Tighten your core and abdominal muscles to control your lowering as much as possible. You should lower yourself back to the chair slowly, not just drop back into the seat.  7. Breathe out slowly.  8. Do this exercise 10-15 times. If needed, do it fewer times until you build up strength.  9. Rest for 1 minute, then do another set of 10-15 repetitions.  To change the difficulty of the sit-to-stand exercise  • If the exercise is too difficult, use a chair with sturdy armrests, and push off the armrests to help you come to the standing position. You can also use the armrests to help slowly lower yourself back to sitting. As this gets easier, try to use your arms less. You can also place a firm cushion or pillow on the chair to make the surface higher.  • If this exercise is too easy, do not use your arms to help raise or lower yourself. You can also wear a weighted vest, use hand weights, increase your repetitions, or try a  lower chair.  General tips  • You may feel tired when starting an exercise routine. This is normal.  • You may have muscle soreness that lasts a few days. This is normal. As you get stronger, you may not feel muscle soreness.  • Use smooth, steady movements.  • Do not  hold your breath during strength exercises. This can cause unsafe changes in your blood pressure.  • Breathe in slowly through your nose, and breathe out slowly through your mouth.  Summary  • Strengthening your lower body is an important step to help you move safely and independently.  • The sit-to-stand exercise helps strengthen the muscles in your thighs and core.  • You should always talk with your health care provider before starting any exercise program, especially if you have had recent surgery.  This information is not intended to replace advice given to you by your health care provider. Make sure you discuss any questions you have with your health care provider.  Document Revised: 04/10/2022 Document Reviewed: 04/10/2022  [employer] Patient Education © 2022 [employer]      Exercising to Stay Healthy  To become healthy and stay healthy, it is recommended that you do moderate-intensity and vigorous-intensity exercise. You can tell that you are exercising at a moderate intensity if your heart starts beating faster and you start breathing faster but can still hold a conversation. You can tell that you are exercising at a vigorous intensity if you are breathing much harder and faster and cannot hold a conversation while exercising.  How can exercise benefit me?  Exercising regularly is important. It has many health benefits, such as:  • Improving overall fitness, flexibility, and endurance.  • Increasing bone density.  • Helping with weight control.  • Decreasing body fat.  • Increasing muscle strength and endurance.  • Reducing stress and tension, anxiety, depression, or anger.  • Improving overall health.  What guidelines should I follow while  exercising?  • Before you start a new exercise program, talk with your health care provider.  • Do not exercise so much that you hurt yourself, feel dizzy, or get very short of breath.  • Wear comfortable clothes and wear shoes with good support.  • Drink plenty of water while you exercise to prevent dehydration or heat stroke.  • Work out until your breathing and your heartbeat get faster (moderate intensity).  How often should I exercise?  Choose an activity that you enjoy, and set realistic goals. Your health care provider can help you make an activity plan that is individually designed and works best for you.  Exercise regularly as told by your health care provider. This may include:  • Doing strength training two times a week, such as:  ? Lifting weights.  ? Using resistance bands.  ? Push-ups.  ? Sit-ups.  ? Yoga.  • Doing a certain intensity of exercise for a given amount of time. Choose from these options:  ? A total of 150 minutes of moderate-intensity exercise every week.  ? A total of 75 minutes of vigorous-intensity exercise every week.  ? A mix of moderate-intensity and vigorous-intensity exercise every week.  Children, pregnant women, people who have not exercised regularly, people who are overweight, and older adults may need to talk with a health care provider about what activities are safe to perform. If you have a medical condition, be sure to talk with your health care provider before you start a new exercise program.  What are some exercise ideas?  Moderate-intensity exercise ideas include:  • Walking 1 mile (1.6 km) in about 15 minutes.  • Biking.  • Hiking.  • Golfing.  • Dancing.  • Water aerobics.  Vigorous-intensity exercise ideas include:  • Walking 4.5 miles (7.2 km) or more in about 1 hour.  • Jogging or running 5 miles (8 km) in about 1 hour.  • Biking 10 miles (16.1 km) or more in about 1 hour.  • Lap swimming.  • Roller-skating or in-line skating.  • Cross-country skiing.  • Vigorous  competitive sports, such as football, basketball, and soccer.  • Jumping rope.  • Aerobic dancing.  What are some everyday activities that can help me get exercise?  • Yard work, such as:  ? Pushing a .  ? Raking and bagging leaves.  • Washing your car.  • Pushing a stroller.  • Shoveling snow.  • Gardening.  • Washing windows or floors.  How can I be more active in my day-to-day activities?  • Use stairs instead of an elevator.  • Take a walk during your lunch break.  • If you drive, park your car farther away from your work or school.  • If you take public transportation, get off one stop early and walk the rest of the way.  • Stand up or walk around during all of your indoor phone calls.  • Get up, stretch, and walk around every 30 minutes throughout the day.  • Enjoy exercise with a friend. Support to continue exercising will help you keep a regular routine of activity.  Where to find more information  You can find more information about exercising to stay healthy from:  • U.S[place] Department of Health and Human Services: www.hhs.gov  • Centers for Disease Control and Prevention (CDC): www.cdc.gov  Summary  • Exercising regularly is important. It will improve your overall fitness, flexibility, and endurance.  • Regular exercise will also improve your overall health. It can help you control your weight, reduce stress, and improve your bone density.  • Do not exercise so much that you hurt yourself, feel dizzy, or get very short of breath.  • Before you start a new exercise program, talk with your health care provider.  This information is not intended to replace advice given to you by your health care provider. Make sure you discuss any questions you have with your health care provider.  Document Revised: 04/15/2022 Document Reviewed: 04/15/2022  [employer] Patient Education © 2022 [employer].

## 2023-02-24 ENCOUNTER — TELEPHONE (OUTPATIENT)
Dept: ORTHOPEDIC SURGERY | Facility: CLINIC | Age: 68
End: 2023-02-24
Payer: MEDICARE

## 2023-02-24 NOTE — TELEPHONE ENCOUNTER
Patient would like appt to discuss GINNY vs other treatment options. Scheduled appt with Karan on 3/1/23.

## 2023-02-24 NOTE — TELEPHONE ENCOUNTER
It could be that the arthritis has advanced and the cortisone is obviously not effective. He could consider GINNY vs. Pain mgt referral

## 2023-02-24 NOTE — TELEPHONE ENCOUNTER
Spoke with patient, he states pain in hip is just as bad or worse than before injection. He states the injection did not relieve pain at all. He states last injection in October  relieved pain for about 4 months.

## 2023-02-24 NOTE — TELEPHONE ENCOUNTER
Caller: Yehuda Capellan    Relationship: Self    Best call back number:     What is the best time to reach you: ANY     Who are you requesting to speak with (clinical staff, provider,  specific staff member): CLINICAL    What was the call regarding: PATIENT RECEIVED INJECTION ON 2/17/23 AND IS SAYING HES IN WORSE PAIN THAN BEFORE IN HIS KNEES    Do you require a callback: YES

## 2023-03-01 ENCOUNTER — OFFICE VISIT (OUTPATIENT)
Dept: ORTHOPEDIC SURGERY | Facility: CLINIC | Age: 68
End: 2023-03-01
Payer: MEDICARE

## 2023-03-01 VITALS
WEIGHT: 216 LBS | SYSTOLIC BLOOD PRESSURE: 130 MMHG | DIASTOLIC BLOOD PRESSURE: 78 MMHG | HEIGHT: 70 IN | BODY MASS INDEX: 30.92 KG/M2

## 2023-03-01 DIAGNOSIS — M16.11 PRIMARY OSTEOARTHRITIS OF RIGHT HIP: Primary | ICD-10-CM

## 2023-03-01 DIAGNOSIS — M25.551 ARTHRALGIA OF RIGHT HIP: ICD-10-CM

## 2023-03-01 PROCEDURE — 99213 OFFICE O/P EST LOW 20 MIN: CPT | Performed by: PHYSICIAN ASSISTANT

## 2023-03-01 RX ORDER — MELOXICAM 7.5 MG/1
7.5 TABLET ORAL DAILY
Qty: 14 TABLET | Refills: 0 | Status: SHIPPED | OUTPATIENT
Start: 2023-03-01

## 2023-03-01 RX ORDER — LIDOCAINE 50 MG/G
1 PATCH TOPICAL DAILY
Qty: 30 PATCH | Refills: 0 | Status: SHIPPED | OUTPATIENT
Start: 2023-03-01

## 2023-03-01 NOTE — PROGRESS NOTES
Subjective   Patient ID: Yehuda Capellan is a 67 y.o. right hand dominant male  Follow-up of the Right Hip (States the injection he had 23 did not help, he thinks his hip is worse after the injection. He would like to discuss other options. )         History of Present Illness  Patient would like to discuss additional treatment options for chronic right hip pain. He states the last [place] hip injection did not provide relief. He uses a cane.  Patient has experienced right hip pain since age 17 ( He had a hip fracture that was treated and healed nonsurgically.    Past Medical History:   Diagnosis Date   • Atrial fibrillation (HCC)    • Elevated glucose 2017    A1C 6.7   • Hip fracture (HCC)     right, at age 17   • Hyperlipidemia    • Hypertension         Past Surgical History:   Procedure Laterality Date   • HAND SURGERY Left     LEFT PINKY SEWN BACK ON in [place] , cut off with table saw       Family History   Problem Relation Age of Onset   • Cancer Father        Social History     Socioeconomic History   • Marital status:    Tobacco Use   • Smoking status: Former     Packs/day: 3.00     Years: 20.00     Pack years: 60.00     Types: Cigarettes     Quit date:      Years since quittin.1   • Smokeless tobacco: Never   Vaping Use   • Vaping Use: Never used   Substance and Sexual Activity   • Alcohol use: No   • Drug use: Never   • Sexual activity: Defer         Current Outpatient Medications:   •  Accu-Chek Softclix Lancets lancets, TEST TWO TIMES A DAY FOR   DIABETES MELLITUS, Disp: 200 each, Rfl: 3  •  Alcohol Swabs (B-D SINGLE USE SWABS REGULAR) pads, USE 3 TIMES A DAY, Disp: 300 each, Rfl: 3  •  apixaban (Eliquis) 5 MG tablet tablet, Take 1 tablet by mouth Every 12 (Twelve) Hours. Indications: Atrial Fibrillation, Disp: 60 tablet, Rfl: 3  •  aspirin 81 MG tablet, Take  by mouth Daily., Disp: , Rfl:   •  atorvastatin (LIPITOR) 20 MG tablet, Take 1 tablet by mouth Every Night. To lower  cholesterol and risk of stroke., Disp: 90 tablet, Rfl: 3  •  Blood Glucose Calibration (Accu-Chek Darcy) solution, Use as directed. E11.9, Disp: 1 each, Rfl: 3  •  cetirizine (zyrTEC) 10 MG tablet, Daily., Disp: , Rfl:   •  Cyanocobalamin (Vitamin B-12) 1000 MCG sublingual tablet, Place  under the tongue., Disp: , Rfl:   •  gabapentin (NEURONTIN) 300 MG capsule, Take 1 capsule by mouth 4 (Four) Times a Day As Needed (nerve pain)., Disp: 360 capsule, Rfl: 1  •  glimepiride (AMARYL) 1 MG tablet, Take 3 tablets by mouth Every Morning Before Breakfast., Disp: 270 tablet, Rfl: 3  •  glucose blood (Accu-Chek Darcy Plus) test strip, CHECK GLUCOSE TWO TIMES A  DAY FOR DIABETES MELLITUS, Disp: 200 each, Rfl: 3  •  glucose monitor monitoring kit, ACCU-CHECK DARCY PLUS METER as requested., Disp: 1 each, Rfl: 1  •  Lancets Misc. misc, TEST TWICE A DAY FOR DM. E11.9. patient requests acc-check softclick, Disp: 200 each, Rfl: 3  •  lidocaine (LIDODERM) 5 %, Place 1 patch on the skin as directed by provider Daily. Remove & Discard patch within 12 hours or as directed by MD, Disp: 30 patch, Rfl: 0  •  losartan-hydrochlorothiazide (HYZAAR) 100-12.5 MG per tablet, Take 1 tablet by mouth Daily. Indications: High Blood Pressure Disorder, Disp: 90 tablet, Rfl: 3  •  meloxicam (Mobic) 7.5 MG tablet, Take 1 tablet by mouth Daily., Disp: 14 tablet, Rfl: 0  •  metFORMIN ER (GLUCOPHAGE-XR) 500 MG 24 hr tablet, Take 2 tablets by mouth 2 (Two) Times a Day., Disp: 360 tablet, Rfl: 3  •  metoprolol tartrate (LOPRESSOR) 50 MG tablet, Take 1 tablet by mouth 2 (Two) Times a Day., Disp: 180 tablet, Rfl: 3  •  multivitamin with minerals tablet tablet, Take 1 tablet by mouth Daily., Disp: , Rfl:   •  traZODone (DESYREL) 50 MG tablet, Take 1 tablet by mouth Every Night., Disp: 90 tablet, Rfl: 3  •  triamcinolone (KENALOG) 0.1 % cream, Apply 1 application topically to the appropriate area as directed 2 (Two) Times a Day., Disp: 80 g, Rfl: 3    Allergies  "  Allergen Reactions   • Sulfa Antibiotics Unknown - Low Severity     Patient states he had allergic reaction to sulfa drugs when he was a child.       Review of Systems   Constitutional: Negative for fever.   HENT: Negative for dental problem and voice change.    Eyes: Negative for visual disturbance.   Respiratory: Negative for shortness of breath.    Cardiovascular: Negative for chest pain.   Gastrointestinal: Negative for abdominal pain.   Genitourinary: Negative for dysuria.   Musculoskeletal: Positive for arthralgias (right hip). Negative for gait problem and joint swelling.   Skin: Negative for rash.   Neurological: Negative for speech difficulty.   Hematological: Does not bruise/bleed easily.   Psychiatric/Behavioral: Negative for confusion.       I have reviewed the medical and surgical history, family history, social history, medications, and/or allergies, and the review of systems of this report.    Objective   /78   Ht 177.8 cm (70\")   Wt 98 kg (216 lb)   BMI 30.99 kg/m²    Physical Exam  Vitals and nursing note reviewed.   Constitutional:       Appearance: Normal appearance.   Pulmonary:      Effort: Pulmonary effort is normal.   Musculoskeletal:      Right hip: Tenderness and crepitus present. Decreased range of motion.   Neurological:      Mental Status: He is alert and oriented to person, place, and time.       Right Hip Exam     Tenderness   The patient is experiencing tenderness in the anterior and lateral.    Range of Motion   Abduction: 30   Flexion: 80     Tests   RAYSHAWN: positive  Fadir:  Positive FADIR test           Extremity DVT signs are negative on physical exam with negative Jenny sign, no calf pain, no palpable cords and no skin tone change   Neurologic Exam     Mental Status   Oriented to person, place, and time.              Assessment & Plan   Independent Review of Radiographic Studies:    No new imaging done today.      Procedures       Diagnoses and all orders for this " visit:    1. Primary osteoarthritis of right hip (Primary)  -     lidocaine (LIDODERM) 5 %; Place 1 patch on the skin as directed by provider Daily. Remove & Discard patch within 12 hours or as directed by MD  Dispense: 30 patch; Refill: 0  -     meloxicam (Mobic) 7.5 MG tablet; Take 1 tablet by mouth Daily.  Dispense: 14 tablet; Refill: 0    2. Arthralgia of right hip  -     lidocaine (LIDODERM) 5 %; Place 1 patch on the skin as directed by provider Daily. Remove & Discard patch within 12 hours or as directed by MD  Dispense: 30 patch; Refill: 0  -     meloxicam (Mobic) 7.5 MG tablet; Take 1 tablet by mouth Daily.  Dispense: 14 tablet; Refill: 0       Orthopedic activities reviewed and patient expressed appreciation  Discussion of orthopedic goals  Risk, benefits, and merits of treatment alternatives reviewed with the patient and questions answered  Ice, heat, and/or modalities as beneficial    Recommendations/Plan:  Exercise, medications, injections, other patient advice, and return appointment as noted.  Patient is encouraged to call or return for any issues or concerns.    He states he cannot plan a right GINNY at this time. He is working as a  and has to work next 12 weeks.  WE will tentatively place him on a surgery schedule in 12 weeks  Stop the meloxicam if he develops any abdominal pain, dark tarry or bloody stools.  Patient agreeable to call or return sooner for any concerns.        [employer] Dragon-transcription disclaimer:  This encounter note is an electronic transcription of spoken language to printed text.  Electronic transcription of spoken language may permit erroneous or at times nonsensical words or phrases to be inadvertently transcribed.  Although I have reviewed the note for such errors, some may still exist

## 2023-03-02 ENCOUNTER — PATIENT ROUNDING (BHMG ONLY) (OUTPATIENT)
Dept: ORTHOPEDIC SURGERY | Facility: CLINIC | Age: 68
End: 2023-03-02
Payer: MEDICARE

## 2023-03-02 NOTE — PROGRESS NOTES
"March 2, 2023    Hello, may I speak with Senate MARILYNN Capellan?    My name is Radha    I am  with [facility] [address] [id number] [phone].    Before we get started may I verify your date of birth? 1955    I am calling to officially welcome you to our practice and ask about your recent visit. Is this a good time to talk? yes    Tell me about your visit with us. What things went well?  \"appointment went good.\"       We're always looking for ways to make our patients' experiences even better. Do you have recommendations on ways we may improve?  no    Overall were you satisfied with your visit to our practice? yes       I appreciate you taking the time to speak with me today. Is there anything else I can do for you? no      Thank you, and have a great day.      "

## 2023-03-22 ENCOUNTER — TELEPHONE (OUTPATIENT)
Dept: INTERNAL MEDICINE | Facility: CLINIC | Age: 68
End: 2023-03-22
Payer: MEDICARE

## 2023-03-22 DIAGNOSIS — I48.20 ATRIAL FIBRILLATION, CHRONIC: ICD-10-CM

## 2023-03-22 NOTE — TELEPHONE ENCOUNTER
Caller: TimiSydnee    Relationship: Emergency Contact    Best call back number: [phone]    Requested Prescriptions:   Requested Prescriptions     Pending Prescriptions Disp Refills   • apixaban (Eliquis) 5 MG tablet tablet 60 tablet 3     Sig: Take 1 tablet by mouth Every 12 (Twelve) Hours. Indications: Atrial Fibrillation        Pharmacy where request should be sent: [facility] - [place] [address] [address] - [phone] [facility] [phone] FX     Last office visit with prescribing clinician: 2/21/2023   Last telemedicine visit with prescribing clinician: 5/30/2023   Next office visit with prescribing clinician: 5/30/2023     Additional details provided by patient: PATIENT WIFE STATES THAT INSURANCE HAS DROPPED ELIQUIS FROM THEIR APPROVED LIST, PLEASE CONTACT INSURANCE TO GET IT REAPPROVED. PATIENT ONLY HAS <WEEK OF PILLS LEFT    Does the patient have less than a 3 day supply:  [] Yes  [x] No    Would you like a call back once the refill request has been completed: [x] Yes [] No    If the office needs to give you a call back, can they leave a voicemail: [x] Yes [] No    Flaco Guillen Rep   03/22/23 09:00 EDT

## 2023-03-22 NOTE — TELEPHONE ENCOUNTER
Caller: Sydnee Capellan    Relationship: Emergency Contact    Best call back number: [phone]    What is the best time to reach you: ANYTIME    Who are you requesting to speak with (clinical staff, provider,  specific staff member): CLINICAL    Do you know the name of the person who called: PATIENT WIFE    What was the call regarding: PATIENT IS RUNNING LOW ON ELIQUIS, HAVING AN ISSUE WITH INSURANCE AUTHORIZATION. ARE THERE SAMPLES IN THE OFFICE AVAILABLE FOR PICKUP WHILE THIS IS BEING RESOLVED    Do you require a callback: PLEASE ADVISE

## 2023-05-04 ENCOUNTER — TELEPHONE (OUTPATIENT)
Dept: ORTHOPEDIC SURGERY | Facility: CLINIC | Age: 68
End: 2023-05-04
Payer: MEDICARE

## 2023-05-04 DIAGNOSIS — I10 ESSENTIAL HYPERTENSION: ICD-10-CM

## 2023-05-04 DIAGNOSIS — M16.11 PRIMARY OSTEOARTHRITIS OF RIGHT HIP: Primary | ICD-10-CM

## 2023-05-04 DIAGNOSIS — M25.551 ARTHRALGIA OF RIGHT HIP: ICD-10-CM

## 2023-05-04 DIAGNOSIS — I48.20 ATRIAL FIBRILLATION, CHRONIC: ICD-10-CM

## 2023-05-04 RX ORDER — METOPROLOL TARTRATE 50 MG/1
TABLET, FILM COATED ORAL
Qty: 90 TABLET | Refills: 3 | Status: SHIPPED | OUTPATIENT
Start: 2023-05-04

## 2023-05-04 NOTE — TELEPHONE ENCOUNTER
Caller: OZIEL ANDREWS    Relationship to patient: SELF    Best call back number: [phone]    Chief complaint: RIGHT HIP INJECTION    Type of visit: RIGHT HIP INJECTION    Requested date: MON, TUES, OR WED    If rescheduling, when is the original appointment: NA     Additional notes: NA

## 2023-05-04 NOTE — TELEPHONE ENCOUNTER
Spoke with patient, advised hip fluoro injections are done one Friday a month. He would like to go be put on the schedule and will call back if he has to cancel. I told him I would schedule him on the next injection day which is 5/26/23 @ 1:15 pm. I will send him letter and appt reminder card in the mail.

## 2023-05-24 ENCOUNTER — TELEPHONE (OUTPATIENT)
Dept: CARDIOLOGY | Facility: CLINIC | Age: 68
End: 2023-05-24
Payer: MEDICARE

## 2023-05-24 NOTE — TELEPHONE ENCOUNTER
Left message for patient to return call to reschedule 6/20/2023 appointment.  Unfortunately, Dr. Grajeda will not be in [place] this date.  Okay for hub to schedule next available [place] day or [place] if patient prefers.

## 2023-05-26 ENCOUNTER — HOSPITAL ENCOUNTER (OUTPATIENT)
Dept: GENERAL RADIOLOGY | Facility: HOSPITAL | Age: 68
Discharge: HOME | End: 2023-05-26
Payer: MEDICARE

## 2023-05-26 PROCEDURE — 25010000002 METHYLPREDNISOLONE PER 80 MG: Performed by: PHYSICIAN ASSISTANT

## 2023-05-26 PROCEDURE — 0 LIDOCAINE 1 % SOLUTION: Performed by: PHYSICIAN ASSISTANT

## 2023-05-26 PROCEDURE — 77002 NEEDLE LOCALIZATION BY XRAY: CPT

## 2023-05-26 RX ORDER — LIDOCAINE HYDROCHLORIDE 10 MG/ML
5 INJECTION, SOLUTION INFILTRATION; PERINEURAL ONCE
Status: COMPLETED | OUTPATIENT
Start: 2023-05-26 | End: 2023-05-26

## 2023-05-26 RX ORDER — METHYLPREDNISOLONE ACETATE 80 MG/ML
80 INJECTION, SUSPENSION INTRA-ARTICULAR; INTRALESIONAL; INTRAMUSCULAR; SOFT TISSUE ONCE
Status: COMPLETED | OUTPATIENT
Start: 2023-05-26 | End: 2023-05-26

## 2023-05-26 RX ADMIN — LIDOCAINE HYDROCHLORIDE 5 ML: 10 INJECTION, SOLUTION INFILTRATION; PERINEURAL at 14:25

## 2023-05-26 RX ADMIN — METHYLPREDNISOLONE ACETATE 80 MG: 80 INJECTION, SUSPENSION INTRA-ARTICULAR; INTRALESIONAL; INTRAMUSCULAR; SOFT TISSUE at 14:25

## 2023-05-26 NOTE — POST-PROCEDURE NOTE
[facility]  [address], [address]  [place] [address]  [phone]        PROCEDURE REPORT        DIAGNOSIS:  Right hip osteoarthritis, symptomatic    PROCEDURE: Right  hip injection under flouroscopy      Yehuda Capellan with date of birth 1955 presents to [facility] Radiology Department today for injection therapy.        Patient presents to [facility] Radiology Department Flouroscopy Suite on 5/26/2023 for planned elective right hip injection under flouroscopy for symptomatic osteoarthritis.    Procedure:     After consent was obtained, and using ethyl chloride topical local anesthetic, the right hip was then prepped and draped with sterile technique. With an anterior hip approach, flouroscopy guidance, and care to stay lateral of the femoral artery, the hip joint was entered via a 20 gauge spinal needle.  A mixture of 80 mg methylprednisolone in one ml plus 5 ml of 1% plain Lidocaine was injected and the needle withdrawn and a band aid applied. The procedure was well tolerated and without complication.  The patient did remain stable and with baseline ambulation. The patient is asked to avoid stressful physical activity for a day or two before resuming full regular activities.  There might be some soreness initially and patient to call for any adverse effect of the injection treatment.  Call or return to clinic if any fever, swelling, persistent pain, lack of anticipated improvement or other symptoms or concerns.    Impression: Symptomatic right hip osteoarthritis.      Recommendations/Plan:      Treatment and patient advice as noted here and in office visit report.  Orthopedic activities and goals reviewed and patient expressed appreciation.  Call or notify for any adverse effect from injection therapy.    Exercise: As tolerated.  No strenuous activity for a few days as appropriate.  Activity:  May perform usual activities as tolerated.      Patient will return  to our clinic at scheduled appointment.  Patient agreeable to call or return sooner for any concerns.

## 2023-05-30 ENCOUNTER — OFFICE VISIT (OUTPATIENT)
Dept: INTERNAL MEDICINE | Facility: CLINIC | Age: 68
End: 2023-05-30

## 2023-05-30 VITALS
SYSTOLIC BLOOD PRESSURE: 124 MMHG | HEART RATE: 79 BPM | RESPIRATION RATE: 16 BRPM | OXYGEN SATURATION: 96 % | DIASTOLIC BLOOD PRESSURE: 76 MMHG | WEIGHT: 210.8 LBS | BODY MASS INDEX: 30.18 KG/M2 | TEMPERATURE: 97.5 F | HEIGHT: 70 IN

## 2023-05-30 DIAGNOSIS — E11.65 TYPE 2 DIABETES MELLITUS WITH HYPERGLYCEMIA, WITHOUT LONG-TERM CURRENT USE OF INSULIN: Primary | ICD-10-CM

## 2023-05-30 LAB
EXPIRATION DATE: NORMAL
HBA1C MFR BLD: 7.6 %
Lab: NORMAL

## 2023-05-30 RX ORDER — METFORMIN HYDROCHLORIDE 500 MG/1
TABLET, EXTENDED RELEASE ORAL
Qty: 360 TABLET | Refills: 3
Start: 2023-05-30

## 2023-05-30 NOTE — PROGRESS NOTES
Chief Complaint  Diabetes (3 month follow up)  Answers for HPI/ROS submitted by the patient on 5/23/2023  What is the primary reason for your visit?: Diabetes      Subjective        Senate MARILYNN Capellan presents to [facility]  History of Present Illness  Two metformin bid too much diarrhea. Cut down to 1 in morning and 2 in evening and manageable.   Diabetes  He presents for his follow-up diabetic visit. He has type 2 diabetes mellitus. No MedicAlert identification noted. The initial diagnosis of diabetes was made 10 Years ago. Hypoglycemia symptoms include tremors. Pertinent negatives for hypoglycemia include no confusion, dizziness, headaches, hunger, mood changes, nervousness/anxiousness, pallor, seizures, speech difficulty or sweats. Associated symptoms include fatigue, foot paresthesias, polydipsia, polyuria and weakness. Pertinent negatives for diabetes include no blurred vision, no chest pain, no foot ulcerations, no polyphagia, no visual change and no weight loss. Pertinent negatives for hypoglycemia complications include no blackouts, no hospitalization, no nocturnal hypoglycemia, no required assistance and no required glucagon injection. Symptoms are stable. Diabetic complications include impotence. Pertinent negatives for diabetic complications include no CVA, heart disease, nephropathy, peripheral neuropathy, PVD or retinopathy. Risk factors for coronary artery disease include no known risk factors, hypertension and sedentary lifestyle. Current diabetic treatment includes oral agent (dual therapy). He is compliant with treatment some of the time. His weight is stable. He is following a generally healthy and high fiber diet. He has not had a previous visit with a dietitian. He rarely participates in exercise. He monitors blood glucose at home 1-2 x per day. He monitors urine at home <1 x per month. Blood glucose monitoring compliance is good. His breakfast blood glucose is taken  "between 8-9 am. His breakfast blood glucose range is generally 140-180 mg/dl. His dinner blood glucose range is generally 140-180 mg/dl. His highest blood glucose is 140-180 mg/dl. He does not see a podiatrist.Eye exam is not current.       Objective   Vital Signs:  /76 (BP Location: Right arm, Patient Position: Sitting, Cuff Size: Adult)   Pulse 79   Temp 97.5 °F (36.4 °C) (Temporal)   Resp 16   Ht 177.8 cm (70\")   Wt 95.6 kg (210 lb 12.8 oz)   SpO2 96%   BMI 30.25 kg/m²   Estimated body mass index is 30.25 kg/m² as calculated from the following:    Height as of this encounter: 177.8 cm (70\").    Weight as of this encounter: 95.6 kg (210 lb 12.8 oz).             Physical Exam  Vitals and nursing note reviewed.   Constitutional:       General: He is not in acute distress.     Appearance: Normal appearance. He is well-developed and well-groomed. He is obese. He is not ill-appearing, toxic-appearing or diaphoretic.   HENT:      Head: Normocephalic and atraumatic.      Right Ear: Hearing normal.      Left Ear: Hearing normal.   Eyes:      General: Lids are normal. No scleral icterus.        Right eye: No discharge.         Left eye: No discharge.      Extraocular Movements: Extraocular movements intact.   Pulmonary:      Effort: Pulmonary effort is normal.   Musculoskeletal:      Cervical back: Neck supple.   Skin:     Coloration: Skin is not jaundiced or pale.   Neurological:      General: No focal deficit present.      Mental Status: He is alert and oriented to person, place, and time.   Psychiatric:         Attention and Perception: Attention and perception normal.         Mood and Affect: Mood and affect normal.         Speech: Speech normal.         Behavior: Behavior normal. Behavior is cooperative.         Thought Content: Thought content normal.         Cognition and Memory: Cognition and memory normal.         Judgment: Judgment normal.        Result Review :  The following data was reviewed by: " Cindy Zhang MD on 05/30/2023:  Lab Results   Component Value Date    HGBA1C 7.6 05/30/2023    HGBA1C 8.0 02/21/2023    HGBA1C 7.1 11/15/2022                   Assessment and Plan   Diagnoses and all orders for this visit:    1. Type 2 diabetes mellitus with hyperglycemia, without long-term current use of insulin (Primary)  -     POC Glycosylated Hemoglobin (Hb A1C)  -     metFORMIN ER (GLUCOPHAGE-XR) 500 MG 24 hr tablet; Take 1 tablet by mouth Daily With Breakfast AND 2 tablets Daily With Dinner.  Dispense: 360 tablet; Refill: 3    goal for age <7.5  Work on diet.         Follow Up   Return in about 3 months (around 8/30/2023) for Diabetes follow up, A1C in office.  Patient was given instructions and counseling regarding his condition or for health maintenance advice. Please see specific information pulled into the AVS if appropriate.

## 2023-06-06 DIAGNOSIS — G63 NEUROPATHY DUE TO MEDICAL CONDITION: ICD-10-CM

## 2023-06-07 DIAGNOSIS — I48.20 ATRIAL FIBRILLATION, CHRONIC: ICD-10-CM

## 2023-06-07 RX ORDER — GABAPENTIN 300 MG/1
CAPSULE ORAL
Qty: 360 CAPSULE | Refills: 1 | Status: SHIPPED | OUTPATIENT
Start: 2023-06-07

## 2023-06-07 RX ORDER — APIXABAN 5 MG/1
TABLET, FILM COATED ORAL
Qty: 180 TABLET | Refills: 0 | OUTPATIENT
Start: 2023-06-07

## 2023-06-07 NOTE — TELEPHONE ENCOUNTER
Rx Refill Note  Requested Prescriptions     Pending Prescriptions Disp Refills    gabapentin (NEURONTIN) 300 MG capsule [Pharmacy Med Name: GABAPENTIN CAP 300MG] 360 capsule 1     Sig: TAKE 1 CAPSULE 4 TIMES     DAILY AS NEEDED FOR NERVE  PAIN      Last office visit with prescribing clinician: 5/30/2023   Last telemedicine visit with prescribing clinician: Visit date not found   Next office visit with prescribing clinician: 8/30/2023

## 2023-06-08 ENCOUNTER — OFFICE VISIT (OUTPATIENT)
Dept: CARDIOLOGY | Facility: CLINIC | Age: 68
End: 2023-06-08
Payer: MEDICARE

## 2023-06-08 VITALS
SYSTOLIC BLOOD PRESSURE: 122 MMHG | OXYGEN SATURATION: 97 % | BODY MASS INDEX: 30.49 KG/M2 | TEMPERATURE: 96.6 F | HEIGHT: 70 IN | HEART RATE: 61 BPM | WEIGHT: 213 LBS | DIASTOLIC BLOOD PRESSURE: 84 MMHG

## 2023-06-08 DIAGNOSIS — E78.5 DYSLIPIDEMIA: ICD-10-CM

## 2023-06-08 DIAGNOSIS — E11.65 TYPE 2 DIABETES MELLITUS WITH HYPERGLYCEMIA, WITHOUT LONG-TERM CURRENT USE OF INSULIN: ICD-10-CM

## 2023-06-08 DIAGNOSIS — I48.21 ATRIAL FIBRILLATION, PERMANENT: Primary | ICD-10-CM

## 2023-06-08 DIAGNOSIS — I48.20 ATRIAL FIBRILLATION, CHRONIC: ICD-10-CM

## 2023-06-08 DIAGNOSIS — I10 ESSENTIAL HYPERTENSION: ICD-10-CM

## 2023-06-08 PROCEDURE — 99213 OFFICE O/P EST LOW 20 MIN: CPT | Performed by: INTERNAL MEDICINE

## 2023-06-08 PROCEDURE — 3074F SYST BP LT 130 MM HG: CPT | Performed by: INTERNAL MEDICINE

## 2023-06-08 PROCEDURE — 3079F DIAST BP 80-89 MM HG: CPT | Performed by: INTERNAL MEDICINE

## 2023-06-08 PROCEDURE — 93000 ELECTROCARDIOGRAM COMPLETE: CPT | Performed by: INTERNAL MEDICINE

## 2023-06-08 NOTE — PROGRESS NOTES
[facility] Cardiology Office Follow Up Note    Yehuda Capellan  [id number]  2023    Primary Care Provider: Cindy Zhang MD    Chief Complaint: Routine follow-up    History of Present Illness:   Mr. Yehuda Capellan is a 68y.o. male who presents to the Cardiology Clinic for routine follow-up.  The patient has a past medical history significant for type 2 diabetes mellitus, hypertension, and hyperlipidemia.  He has a past cardiac history significant for permanent atrial fibrillation.  He is chronically anticoagulated with Eliquis for CVA prophylaxis.  He also has a history of moderate mitral regurgitation based on echocardiogram in .  He returns today for routine follow-up.  Since his last appointment, the patient reports he has been doing well with no significant changes in his health.  He remains unaware of his atrial fibrillation, with no episodes of palpitations.  He continues to tolerate Eliquis without significant bleeding or bruising.  No recent history of orthopnea, PND, or significant lower extremity swelling.  He remains active without exertional anginal symptoms.  No specific complaints today.     Past Cardiac Testin. Last Coronary Angio: None  2. Prior Stress Testin, no evidence of inducible ischemia  3. Last Echo:   1.  Normal left ventricular size and systolic function, LVEF 55-60%.  2.  Mild concentric LVH.  3.  Grade 1 diastolic dysfunction.  4.  Severely increased left atrial volume index.  5.  Normal right ventricular size and systolic function.  6.  Mild calcification aortic valve without significant stenosis.  7.  Trace AI.  8.  Mild MR.  4. Prior Holter Monitor: None    Review of Systems:   Review of Systems   Constitutional:  Negative for activity change, appetite change, chills, diaphoresis, fatigue, fever, unexpected weight gain and unexpected weight loss.   Eyes:  Negative for blurred vision and double vision.   Respiratory:   Negative for cough, chest tightness, shortness of breath and wheezing.    Cardiovascular:  Negative for chest pain, palpitations and leg swelling.   Gastrointestinal:  Negative for abdominal pain, anal bleeding, blood in stool and GERD.   Endocrine: Negative for cold intolerance and heat intolerance.   Genitourinary:  Negative for hematuria.   Neurological:  Negative for dizziness, syncope, weakness and light-headedness.   Hematological:  Does not bruise/bleed easily.   Psychiatric/Behavioral:  Negative for depressed mood and stress. The patient is not nervous/anxious.      I have reviewed and/or updated the patient's past medical, past surgical, family, social history, problem list and allergies as appropriate.     Medications:     Current Outpatient Medications:     Accu-Chek Softclix Lancets lancets, TEST TWO TIMES A DAY FOR   DIABETES MELLITUS, Disp: 200 each, Rfl: 3    Alcohol Swabs (B-D SINGLE USE SWABS REGULAR) pads, USE 3 TIMES A DAY, Disp: 300 each, Rfl: 3    apixaban (Eliquis) 5 MG tablet tablet, Take 1 tablet by mouth Every 12 (Twelve) Hours. Indications: Atrial Fibrillation, Disp: 180 tablet, Rfl: 0    aspirin 81 MG tablet, Take  by mouth Daily., Disp: , Rfl:     atorvastatin (LIPITOR) 20 MG tablet, Take 1 tablet by mouth Every Night. To lower cholesterol and risk of stroke., Disp: 90 tablet, Rfl: 3    Blood Glucose Calibration (Accu-Chek Darcy) solution, Use as directed. E11.9, Disp: 1 each, Rfl: 3    cetirizine (zyrTEC) 10 MG tablet, Daily., Disp: , Rfl:     Cyanocobalamin (Vitamin B-12) 1000 MCG sublingual tablet, Place  under the tongue., Disp: , Rfl:     gabapentin (NEURONTIN) 300 MG capsule, TAKE 1 CAPSULE 4 TIMES     DAILY AS NEEDED FOR NERVE  PAIN, Disp: 360 capsule, Rfl: 1    glimepiride (AMARYL) 1 MG tablet, Take 3 tablets by mouth Every Morning Before Breakfast., Disp: 270 tablet, Rfl: 3    glucose blood (Accu-Chek Darcy Plus) test strip, CHECK GLUCOSE TWO TIMES A  DAY FOR DIABETES MELLITUS,  "Disp: 200 each, Rfl: 3    glucose monitor monitoring kit, ACCU-CHECK ABISAI PLUS METER as requested., Disp: 1 each, Rfl: 1    Lancets Misc. misc, TEST TWICE A DAY FOR DM. E11.9. patient requests acc-check softclick, Disp: 200 each, Rfl: 3    lidocaine (LIDODERM) 5 %, Place 1 patch on the skin as directed by provider Daily. Remove & Discard patch within 12 hours or as directed by MD, Disp: 30 patch, Rfl: 0    losartan-hydrochlorothiazide (HYZAAR) 100-12.5 MG per tablet, Take 1 tablet by mouth Daily. Indications: High Blood Pressure Disorder, Disp: 90 tablet, Rfl: 3    meloxicam (Mobic) 7.5 MG tablet, Take 1 tablet by mouth Daily., Disp: 14 tablet, Rfl: 0    metFORMIN ER (GLUCOPHAGE-XR) 500 MG 24 hr tablet, Take 1 tablet by mouth Daily With Breakfast AND 2 tablets Daily With Dinner., Disp: 360 tablet, Rfl: 3    metoprolol tartrate (LOPRESSOR) 50 MG tablet, TAKE 1 TABLET TWICE A DAY, Disp: 90 tablet, Rfl: 3    multivitamin with minerals tablet tablet, Take 1 tablet by mouth Daily., Disp: , Rfl:     traZODone (DESYREL) 50 MG tablet, Take 1 tablet by mouth Every Night., Disp: 90 tablet, Rfl: 3    triamcinolone (KENALOG) 0.1 % cream, Apply 1 application topically to the appropriate area as directed 2 (Two) Times a Day., Disp: 80 g, Rfl: 3    Physical Exam:  Vital Signs:   Vitals:    06/08/23 0912   BP: 122/84   BP Location: Right arm   Patient Position: Sitting   Cuff Size: Adult   Pulse: 61   Temp: 96.6 °F (35.9 °C)   TempSrc: Temporal   SpO2: 97%   Weight: 96.6 kg (213 lb)   Height: 177.8 cm (70\")       Physical Exam  Constitutional:       General: He is not in acute distress.     Appearance: Normal appearance. He is not diaphoretic.   HENT:      Head: Normocephalic and atraumatic.   Cardiovascular:      Rate and Rhythm: Normal rate and regular rhythm.      Comments: Soft systolic murmur lower sternal border  Pulmonary:      Effort: Pulmonary effort is normal. No respiratory distress.      Breath sounds: Normal breath " sounds. No stridor. No wheezing, rhonchi or rales.   Abdominal:      General: Bowel sounds are normal. There is no distension.      Palpations: Abdomen is soft.      Tenderness: There is no abdominal tenderness. There is no guarding or rebound.   Musculoskeletal:         General: No swelling. Normal range of motion.      Cervical back: Neck supple. No tenderness.   Skin:     General: Skin is warm and dry.   Neurological:      General: No focal deficit present.      Mental Status: He is alert and oriented to person, place, and time.   Psychiatric:         Mood and Affect: Mood normal.         Behavior: Behavior normal.       Results Review:   I reviewed the patient's new clinical results.  I personally viewed and interpreted the patient's EKG/Telemetry data      ECG 12 Lead    Date/Time: 6/8/2023 12:25 PM  Performed by: Cruzito Grajeda MD  Authorized by: Cruzito Grajeda MD   Comparison: compared with previous ECG   Similar to previous ECG  Rhythm: atrial fibrillation  Rate: normal  Conduction: right bundle branch block  QRS axis: normal    Clinical impression: abnormal EKG        Assessment / Plan:     1. Atrial fibrillation, permanent   -- Remains asymptomatic  --ECG today shows atrial fibrillation remains rate controlled  --Continue metoprolol for ventricular rate control  --Continue Eliquis for CVA prophylaxis  --Follow-up in 1 year, sooner if required     2.  Moderate mitral regurgitation  -- Most recent echocardiogram shows mitral regurgitation is mild  --Asymptomatic  --Routine echocardiogram for surveillance in 2-3 years     3. Primary hypertension  -- BP remains adequately controlled with current antihypertensives     4. Pure hypercholesterolemia  -- Lipid profile in 8/22 showed LDL 50, HDL 34, triglycerides 210  --Continue high intensity statin     5. Type 2 diabetes mellitus   -- Last hemoglobin A1c in 7.6% in 5/23  -- Management per PCP       Follow Up:   Return in about 1 year (around  6/8/2024).      Thank you for allowing me to participate in the care of your patient. Please to not hesitate to contact me with additional questions or concerns.     CAROLE Grajeda MD  Interventional Cardiology   06/08/2023  09:19 EDT

## 2023-08-14 ENCOUNTER — OFFICE VISIT (OUTPATIENT)
Dept: ORTHOPEDIC SURGERY | Facility: CLINIC | Age: 68
End: 2023-08-14
Payer: MEDICARE

## 2023-08-14 ENCOUNTER — PREP FOR SURGERY (OUTPATIENT)
Dept: OTHER | Facility: HOSPITAL | Age: 68
End: 2023-08-14
Payer: MEDICARE

## 2023-08-14 VITALS
HEIGHT: 70 IN | BODY MASS INDEX: 30.78 KG/M2 | WEIGHT: 215 LBS | SYSTOLIC BLOOD PRESSURE: 141 MMHG | HEART RATE: 72 BPM | DIASTOLIC BLOOD PRESSURE: 78 MMHG

## 2023-08-14 DIAGNOSIS — M25.551 ARTHRALGIA OF RIGHT HIP: Primary | ICD-10-CM

## 2023-08-14 DIAGNOSIS — M16.11 PRIMARY OSTEOARTHRITIS OF RIGHT HIP: ICD-10-CM

## 2023-08-14 PROCEDURE — 99213 OFFICE O/P EST LOW 20 MIN: CPT | Performed by: PHYSICIAN ASSISTANT

## 2023-08-14 RX ORDER — GLIMEPIRIDE 2 MG/1
TABLET ORAL
COMMUNITY
Start: 2023-06-13

## 2023-08-14 RX ORDER — FLUOROURACIL 50 MG/G
CREAM TOPICAL
COMMUNITY
Start: 2023-08-09

## 2023-08-14 RX ORDER — TRANEXAMIC ACID 10 MG/ML
1000 INJECTION, SOLUTION INTRAVENOUS
OUTPATIENT
Start: 2023-08-15 | End: 2023-08-16

## 2023-08-14 RX ORDER — CEFAZOLIN SODIUM 2 G/50ML
2 SOLUTION INTRAVENOUS
OUTPATIENT
Start: 2023-08-15 | End: 2023-08-16

## 2023-08-14 RX ORDER — FAMOTIDINE 10 MG/ML
20 INJECTION, SOLUTION INTRAVENOUS
OUTPATIENT
Start: 2023-08-15 | End: 2023-08-16

## 2023-08-14 NOTE — PROGRESS NOTES
Subjective   Patient ID: Yehuda Capellan is a 68 y.o. right hand dominant male  Follow-up of the Right Hip (Would like to discuss total hip replacement )         History of Present Illness  Patient would like to discuss right hip replacement. He has tried Fl hip injections, oral and topical analgesics with limited right hip relief. Patient has been experiencing right anterior hip pain x 20 + years. (At age 17, He had a hip fracture that was treated and healed nonsurgically   He uses a cane routinely. Denies numbness to RLE.  The pain is interfering with his ADL's                                                   Past Medical History:   Diagnosis Date    Atrial fibrillation     Diabetes mellitus     Elevated glucose 2017    A1C 6.7    Hip fracture     right, at age 17    Hyperlipidemia     Hypertension         Past Surgical History:   Procedure Laterality Date    HAND SURGERY Left     LEFT PINKY SEWN BACK ON in [place] , cut off with table saw       Family History   Problem Relation Age of Onset    Cancer Father        Social History     Socioeconomic History    Marital status:    Tobacco Use    Smoking status: Former     Packs/day: 2.00     Years: 15.00     Pack years: 30.00     Types: Cigarettes     Quit date: 1991     Years since quittin.6     Passive exposure: Past    Smokeless tobacco: Never   Vaping Use    Vaping Use: Never used   Substance and Sexual Activity    Alcohol use: Never    Drug use: Never    Sexual activity: Not Currently     Partners: Female         Current Outpatient Medications:     fluorouracil (EFUDEX) 5 % cream, , Disp: , Rfl:     glimepiride (AMARYL) 2 MG tablet, , Disp: , Rfl:     Accu-Chek Softclix Lancets lancets, TEST TWO TIMES A DAY FOR   DIABETES MELLITUS, Disp: 200 each, Rfl: 3    Alcohol Swabs (B-D SINGLE USE SWABS REGULAR) pads, USE 3 TIMES A DAY, Disp: 300 each, Rfl: 3    apixaban (Eliquis) 5 MG tablet tablet, Take 1 tablet by mouth Every 12 (Twelve)  Hours., Disp: 180 tablet, Rfl: 2    aspirin 81 MG tablet, Take  by mouth Daily., Disp: , Rfl:     atorvastatin (LIPITOR) 20 MG tablet, Take 1 tablet by mouth Every Night. To lower cholesterol and risk of stroke., Disp: 90 tablet, Rfl: 3    Blood Glucose Calibration (Accu-Chek Darcy) solution, Use as directed. E11.9, Disp: 1 each, Rfl: 3    cetirizine (zyrTEC) 10 MG tablet, Daily., Disp: , Rfl:     Cyanocobalamin (Vitamin B-12) 1000 MCG sublingual tablet, Place  under the tongue., Disp: , Rfl:     gabapentin (NEURONTIN) 300 MG capsule, TAKE 1 CAPSULE 4 TIMES     DAILY AS NEEDED FOR NERVE  PAIN, Disp: 360 capsule, Rfl: 1    glucose blood (Accu-Chek Darcy Plus) test strip, CHECK GLUCOSE TWO TIMES A  DAY FOR DIABETES MELLITUS, Disp: 200 each, Rfl: 3    glucose monitor monitoring kit, ACCU-CHECK DARCY PLUS METER as requested., Disp: 1 each, Rfl: 1    Lancets Misc. misc, TEST TWICE A DAY FOR DM. E11.9. patient requests acc-check softclick, Disp: 200 each, Rfl: 3    lidocaine (LIDODERM) 5 %, Place 1 patch on the skin as directed by provider Daily. Remove & Discard patch within 12 hours or as directed by MD, Disp: 30 patch, Rfl: 0    losartan-hydrochlorothiazide (HYZAAR) 100-12.5 MG per tablet, Take 1 tablet by mouth Daily. Indications: High Blood Pressure Disorder, Disp: 90 tablet, Rfl: 3    meloxicam (Mobic) 7.5 MG tablet, Take 1 tablet by mouth Daily., Disp: 14 tablet, Rfl: 0    metFORMIN ER (GLUCOPHAGE-XR) 500 MG 24 hr tablet, Take 1 tablet by mouth Daily With Breakfast AND 2 tablets Daily With Dinner., Disp: 360 tablet, Rfl: 3    metoprolol tartrate (LOPRESSOR) 50 MG tablet, TAKE 1 TABLET TWICE A DAY, Disp: 90 tablet, Rfl: 3    multivitamin with minerals tablet tablet, Take 1 tablet by mouth Daily., Disp: , Rfl:     traZODone (DESYREL) 50 MG tablet, Take 1 tablet by mouth Every Night., Disp: 90 tablet, Rfl: 3    triamcinolone (KENALOG) 0.1 % cream, Apply 1 application topically to the appropriate area as directed 2  "(Two) Times a Day., Disp: 80 g, Rfl: 3    Allergies   Allergen Reactions    Sulfa Antibiotics Unknown - Low Severity     Patient states he had allergic reaction to sulfa drugs when he was a child.       Review of Systems   Musculoskeletal:  Positive for arthralgias (right hip).     I have reviewed the medical and surgical history, family history, social history, medications, and/or allergies, and the review of systems of this report.    Objective   /78 (BP Location: Right arm, Patient Position: Sitting, Cuff Size: Adult)   Pulse 72   Ht 177.8 cm (70\")   Wt 97.5 kg (215 lb)   BMI 30.85 kg/mý    Physical Exam  Vitals and nursing note reviewed.   Constitutional:       Appearance: Normal appearance.   Pulmonary:      Effort: Pulmonary effort is normal.   Neurological:      Mental Status: He is alert and oriented to person, place, and time.     Right Hip Exam     Tenderness   The patient is experiencing tenderness in the anterior.    Range of Motion   Abduction:  20   Flexion:  70     Muscle Strength   The patient has normal right hip strength.    Tests   RAYSHAWN: positive  Fadir:  Positive FADIR test         Extremity DVT signs are negative by clinical screen.   Neurologic Exam     Mental Status   Oriented to person, place, and time.            Assessment & Plan   Independent Review of Radiographic Studies:    X-ray of the right hip from previous office visit revealed severe right hip femoral acetabular joint space narrowing most consistent with tonnis stage IV OA      Procedures       Diagnoses and all orders for this visit:    1. Arthralgia of right hip (Primary)    2. Primary osteoarthritis of right hip       Orthopedic activities reviewed and patient expressed appreciation  Discussion of orthopedic goals  Risk, benefits, and merits of treatment alternatives reviewed with the patient and questions answered  The nature of the proposed surgery reviewed with the patient including risks, benefits, rehabilitation, " recovery timeframe, and outcome expectations    Recommendations/Plan:  Exercise, medications, injections, other patient advice, and return appointment as noted.  Patient is encouraged to call or return for any issues or concerns.    PLAN: Right total hip arthroplasty    Patient agreeable to call or return sooner for any concerns.                 EMR Dragon-transcription disclaimer:  This encounter note is an electronic transcription of spoken language to printed text.  Electronic transcription of spoken language may permit erroneous or at times nonsensical words or phrases to be inadvertently transcribed.  Although I have reviewed the note for such errors, some may still exist

## 2023-08-14 NOTE — PROGRESS NOTES
Yehuda Capellan is a 68 y.o.    Follow-up of the Right Hip (Would like to discuss total hip replacement )          CHIEF COMPLAINT:    Chronic right hip pain    History of Present Illness    Patient presents with chronic anterior right hip pain that has been ongoing since the age of 17.  Of note he sustained a right hip fracture that was treated nonsurgically.  He has tried cortisone injection, topical and oral analgesic without relief.  Patient denies numbness or tingling to the lower extremity      PAST MEDICAL HISTORY:    Past Medical History:   Diagnosis Date    Atrial fibrillation     Diabetes mellitus     Elevated glucose 09/29/2017    A1C 6.7    Hip fracture     right, at age 17    Hyperlipidemia     Hypertension          Current Outpatient Medications:     fluorouracil (EFUDEX) 5 % cream, , Disp: , Rfl:     glimepiride (AMARYL) 2 MG tablet, , Disp: , Rfl:     Accu-Chek Softclix Lancets lancets, TEST TWO TIMES A DAY FOR   DIABETES MELLITUS, Disp: 200 each, Rfl: 3    Alcohol Swabs (B-D SINGLE USE SWABS REGULAR) pads, USE 3 TIMES A DAY, Disp: 300 each, Rfl: 3    apixaban (Eliquis) 5 MG tablet tablet, Take 1 tablet by mouth Every 12 (Twelve) Hours., Disp: 180 tablet, Rfl: 2    aspirin 81 MG tablet, Take  by mouth Daily., Disp: , Rfl:     atorvastatin (LIPITOR) 20 MG tablet, Take 1 tablet by mouth Every Night. To lower cholesterol and risk of stroke., Disp: 90 tablet, Rfl: 3    Blood Glucose Calibration (Accu-Chek Darcy) solution, Use as directed. E11.9, Disp: 1 each, Rfl: 3    cetirizine (zyrTEC) 10 MG tablet, Daily., Disp: , Rfl:     Cyanocobalamin (Vitamin B-12) 1000 MCG sublingual tablet, Place  under the tongue., Disp: , Rfl:     gabapentin (NEURONTIN) 300 MG capsule, TAKE 1 CAPSULE 4 TIMES     DAILY AS NEEDED FOR NERVE  PAIN, Disp: 360 capsule, Rfl: 1    glucose blood (Accu-Chek Darcy Plus) test strip, CHECK GLUCOSE TWO TIMES A  DAY FOR DIABETES MELLITUS, Disp: 200 each, Rfl: 3    glucose monitor  monitoring kit, ACCU-CHECK ABISAI PLUS METER as requested., Disp: 1 each, Rfl: 1    Lancets Misc. misc, TEST TWICE A DAY FOR DM. E11.9. patient requests acc-check softclick, Disp: 200 each, Rfl: 3    lidocaine (LIDODERM) 5 %, Place 1 patch on the skin as directed by provider Daily. Remove & Discard patch within 12 hours or as directed by MD, Disp: 30 patch, Rfl: 0    losartan-hydrochlorothiazide (HYZAAR) 100-12.5 MG per tablet, Take 1 tablet by mouth Daily. Indications: High Blood Pressure Disorder, Disp: 90 tablet, Rfl: 3    meloxicam (Mobic) 7.5 MG tablet, Take 1 tablet by mouth Daily., Disp: 14 tablet, Rfl: 0    metFORMIN ER (GLUCOPHAGE-XR) 500 MG 24 hr tablet, Take 1 tablet by mouth Daily With Breakfast AND 2 tablets Daily With Dinner., Disp: 360 tablet, Rfl: 3    metoprolol tartrate (LOPRESSOR) 50 MG tablet, TAKE 1 TABLET TWICE A DAY, Disp: 90 tablet, Rfl: 3    multivitamin with minerals tablet tablet, Take 1 tablet by mouth Daily., Disp: , Rfl:     traZODone (DESYREL) 50 MG tablet, Take 1 tablet by mouth Every Night., Disp: 90 tablet, Rfl: 3    triamcinolone (KENALOG) 0.1 % cream, Apply 1 application topically to the appropriate area as directed 2 (Two) Times a Day., Disp: 80 g, Rfl: 3    Past Surgical History:   Procedure Laterality Date    HAND SURGERY Left     LEFT PINKY SEWN BACK ON in [place] , cut off with table saw       Family History   Problem Relation Age of Onset    Cancer Father        Social History     Socioeconomic History    Marital status:    Tobacco Use    Smoking status: Former     Packs/day: 2.00     Years: 15.00     Pack years: 30.00     Types: Cigarettes     Quit date: 1991     Years since quittin.6     Passive exposure: Past    Smokeless tobacco: Never   Vaping Use    Vaping Use: Never used   Substance and Sexual Activity    Alcohol use: Never    Drug use: Never    Sexual activity: Not Currently     Partners: Female        Allergies   Allergen Reactions    Sulfa  "Antibiotics Unknown - Low Severity     Patient states he had allergic reaction to sulfa drugs when he was a child.       Review of Systems   Musculoskeletal:  Positive for arthralgias (right hip pain).   Neurological:  Negative for numbness.     I have reviewed the medical and surgical history, family history, social history, medications, and/or allergies, and the review of systems of this report.    PHYSICAL EXAMINATION:       /78 (BP Location: Right arm, Patient Position: Sitting, Cuff Size: Adult)   Pulse 72   Ht 177.8 cm (70\")   Wt 97.5 kg (215 lb)   BMI 30.85 kg/mý     GENERAL [x] Well developed  []Ill appearing [x] No acute distress    HEENT [x]No acute changes [x] Normocephalic, atraumatic    NECK [x]Supple  [] No midline tenderness    LUNGS [x]Clear bilaterally [x]No wheezes []Rhonchi [] Rales    HEART [x] Regular rate  [x] Regular rhythm [] Irregular    ABDOMEN [x] Soft [x] Not tender [x] Not distended [x] Normal sounds    VAS/EXT [x] Normal Pulses []Edema []Cyanosis             SKIN [x] Warm [x]Dry []Pink []Ecchymosis []Cool    NEURO [x] Sensation Intact [x] Motor Intact [x] Pulse intact               Physical Exam  Vitals and nursing note reviewed.   Constitutional:       Appearance: Normal appearance.   Musculoskeletal:      Right hip: Tenderness and crepitus present. Decreased range of motion.   Neurological:      Mental Status: He is alert and oriented to person, place, and time.     Right Hip Exam     Tenderness   The patient is experiencing tenderness in the anterior.    Range of Motion   Abduction:  20   Flexion:  60   External rotation:  20   Internal rotation:  15     Muscle Strength   The patient has normal right hip strength.    Tests   RAYSHAWN: positive  Fadir:  Positive FADIR test        Extremity DVT signs are negative on physical exam with negative Jenny sign, no calf pain, no palpable cords, and no skin tone change   Neurologic Exam     Mental Status   Oriented to person, place, and " time.         DVT Screening: No Yes   Smoker [x] []   Personal/Family History of DVT or PE [x] []   Sedentary Lifestyle [x] []   BMI >30 [] [x]      Tranexamic acid TXA criteria for usage during arthroplasty surgery.    Previous or Active DVT/PE: NO  Cardiac Stent within 1 year: NO  Embolic/Ischemic stroke within 1 year: NO  Creatinine less than 30ml/min: NO  Congenital Thrombophilia: NO  Hypersensitivity to TXA: NO  Color Blindness: NO  NOTE:  TXA  tranexemic acid summary: THIS PATIENT IS A CANDIDATE FOR IV TXA DURING SURGERY.     Independent Review of Radiographic Studies:    No new imaging done today.  Reviewed imaging with patient at a prior visit.  Reviewed relevant prior imaging with patient again today. C/w TONNIS GRADE IV          Diagnoses and all orders for this visit:    1. Arthralgia of right hip (Primary)    2. Primary osteoarthritis of right hip         *SPECIAL INSTRUCTIONS:  Multi-modal analgesia.  Tranexamic acid IV protocol (see screening parameters this report).  Multi-modal DVT prophylaxis.  Rehabilitation PT/OT protocol with same day walker ambulation with therapist.      Risks, benefits, and alternative treatments discussed with the patient: [x] Yes [] No    Risk benefits and merits of the proposed surgery were discussed and the patient's questions were answered risks discussed including and not limited to:  Anesthesia reactions  Blood loss and possible transfusion  Infection  Deep venous thrombosis and pulmonary embolus  Nerve, vascular or tendon injury  Fracture  Deformity  Stiffness  Weakness  Prosthesis and implant issues such as loosening, material wear or dislocation  Skin necrosis  Revision surgery or further treatment  Recurrence of problem and condition     Informed consent: [] Signed  [x] To be obtained at hospital  [] Both    Recommendations/Plan:  Discussion of orthopaedic goals and activities and patient expressed appreciation.  Risk, benefits, and merits of treatment alternatives  reviewed with the patient and questions answered  Regular exercise as tolerated  Guided on proper techniques for mobility, strength, agility and/or conditioning exercises  Regular exercise as tolerated  Guided on proper techniques for mobility, strength, agility and/or conditioning exercises  Weight bearing parameters reviewed  Physical therapy program ongoing  Take prescribed medications as instructed only as tolerated        PLANNED SURGICAL PROCEDURE: Elective Right GINNY      Patient is encouraged and agreeable to call or return sooner for any issues or concerns.

## 2023-08-14 NOTE — H&P (VIEW-ONLY)
Yehuda Capellan is a 68 y.o.    Follow-up of the Right Hip (Would like to discuss total hip replacement )          CHIEF COMPLAINT:    Chronic right hip pain    History of Present Illness    Patient presents with chronic anterior right hip pain that has been ongoing since the age of 17.  Of note he sustained a right hip fracture that was treated nonsurgically.  He has tried cortisone injection, topical and oral analgesic without relief.  Patient denies numbness or tingling to the lower extremity      PAST MEDICAL HISTORY:    Past Medical History:   Diagnosis Date    Atrial fibrillation     Diabetes mellitus     Elevated glucose 09/29/2017    A1C 6.7    Hip fracture     right, at age 17    Hyperlipidemia     Hypertension          Current Outpatient Medications:     fluorouracil (EFUDEX) 5 % cream, , Disp: , Rfl:     glimepiride (AMARYL) 2 MG tablet, , Disp: , Rfl:     Accu-Chek Softclix Lancets lancets, TEST TWO TIMES A DAY FOR   DIABETES MELLITUS, Disp: 200 each, Rfl: 3    Alcohol Swabs (B-D SINGLE USE SWABS REGULAR) pads, USE 3 TIMES A DAY, Disp: 300 each, Rfl: 3    apixaban (Eliquis) 5 MG tablet tablet, Take 1 tablet by mouth Every 12 (Twelve) Hours., Disp: 180 tablet, Rfl: 2    aspirin 81 MG tablet, Take  by mouth Daily., Disp: , Rfl:     atorvastatin (LIPITOR) 20 MG tablet, Take 1 tablet by mouth Every Night. To lower cholesterol and risk of stroke., Disp: 90 tablet, Rfl: 3    Blood Glucose Calibration (Accu-Chek Darcy) solution, Use as directed. E11.9, Disp: 1 each, Rfl: 3    cetirizine (zyrTEC) 10 MG tablet, Daily., Disp: , Rfl:     Cyanocobalamin (Vitamin B-12) 1000 MCG sublingual tablet, Place  under the tongue., Disp: , Rfl:     gabapentin (NEURONTIN) 300 MG capsule, TAKE 1 CAPSULE 4 TIMES     DAILY AS NEEDED FOR NERVE  PAIN, Disp: 360 capsule, Rfl: 1    glucose blood (Accu-Chek Darcy Plus) test strip, CHECK GLUCOSE TWO TIMES A  DAY FOR DIABETES MELLITUS, Disp: 200 each, Rfl: 3    glucose monitor  monitoring kit, ACCU-CHECK ABISAI PLUS METER as requested., Disp: 1 each, Rfl: 1    Lancets Misc. misc, TEST TWICE A DAY FOR DM. E11.9. patient requests acc-check softclick, Disp: 200 each, Rfl: 3    lidocaine (LIDODERM) 5 %, Place 1 patch on the skin as directed by provider Daily. Remove & Discard patch within 12 hours or as directed by MD, Disp: 30 patch, Rfl: 0    losartan-hydrochlorothiazide (HYZAAR) 100-12.5 MG per tablet, Take 1 tablet by mouth Daily. Indications: High Blood Pressure Disorder, Disp: 90 tablet, Rfl: 3    meloxicam (Mobic) 7.5 MG tablet, Take 1 tablet by mouth Daily., Disp: 14 tablet, Rfl: 0    metFORMIN ER (GLUCOPHAGE-XR) 500 MG 24 hr tablet, Take 1 tablet by mouth Daily With Breakfast AND 2 tablets Daily With Dinner., Disp: 360 tablet, Rfl: 3    metoprolol tartrate (LOPRESSOR) 50 MG tablet, TAKE 1 TABLET TWICE A DAY, Disp: 90 tablet, Rfl: 3    multivitamin with minerals tablet tablet, Take 1 tablet by mouth Daily., Disp: , Rfl:     traZODone (DESYREL) 50 MG tablet, Take 1 tablet by mouth Every Night., Disp: 90 tablet, Rfl: 3    triamcinolone (KENALOG) 0.1 % cream, Apply 1 application topically to the appropriate area as directed 2 (Two) Times a Day., Disp: 80 g, Rfl: 3    Past Surgical History:   Procedure Laterality Date    HAND SURGERY Left     LEFT PINKY SEWN BACK ON in [place] , cut off with table saw       Family History   Problem Relation Age of Onset    Cancer Father        Social History     Socioeconomic History    Marital status:    Tobacco Use    Smoking status: Former     Packs/day: 2.00     Years: 15.00     Pack years: 30.00     Types: Cigarettes     Quit date: 1991     Years since quittin.6     Passive exposure: Past    Smokeless tobacco: Never   Vaping Use    Vaping Use: Never used   Substance and Sexual Activity    Alcohol use: Never    Drug use: Never    Sexual activity: Not Currently     Partners: Female        Allergies   Allergen Reactions    Sulfa  "Antibiotics Unknown - Low Severity     Patient states he had allergic reaction to sulfa drugs when he was a child.       Review of Systems   Musculoskeletal:  Positive for arthralgias (right hip pain).   Neurological:  Negative for numbness.     I have reviewed the medical and surgical history, family history, social history, medications, and/or allergies, and the review of systems of this report.    PHYSICAL EXAMINATION:       /78 (BP Location: Right arm, Patient Position: Sitting, Cuff Size: Adult)   Pulse 72   Ht 177.8 cm (70\")   Wt 97.5 kg (215 lb)   BMI 30.85 kg/m²     GENERAL [x] Well developed  []Ill appearing [x] No acute distress    HEENT [x]No acute changes [x] Normocephalic, atraumatic    NECK [x]Supple  [] No midline tenderness    LUNGS [x]Clear bilaterally [x]No wheezes []Rhonchi [] Rales    HEART [x] Regular rate  [x] Regular rhythm [] Irregular    ABDOMEN [x] Soft [x] Not tender [x] Not distended [x] Normal sounds    VAS/EXT [x] Normal Pulses []Edema []Cyanosis             SKIN [x] Warm [x]Dry []Pink []Ecchymosis []Cool    NEURO [x] Sensation Intact [x] Motor Intact [x] Pulse intact               Physical Exam  Vitals and nursing note reviewed.   Constitutional:       Appearance: Normal appearance.   Musculoskeletal:      Right hip: Tenderness and crepitus present. Decreased range of motion.   Neurological:      Mental Status: He is alert and oriented to person, place, and time.     Right Hip Exam     Tenderness   The patient is experiencing tenderness in the anterior.    Range of Motion   Abduction:  20   Flexion:  60   External rotation:  20   Internal rotation:  15     Muscle Strength   The patient has normal right hip strength.    Tests   RAYSHAWN: positive  Fadir:  Positive FADIR test        Extremity DVT signs are negative on physical exam with negative Jenny sign, no calf pain, no palpable cords, and no skin tone change   Neurologic Exam     Mental Status   Oriented to person, place, and " time.         DVT Screening: No Yes   Smoker [x] []   Personal/Family History of DVT or PE [x] []   Sedentary Lifestyle [x] []   BMI >30 [] [x]      Tranexamic acid TXA criteria for usage during arthroplasty surgery.    Previous or Active DVT/PE: NO  Cardiac Stent within 1 year: NO  Embolic/Ischemic stroke within 1 year: NO  Creatinine less than 30ml/min: NO  Congenital Thrombophilia: NO  Hypersensitivity to TXA: NO  Color Blindness: NO  NOTE:  TXA  tranexemic acid summary: THIS PATIENT IS A CANDIDATE FOR IV TXA DURING SURGERY.     Independent Review of Radiographic Studies:    No new imaging done today.  Reviewed imaging with patient at a prior visit.  Reviewed relevant prior imaging with patient again today. C/w TONNIS GRADE IV          Diagnoses and all orders for this visit:    1. Arthralgia of right hip (Primary)    2. Primary osteoarthritis of right hip         *SPECIAL INSTRUCTIONS:  Multi-modal analgesia.  Tranexamic acid IV protocol (see screening parameters this report).  Multi-modal DVT prophylaxis.  Rehabilitation PT/OT protocol with same day walker ambulation with therapist.      Risks, benefits, and alternative treatments discussed with the patient: [x] Yes [] No    Risk benefits and merits of the proposed surgery were discussed and the patient's questions were answered risks discussed including and not limited to:  Anesthesia reactions  Blood loss and possible transfusion  Infection  Deep venous thrombosis and pulmonary embolus  Nerve, vascular or tendon injury  Fracture  Deformity  Stiffness  Weakness  Prosthesis and implant issues such as loosening, material wear or dislocation  Skin necrosis  Revision surgery or further treatment  Recurrence of problem and condition     Informed consent: [] Signed  [x] To be obtained at hospital  [] Both    Recommendations/Plan:  Discussion of orthopaedic goals and activities and patient expressed appreciation.  Risk, benefits, and merits of treatment alternatives  reviewed with the patient and questions answered  Regular exercise as tolerated  Guided on proper techniques for mobility, strength, agility and/or conditioning exercises  Regular exercise as tolerated  Guided on proper techniques for mobility, strength, agility and/or conditioning exercises  Weight bearing parameters reviewed  Physical therapy program ongoing  Take prescribed medications as instructed only as tolerated        PLANNED SURGICAL PROCEDURE: Elective Right GINNY      Patient is encouraged and agreeable to call or return sooner for any issues or concerns.

## 2023-08-15 ENCOUNTER — TELEPHONE (OUTPATIENT)
Dept: ORTHOPEDIC SURGERY | Facility: CLINIC | Age: 68
End: 2023-08-15
Payer: MEDICARE

## 2023-08-15 NOTE — TELEPHONE ENCOUNTER
Provider: DR GAMINO   Caller: XIAO ANDREWS  Relationship to Patient: SELF    Phone Number: [phone] (home)     Reason for Call: PATIENT IS HAVING HIP SURGERY IN SEPTEMBER  PATIENT WANTS TO KNOW WHEN HE SHOULD QUIT TAKING HIS BLOOD THINNER apixaban (Eliquis) 5 MG tablet tablet [085303]  TO HAVE THIS SX DONE    PATIENT IS REQUESTING A CALL BACK  PLEASE ADVISE

## 2023-08-15 NOTE — TELEPHONE ENCOUNTER
LVM for patient stating that I would contact Dr. Cooks office to see when he could stop blood thinner. Will contact patient once he responds.

## 2023-08-21 ENCOUNTER — TELEPHONE (OUTPATIENT)
Dept: PREADMISSION TESTING | Facility: HOSPITAL | Age: 68
End: 2023-08-21

## 2023-08-21 ENCOUNTER — TELEPHONE (OUTPATIENT)
Dept: CARDIOLOGY | Facility: CLINIC | Age: 68
End: 2023-08-21
Payer: MEDICARE

## 2023-08-21 NOTE — TELEPHONE ENCOUNTER
Patient is having right total hip replacement on 9/5 and needs instructions for holding his Eliquis. Please advise

## 2023-08-21 NOTE — TELEPHONE ENCOUNTER
"  Caller: Yehuda Capellan \"Ray\"    Relationship: Self    Best call back number: [phone] THIS IS WIFE NUMBER     What is the best time to reach you: ANY    Who are you requesting to speak with (clinical staff, provider,  specific staff member): CLINICAL        What was the call regarding: HIP REPLACEMENT 09.05.23   PATIENT NEEDS TO KNOW WHEN HE SHOULD STOP TAKING HIS ELIQUIS FOR THE PROCEDURE .       Is it okay if the provider responds through MyChart: CALL WIFE         "

## 2023-08-21 NOTE — TELEPHONE ENCOUNTER
Patient can HOLD eliquis 3-5 days pre-procedure or per surgeon's preference.  Eliquis can be resumed post-op day 1 or when surgeon deems reasonable.

## 2023-08-23 ENCOUNTER — TELEPHONE (OUTPATIENT)
Dept: PREADMISSION TESTING | Facility: HOSPITAL | Age: 68
End: 2023-08-23

## 2023-08-24 ENCOUNTER — PRE-ADMISSION TESTING (OUTPATIENT)
Dept: PREADMISSION TESTING | Facility: HOSPITAL | Age: 68
End: 2023-08-24
Payer: MEDICARE

## 2023-08-24 ENCOUNTER — HOSPITAL ENCOUNTER (OUTPATIENT)
Dept: GENERAL RADIOLOGY | Facility: HOSPITAL | Age: 68
Discharge: HOME | End: 2023-08-24
Payer: MEDICARE

## 2023-08-24 VITALS — BODY MASS INDEX: 30.18 KG/M2 | HEIGHT: 70 IN | WEIGHT: 210.8 LBS

## 2023-08-24 DIAGNOSIS — M25.551 ARTHRALGIA OF RIGHT HIP: ICD-10-CM

## 2023-08-24 DIAGNOSIS — M16.11 PRIMARY OSTEOARTHRITIS OF RIGHT HIP: ICD-10-CM

## 2023-08-24 LAB
ABO GROUP BLD: NORMAL
ALBUMIN SERPL-MCNC: 4.6 G/DL (ref 3.5–5.2)
ALBUMIN/GLOB SERPL: 1.4 G/DL
ALP SERPL-CCNC: 66 U/L (ref 39–117)
ALT SERPL W P-5'-P-CCNC: 29 U/L (ref 1–41)
ANION GAP SERPL CALCULATED.3IONS-SCNC: 12.2 MMOL/L (ref 5–15)
APTT PPP: 33.4 SECONDS (ref 23.5–35.5)
AST SERPL-CCNC: 26 U/L (ref 1–40)
BACTERIA UR QL AUTO: ABNORMAL /HPF
BASOPHILS # BLD AUTO: 0.08 10*3/MM3 (ref 0–0.2)
BASOPHILS NFR BLD AUTO: 0.9 % (ref 0–1.5)
BILIRUB SERPL-MCNC: 1 MG/DL (ref 0–1.2)
BILIRUB UR QL STRIP: NEGATIVE
BUN SERPL-MCNC: 21 MG/DL (ref 8–23)
BUN/CREAT SERPL: 21.9 (ref 7–25)
CALCIUM SPEC-SCNC: 10.5 MG/DL (ref 8.6–10.5)
CHLORIDE SERPL-SCNC: 100 MMOL/L (ref 98–107)
CLARITY UR: ABNORMAL
CO2 SERPL-SCNC: 25.8 MMOL/L (ref 22–29)
COLOR UR: YELLOW
CREAT SERPL-MCNC: 0.96 MG/DL (ref 0.76–1.27)
DEPRECATED RDW RBC AUTO: 39.1 FL (ref 37–54)
EGFRCR SERPLBLD CKD-EPI 2021: 85 ML/MIN/1.73
EOSINOPHIL # BLD AUTO: 0.21 10*3/MM3 (ref 0–0.4)
EOSINOPHIL NFR BLD AUTO: 2.3 % (ref 0.3–6.2)
ERYTHROCYTE [DISTWIDTH] IN BLOOD BY AUTOMATED COUNT: 12.9 % (ref 12.3–15.4)
GLOBULIN UR ELPH-MCNC: 3.2 GM/DL
GLUCOSE SERPL-MCNC: 180 MG/DL (ref 65–99)
GLUCOSE UR STRIP-MCNC: ABNORMAL MG/DL
HBA1C MFR BLD: 7.3 % (ref 4.8–5.6)
HCT VFR BLD AUTO: 45.5 % (ref 37.5–51)
HGB BLD-MCNC: 16.1 G/DL (ref 13–17.7)
HGB UR QL STRIP.AUTO: NEGATIVE
HYALINE CASTS UR QL AUTO: ABNORMAL /LPF
IMM GRANULOCYTES # BLD AUTO: 0.02 10*3/MM3 (ref 0–0.05)
IMM GRANULOCYTES NFR BLD AUTO: 0.2 % (ref 0–0.5)
INR PPP: 1.2 (ref 0.9–1.1)
KETONES UR QL STRIP: NEGATIVE
LEUKOCYTE ESTERASE UR QL STRIP.AUTO: ABNORMAL
LYMPHOCYTES # BLD AUTO: 2.41 10*3/MM3 (ref 0.7–3.1)
LYMPHOCYTES NFR BLD AUTO: 26.6 % (ref 19.6–45.3)
MCH RBC QN AUTO: 30 PG (ref 26.6–33)
MCHC RBC AUTO-ENTMCNC: 35.4 G/DL (ref 31.5–35.7)
MCV RBC AUTO: 84.9 FL (ref 79–97)
MONOCYTES # BLD AUTO: 0.65 10*3/MM3 (ref 0.1–0.9)
MONOCYTES NFR BLD AUTO: 7.2 % (ref 5–12)
NEUTROPHILS NFR BLD AUTO: 5.68 10*3/MM3 (ref 1.7–7)
NEUTROPHILS NFR BLD AUTO: 62.8 % (ref 42.7–76)
NITRITE UR QL STRIP: NEGATIVE
NRBC BLD AUTO-RTO: 0 /100 WBC (ref 0–0.2)
PH UR STRIP.AUTO: 6 [PH] (ref 5–8)
PLATELET # BLD AUTO: 279 10*3/MM3 (ref 140–450)
PMV BLD AUTO: 10.3 FL (ref 6–12)
POTASSIUM SERPL-SCNC: 4.2 MMOL/L (ref 3.5–5.2)
PROT SERPL-MCNC: 7.8 G/DL (ref 6–8.5)
PROT UR QL STRIP: ABNORMAL
PROTHROMBIN TIME: 15.7 SECONDS (ref 12.3–15.1)
RBC # BLD AUTO: 5.36 10*6/MM3 (ref 4.14–5.8)
RBC # UR STRIP: ABNORMAL /HPF
REF LAB TEST METHOD: ABNORMAL
RH BLD: NEGATIVE
SODIUM SERPL-SCNC: 138 MMOL/L (ref 136–145)
SP GR UR STRIP: 1.02 (ref 1–1.03)
SQUAMOUS #/AREA URNS HPF: ABNORMAL /HPF
UROBILINOGEN UR QL STRIP: ABNORMAL
WBC # UR STRIP: ABNORMAL /HPF
WBC NRBC COR # BLD: 9.05 10*3/MM3 (ref 3.4–10.8)

## 2023-08-24 PROCEDURE — 71046 X-RAY EXAM CHEST 2 VIEWS: CPT

## 2023-08-24 PROCEDURE — 87086 URINE CULTURE/COLONY COUNT: CPT

## 2023-08-24 PROCEDURE — 85025 COMPLETE CBC W/AUTO DIFF WBC: CPT

## 2023-08-24 PROCEDURE — 85610 PROTHROMBIN TIME: CPT

## 2023-08-24 PROCEDURE — 86901 BLOOD TYPING SEROLOGIC RH(D): CPT

## 2023-08-24 PROCEDURE — 36415 COLL VENOUS BLD VENIPUNCTURE: CPT

## 2023-08-24 PROCEDURE — 87186 SC STD MICRODIL/AGAR DIL: CPT

## 2023-08-24 PROCEDURE — 86900 BLOOD TYPING SEROLOGIC ABO: CPT

## 2023-08-24 PROCEDURE — 87081 CULTURE SCREEN ONLY: CPT

## 2023-08-24 PROCEDURE — 80053 COMPREHEN METABOLIC PANEL: CPT

## 2023-08-24 PROCEDURE — 85730 THROMBOPLASTIN TIME PARTIAL: CPT

## 2023-08-24 PROCEDURE — 83036 HEMOGLOBIN GLYCOSYLATED A1C: CPT

## 2023-08-24 PROCEDURE — 81001 URINALYSIS AUTO W/SCOPE: CPT

## 2023-08-25 LAB — MRSA SPEC QL CULT: NORMAL

## 2023-08-26 LAB — BACTERIA SPEC AEROBE CULT: ABNORMAL

## 2023-08-28 ENCOUNTER — TELEPHONE (OUTPATIENT)
Dept: ORTHOPEDIC SURGERY | Facility: CLINIC | Age: 68
End: 2023-08-28
Payer: MEDICARE

## 2023-08-28 DIAGNOSIS — R82.81 PYURIA: Primary | ICD-10-CM

## 2023-08-28 DIAGNOSIS — G47.9 TROUBLE IN SLEEPING: ICD-10-CM

## 2023-08-28 RX ORDER — TRAZODONE HYDROCHLORIDE 50 MG/1
TABLET ORAL
Qty: 90 TABLET | Refills: 3 | Status: SHIPPED | OUTPATIENT
Start: 2023-08-28

## 2023-08-28 RX ORDER — NITROFURANTOIN 25; 75 MG/1; MG/1
100 CAPSULE ORAL 2 TIMES DAILY
Qty: 14 CAPSULE | Refills: 0 | Status: SHIPPED | OUTPATIENT
Start: 2023-08-28

## 2023-08-28 NOTE — PROGRESS NOTES
He needs to start an abx for his UTI. Do you care to inform patient of the UTI, and I will order the abx?

## 2023-08-30 ENCOUNTER — OFFICE VISIT (OUTPATIENT)
Dept: INTERNAL MEDICINE | Facility: CLINIC | Age: 68
End: 2023-08-30
Payer: MEDICARE

## 2023-08-30 VITALS
DIASTOLIC BLOOD PRESSURE: 70 MMHG | TEMPERATURE: 97.6 F | OXYGEN SATURATION: 98 % | RESPIRATION RATE: 16 BRPM | SYSTOLIC BLOOD PRESSURE: 128 MMHG | HEART RATE: 53 BPM | HEIGHT: 70 IN | BODY MASS INDEX: 30.18 KG/M2 | WEIGHT: 210.8 LBS

## 2023-08-30 DIAGNOSIS — R79.9 ABNORMAL BLOOD CHEMISTRY: ICD-10-CM

## 2023-08-30 DIAGNOSIS — E66.09 CLASS 1 OBESITY DUE TO EXCESS CALORIES WITH SERIOUS COMORBIDITY AND BODY MASS INDEX (BMI) OF 30.0 TO 30.9 IN ADULT: ICD-10-CM

## 2023-08-30 DIAGNOSIS — G63 NEUROPATHY DUE TO MEDICAL CONDITION: ICD-10-CM

## 2023-08-30 DIAGNOSIS — E11.65 TYPE 2 DIABETES MELLITUS WITH HYPERGLYCEMIA, WITHOUT LONG-TERM CURRENT USE OF INSULIN: ICD-10-CM

## 2023-08-30 DIAGNOSIS — Z91.81 AT MODERATE RISK FOR FALL: ICD-10-CM

## 2023-08-30 DIAGNOSIS — Z00.00 MEDICARE ANNUAL WELLNESS VISIT, SUBSEQUENT: Primary | ICD-10-CM

## 2023-08-30 DIAGNOSIS — E55.9 VITAMIN D DEFICIENCY: ICD-10-CM

## 2023-08-30 DIAGNOSIS — I48.20 ATRIAL FIBRILLATION, CHRONIC: ICD-10-CM

## 2023-08-30 DIAGNOSIS — I10 ESSENTIAL HYPERTENSION: ICD-10-CM

## 2023-08-30 DIAGNOSIS — E53.8 B12 DEFICIENCY: ICD-10-CM

## 2023-08-30 DIAGNOSIS — Z51.81 MEDICATION MONITORING ENCOUNTER: ICD-10-CM

## 2023-08-30 RX ORDER — ATORVASTATIN CALCIUM 20 MG/1
20 TABLET, FILM COATED ORAL NIGHTLY
Qty: 90 TABLET | Refills: 3 | Status: SHIPPED | OUTPATIENT
Start: 2023-08-30

## 2023-08-30 NOTE — PROGRESS NOTES
The ABCs of the Annual Wellness Visit  Subsequent Medicare Wellness Visit    Nicholas Capellan is a 68 y.o. male who presents for a Subsequent Medicare Wellness Visit.    The following portions of the patient's history were reviewed and   updated as appropriate: allergies, current medications, past family history, past medical history, past social history, past surgical history, and problem list.    Compared to one year ago, the patient feels his physical   health is worse.    Compared to one year ago, the patient feels his mental   health is the same.    Recent Hospitalizations:  He was not admitted to the hospital during the last year.       Current Medical Providers:  Patient Care Team:  Cindy Zhang MD as PCP - General (Family Medicine)  Jennifer Lawler MD as Consulting Physician (Endocrinology)    Outpatient Medications Prior to Visit   Medication Sig Dispense Refill    Accu-Chek Softclix Lancets lancets TEST TWO TIMES A DAY FOR   DIABETES MELLITUS 200 each 3    Alcohol Swabs (B-D SINGLE USE SWABS REGULAR) pads USE 3 TIMES A  each 3    apixaban (Eliquis) 5 MG tablet tablet Take 1 tablet by mouth Every 12 (Twelve) Hours. 180 tablet 2    Blood Glucose Calibration (Accu-Chek Darcy) solution Use as directed. E11.9 1 each 3    cetirizine (zyrTEC) 10 MG tablet Take 1 tablet by mouth Daily.      Cyanocobalamin (Vitamin B-12) 1000 MCG sublingual tablet Place  under the tongue Daily.      fluorouracil (EFUDEX) 5 % cream       gabapentin (NEURONTIN) 300 MG capsule TAKE 1 CAPSULE 4 TIMES     DAILY AS NEEDED FOR NERVE  PAIN (Patient taking differently: Take 1 capsule by mouth 4 (Four) Times a Day As Needed.) 360 capsule 1    glimepiride (AMARYL) 2 MG tablet Take 1.5 tablets by mouth Daily.      glucose blood (Accu-Chek Darcy Plus) test strip CHECK GLUCOSE TWO TIMES A  DAY FOR DIABETES MELLITUS 200 each 3    glucose monitor monitoring kit ACCU-CHECK DARCY PLUS METER as requested. 1 each 1     Lancets Misc. misc TEST TWICE A DAY FOR DM. E11.9. patient requests acc-check softclick 200 each 3    lidocaine (LIDODERM) 5 % Place 1 patch on the skin as directed by provider Daily. Remove & Discard patch within 12 hours or as directed by MD 30 patch 0    losartan-hydrochlorothiazide (HYZAAR) 100-12.5 MG per tablet Take 1 tablet by mouth Daily. Indications: High Blood Pressure Disorder 90 tablet 3    meloxicam (Mobic) 7.5 MG tablet Take 1 tablet by mouth Daily. 14 tablet 0    metoprolol tartrate (LOPRESSOR) 50 MG tablet TAKE 1 TABLET TWICE A DAY (Patient taking differently: Take 1 tablet by mouth 2 (Two) Times a Day.) 90 tablet 3    multivitamin with minerals tablet tablet Take 1 tablet by mouth Daily.      nitrofurantoin, macrocrystal-monohydrate, (Macrobid) 100 MG capsule Take 1 capsule by mouth 2 (Two) Times a Day. 14 capsule 0    traZODone (DESYREL) 50 MG tablet TAKE 1 TABLET EVERY NIGHT 90 tablet 3    triamcinolone (KENALOG) 0.1 % cream Apply 1 application topically to the appropriate area as directed 2 (Two) Times a Day. 80 g 3    atorvastatin (LIPITOR) 20 MG tablet Take 1 tablet by mouth Every Night. To lower cholesterol and risk of stroke. 90 tablet 3    metFORMIN ER (GLUCOPHAGE-XR) 500 MG 24 hr tablet Take 1 tablet by mouth Daily With Breakfast AND 2 tablets Daily With Dinner. 360 tablet 3    aspirin 81 MG tablet Take  by mouth Daily.       No facility-administered medications prior to visit.       No opioid medication identified on active medication list. I have reviewed chart for other potential  high risk medication/s and harmful drug interactions in the elderly.        Aspirin is on active medication list. Aspirin use is indicated based on review of current medical condition/s. Pros and cons of this therapy have been discussed today. Benefits of this medication outweigh potential harm.  Patient has been encouraged to continue taking this medication.  .      Patient Active Problem List   Diagnosis     "Atrial fibrillation, chronic    Dyslipidemia    Essential hypertension    Type 2 diabetes mellitus with hyperglycemia, without long-term current use of insulin    Multinodular goiter (nontoxic)    Arthralgia of right hip    Primary osteoarthritis of right hip     Advance Care Planning   Advance Care Planning     Advance Directive is on file.  ACP discussion was held with the patient during this visit. Patient has an advance directive in EMR which is still valid.      Objective    Vitals:    23 1119   BP: 128/70   BP Location: Left arm   Patient Position: Sitting   Cuff Size: Adult   Pulse: 53   Resp: 16   Temp: 97.6 øF (36.4 øC)   TempSrc: Temporal   SpO2: 98%   Weight: 95.6 kg (210 lb 12.8 oz)   Height: 177.8 cm (70\")   PainSc:   4   PainLoc: Hip     Estimated body mass index is 30.25 kg/mý as calculated from the following:    Height as of this encounter: 177.8 cm (70\").    Weight as of this encounter: 95.6 kg (210 lb 12.8 oz).    BMI is >= 30 and <35. (Class 1 Obesity). The following options were offered after discussion;: exercise counseling/recommendations      Does the patient have evidence of cognitive impairment? No    Lab Results   Component Value Date    HGBA1C 7.30 (H) 2023        HEALTH RISK ASSESSMENT    Smoking Status:  Social History     Tobacco Use   Smoking Status Former    Packs/day: 2.00    Years: 15.00    Pack years: 30.00    Types: Cigarettes    Quit date: 1991    Years since quittin.6    Passive exposure: Past   Smokeless Tobacco Never     Alcohol Consumption:  Social History     Substance and Sexual Activity   Alcohol Use Never     Fall Risk Screen:    STEADI Fall Risk Assessment was completed, and patient is at HIGH risk for falls. Assessment completed on:2023    Depression Screenin/30/2023    11:15 AM   PHQ-2/PHQ-9 Depression Screening   Little Interest or Pleasure in Doing Things 0-->not at all   Feeling Down, Depressed or Hopeless 0-->not at all   PHQ-9: " Brief Depression Severity Measure Score 0       Health Habits and Functional and Cognitive Screenin/30/2023    11:14 AM   Functional & Cognitive Status   Do you have difficulty preparing food and eating? Yes   Do you have difficulty bathing yourself, getting dressed or grooming yourself? No   Do you have difficulty using the toilet? No   Do you have difficulty moving around from place to place? Yes   Do you have trouble with steps or getting out of a bed or a chair? Yes   Current Diet Well Balanced Diet   Dental Exam Up to date   Eye Exam Up to date   Exercise (times per week) 0 times per week   Current Exercises Include No Regular Exercise   Do you need help using the phone?  No   Are you deaf or do you have serious difficulty hearing?  Yes   Do you need help to go to places out of walking distance? No   Do you need help shopping? No   Do you need help preparing meals?  Yes   Do you need help with housework?  Yes   Do you need help with laundry? Yes   Do you need help taking your medications? No   Do you need help managing money? No   Do you ever drive or ride in a car without wearing a seat belt? No   Have you felt unusual stress, anger or loneliness in the last month? No   Who do you live with? Spouse   If you need help, do you have trouble finding someone available to you? Yes   Have you been bothered in the last four weeks by sexual problems? No   Do you have difficulty concentrating, remembering or making decisions? No       Age-appropriate Screening Schedule:  Refer to the list below for future screening recommendations based on patient's age, sex and/or medical conditions. Orders for these recommended tests are listed in the plan section. The patient has been provided with a written plan.    Health Maintenance   Topic Date Due    TDAP/TD VACCINES (1 - Tdap) 2019    DIABETIC FOOT EXAM  2021    DIABETIC EYE EXAM  02/10/2022    COLORECTAL CANCER SCREENING  2023    LIPID PANEL   "08/15/2023    URINE MICROALBUMIN  08/15/2023    BMI FOLLOWUP  08/15/2023    COVID-19 Vaccine (3 - Pfizer series) 02/29/2024 (Originally 6/24/2021)    ZOSTER VACCINE (2 of 3) 08/16/2029 (Originally 10/24/2014)    INFLUENZA VACCINE  10/01/2023    HEMOGLOBIN A1C  02/24/2024    ANNUAL WELLNESS VISIT  08/30/2024    HEPATITIS C SCREENING  Completed    Pneumococcal Vaccine 65+  Completed    AAA SCREEN (ONE-TIME)  Completed                  CMS Preventative Services Quick Reference  Risk Factors Identified During Encounter  Chronic Pain: NSAIDs per medication orders.  Fall Risk-High or Moderate: Information on Fall Prevention Shared in After Visit Summary and Sit to Stand Exercise Information Shared in After Visit Summary  The above risks/problems have been discussed with the patient.  Pertinent information has been shared with the patient in the After Visit Summary.  An After Visit Summary and PPPS were made available to the patient.    Follow Up:   Next Medicare Wellness visit to be scheduled in 1 year.       Additional E&M Note during same encounter follows:  Patient has multiple medical problems which are significant and separately identifiable that require additional work above and beyond the Medicare Wellness Visit.      Chief Complaint  Medicare Wellness-subsequent (AWV and preventive care ) and Diabetes    Subjective        Patient comes in today with his wife for follow-up.  He is scheduled for right hip replacement.  He is looking forward to being more physically active.  Reports stopping metformin in the last couple of months and feels better off of the medicine.  Continues glimepiride.  Trying to watch his diet.    Yehuda Capellan is also being seen today for follow up of chronic conditions.         Objective   Vital Signs:  /70 (BP Location: Left arm, Patient Position: Sitting, Cuff Size: Adult)   Pulse 53   Temp 97.6 øF (36.4 øC) (Temporal)   Resp 16   Ht 177.8 cm (70\")   Wt 95.6 kg (210 lb 12.8 " oz)   SpO2 98%   BMI 30.25 kg/mý     Physical Exam  Vitals and nursing note reviewed.   Constitutional:       General: He is not in acute distress.     Appearance: Normal appearance. He is well-developed and well-groomed. He is obese. He is not ill-appearing, toxic-appearing or diaphoretic.   HENT:      Head: Normocephalic and atraumatic.      Right Ear: Hearing normal.      Left Ear: Hearing normal.   Eyes:      General: Lids are normal. No scleral icterus.        Right eye: No discharge.         Left eye: No discharge.      Extraocular Movements: Extraocular movements intact.   Cardiovascular:      Rate and Rhythm: Normal rate and regular rhythm.   Pulmonary:      Effort: Pulmonary effort is normal.      Breath sounds: Normal breath sounds.   Musculoskeletal:      Cervical back: Neck supple.   Skin:     Coloration: Skin is not jaundiced or pale.   Neurological:      General: No focal deficit present.      Mental Status: He is alert and oriented to person, place, and time.      Gait: Gait abnormal (using walker).   Psychiatric:         Attention and Perception: Attention and perception normal.         Mood and Affect: Mood and affect normal.         Speech: Speech normal.         Behavior: Behavior normal. Behavior is cooperative.         Thought Content: Thought content normal.         Cognition and Memory: Cognition and memory normal.         Judgment: Judgment normal.        The following data was reviewed by: Cindy Zhang MD on 08/30/2023:  Lab Results   Component Value Date    HGBA1C 7.30 (H) 08/24/2023    HGBA1C 7.6 05/30/2023    HGBA1C 8.0 02/21/2023      Lab Results   Component Value Date    CHLPL 118 08/15/2022    TRIG 210 (H) 08/15/2022    HDL 34 (L) 08/15/2022    LDL 50 08/15/2022                   Assessment and Plan   Diagnoses and all orders for this visit:    1. Medicare annual wellness visit, subsequent (Primary)    2. Type 2 diabetes mellitus with hyperglycemia, without long-term current  use of insulin  -     Hemoglobin A1c; Future  -     Lipid Panel; Future  -     TSH Rfx On Abnormal To Free T4; Future  -     atorvastatin (LIPITOR) 20 MG tablet; Take 1 tablet by mouth Every Night. To lower cholesterol and risk of stroke.  Dispense: 90 tablet; Refill: 3    3. Neuropathy due to medical condition  Comments:  related to diabetes  Orders:  -     Hemoglobin A1c; Future  -     Vitamin D,25-Hydroxy; Future  -     CBC (No Diff); Future  -     Comprehensive Metabolic Panel; Future  -     Vitamin B12; Future  -     Vitamin B6; Future  -     TSH Rfx On Abnormal To Free T4; Future    4. Atrial fibrillation, chronic  -     CBC (No Diff); Future  -     Comprehensive Metabolic Panel; Future  -     TSH Rfx On Abnormal To Free T4; Future    5. Essential hypertension  -     CBC (No Diff); Future  -     Comprehensive Metabolic Panel; Future  -     TSH Rfx On Abnormal To Free T4; Future    6. Vitamin D deficiency  -     Vitamin D,25-Hydroxy; Future    7. B12 deficiency  -     CBC (No Diff); Future  -     Vitamin B12; Future  -     Vitamin B6; Future    8. Class 1 obesity due to excess calories with serious comorbidity and body mass index (BMI) of 30.0 to 30.9 in adult  -     Vitamin D,25-Hydroxy; Future    9. Abnormal blood chemistry  -     Hemoglobin A1c; Future  -     Lipid Panel; Future  -     Vitamin D,25-Hydroxy; Future  -     CBC (No Diff); Future  -     Comprehensive Metabolic Panel; Future  -     Vitamin B12; Future  -     Vitamin B6; Future  -     TSH Rfx On Abnormal To Free T4; Future    10. Medication monitoring encounter  -     Hemoglobin A1c; Future  -     Lipid Panel; Future  -     Vitamin D,25-Hydroxy; Future  -     CBC (No Diff); Future  -     Comprehensive Metabolic Panel; Future  -     Vitamin B12; Future  -     Vitamin B6; Future  -     TSH Rfx On Abnormal To Free T4; Future    11. At moderate risk for fall  Comments:  info via avs    Patient came off metformin and feels better.  I encouraged him to  continue work on lifestyle measures.  A1c is lower on his most recent check than it was previously.  We may be able to keep him off of further diabetes medicines as long as he lives a healthy lifestyle.  Watch for hypoglycemia with glimepiride.  After the hip replacement he may be able to move more which could help his sugars as well.  Discussed labs prior to next visit, discussed pros and cons of PSA and we will not check that.    Counseling/anticipatory guidance/risk factor interventions for age provided. See AVS.        Follow Up   Return in about 3 months (around 12/3/2023) for Diabetes follow up, labs fasting 11/24 or after (at least few days prior to appt).  Patient was given instructions and counseling regarding his condition or for health maintenance advice. Please see specific information pulled into the AVS if appropriate.

## 2023-09-05 ENCOUNTER — ANESTHESIA EVENT CONVERTED (OUTPATIENT)
Dept: ANESTHESIOLOGY | Facility: HOSPITAL | Age: 68
End: 2023-09-05
Payer: MEDICARE

## 2023-09-05 ENCOUNTER — ANESTHESIA (OUTPATIENT)
Dept: PERIOP | Facility: HOSPITAL | Age: 68
End: 2023-09-05
Payer: MEDICARE

## 2023-09-05 ENCOUNTER — ANESTHESIA EVENT (OUTPATIENT)
Dept: PERIOP | Facility: HOSPITAL | Age: 68
End: 2023-09-05
Payer: MEDICARE

## 2023-09-05 ENCOUNTER — HOSPITAL ENCOUNTER (OUTPATIENT)
Facility: HOSPITAL | Age: 68
Setting detail: SURGERY ADMIT
Discharge: HOME | End: 2023-09-05
Attending: ORTHOPAEDIC SURGERY | Admitting: ORTHOPAEDIC SURGERY
Payer: MEDICARE

## 2023-09-05 VITALS
OXYGEN SATURATION: 95 % | SYSTOLIC BLOOD PRESSURE: 133 MMHG | TEMPERATURE: 97.4 F | HEART RATE: 70 BPM | DIASTOLIC BLOOD PRESSURE: 75 MMHG | RESPIRATION RATE: 18 BRPM

## 2023-09-05 DIAGNOSIS — M25.551 ARTHRALGIA OF RIGHT HIP: ICD-10-CM

## 2023-09-05 DIAGNOSIS — M16.11 PRIMARY OSTEOARTHRITIS OF RIGHT HIP: ICD-10-CM

## 2023-09-05 LAB
BACTERIA UR QL AUTO: ABNORMAL /HPF
BILIRUB UR QL STRIP: NEGATIVE
CLARITY UR: ABNORMAL
COLOR UR: YELLOW
GLUCOSE BLDC GLUCOMTR-MCNC: 192 MG/DL (ref 70–130)
GLUCOSE UR STRIP-MCNC: NEGATIVE MG/DL
HGB UR QL STRIP.AUTO: ABNORMAL
HYALINE CASTS UR QL AUTO: ABNORMAL /LPF
KETONES UR QL STRIP: ABNORMAL
LEUKOCYTE ESTERASE UR QL STRIP.AUTO: ABNORMAL
MUCOUS THREADS URNS QL MICRO: ABNORMAL /HPF
NITRITE UR QL STRIP: NEGATIVE
PH UR STRIP.AUTO: 5.5 [PH] (ref 5–8)
PROT UR QL STRIP: ABNORMAL
RBC # UR STRIP: ABNORMAL /HPF
REF LAB TEST METHOD: ABNORMAL
SP GR UR STRIP: 1.02 (ref 1–1.03)
SQUAMOUS #/AREA URNS HPF: ABNORMAL /HPF
UROBILINOGEN UR QL STRIP: ABNORMAL
WBC # UR STRIP: ABNORMAL /HPF
WBC CLUMPS # UR AUTO: ABNORMAL /HPF

## 2023-09-05 PROCEDURE — 82948 REAGENT STRIP/BLOOD GLUCOSE: CPT

## 2023-09-05 PROCEDURE — 81001 URINALYSIS AUTO W/SCOPE: CPT | Performed by: ORTHOPAEDIC SURGERY

## 2023-09-05 PROCEDURE — G0463 HOSPITAL OUTPT CLINIC VISIT: HCPCS | Performed by: ORTHOPAEDIC SURGERY

## 2023-09-05 RX ORDER — SODIUM CHLORIDE, SODIUM LACTATE, POTASSIUM CHLORIDE, CALCIUM CHLORIDE 600; 310; 30; 20 MG/100ML; MG/100ML; MG/100ML; MG/100ML
1000 INJECTION, SOLUTION INTRAVENOUS CONTINUOUS
Status: DISCONTINUED | OUTPATIENT
Start: 2023-09-05 | End: 2023-09-05 | Stop reason: HOSPADM

## 2023-09-05 RX ORDER — FAMOTIDINE 10 MG/ML
20 INJECTION, SOLUTION INTRAVENOUS
Status: COMPLETED | OUTPATIENT
Start: 2023-09-05 | End: 2023-09-05

## 2023-09-05 RX ORDER — TRANEXAMIC ACID 10 MG/ML
1000 INJECTION, SOLUTION INTRAVENOUS
Status: DISCONTINUED | OUTPATIENT
Start: 2023-09-05 | End: 2023-09-05 | Stop reason: HOSPADM

## 2023-09-05 RX ORDER — CEFAZOLIN SODIUM 2 G/50ML
2 SOLUTION INTRAVENOUS
Status: DISCONTINUED | OUTPATIENT
Start: 2023-09-05 | End: 2023-09-05 | Stop reason: HOSPADM

## 2023-09-05 RX ADMIN — SODIUM CHLORIDE, POTASSIUM CHLORIDE, SODIUM LACTATE AND CALCIUM CHLORIDE 1000 ML: 600; 310; 30; 20 INJECTION, SOLUTION INTRAVENOUS at 06:46

## 2023-09-05 RX ADMIN — FAMOTIDINE 20 MG: 10 INJECTION, SOLUTION INTRAVENOUS at 06:54

## 2023-09-05 NOTE — INTERVAL H&P NOTE
H&P reviewed. The patient was examined and there are no changes to the H&P.    Vitals:    09/05/23 0636   BP: 133/75   Pulse: 70   Resp: 18   Temp: 97.4 °F (36.3 °C)   SpO2: 95%     Patient repeat UA after treatment with Macrobid shows mild improvement though persistent gross 3+ bacteria and 21-30 WBC per hpf.  He does tell me he has persistent stinging with urination, and variable urine odor and cloudiness.  Advised Urology consult and to reschedule elective right total hip replacement after further urological treatment and recovery.      Modesto Dickens MD  9/5/2023  08:21 EDT

## 2023-09-05 NOTE — NURSING NOTE
Ask patient to give me a urine. I told him to wipe with towel kiah then void some. Then void in cup. Be sure not to contaminate the urine.

## 2023-09-05 NOTE — NURSING NOTE
Dr. Dickens in to see Pt, surgery is cancelled per urinalysis results. Dr. Dickens's office is going to make a referral to urology. Surgery will be rescheduled pending  outcome of urology consult.

## 2023-09-05 NOTE — ANESTHESIA PREPROCEDURE EVALUATION
Anesthesia Evaluation     Patient summary reviewed and Nursing notes reviewed   NPO Solid Status: > 8 hours  NPO Liquid Status: > 8 hours           Airway   Mallampati: II  TM distance: >3 FB  Neck ROM: full  Possible difficult intubation  Dental - normal exam   (+) edentulous    Pulmonary - normal exam   (+) a smoker Former Abstained day of surgery,  Cardiovascular - normal exam    ECG reviewed  PT is on anticoagulation therapy  Patient on routine beta blocker    (+) hypertension well controlled 2 medications or greater, dysrhythmias Atrial Fib, hyperlipidemia      Neuro/Psych  GI/Hepatic/Renal/Endo    (+) obesity, diabetes mellitus type 2 well controlled, thyroid problem thyroid nodules    Musculoskeletal     Abdominal  - normal exam   Substance History      OB/GYN          Other   arthritis,     ROS/Med Hx Other: 1.  Normal left ventricular size and systolic function, LVEF 55-60%.  2.  Mild concentric LVH.  3.  Grade 1 diastolic dysfunction.  4.  Severely increased left atrial volume index.  5.  Normal right ventricular size and systolic function.  6.  Mild calcification aortic valve without significant stenosis.  7.  Trace AI.  8.  Mild MR.                  Anesthesia Plan    ASA 3     spinal     (Risks of spinal anesthesia discussed , including but not limited to:  Headache, itching, nausea and vomiting, infection, failure of the block, decreased BP, permanent chronic back pain, nerve damage, paralysis, etc.      Patient told that a low dose narcotic, such as morphine, may be added to the spinal local anesthetic which can provide 12-15 hours of pain relief after the spinal block wears off. Side effects can be itching, nausea/vomitting, but medicine will be available to treat these symptoms if necessary.    FI/PENG block to be placed post operatively for POPC.    All questions answered and informed consent obtained.  )  intravenous induction     Anesthetic plan, risks, benefits, and alternatives have been  provided, discussed and informed consent has been obtained with: patient.  Pre-procedure education provided  Plan discussed with CRNA.    CODE STATUS:

## 2023-09-06 ENCOUNTER — TELEPHONE (OUTPATIENT)
Dept: ORTHOPEDIC SURGERY | Facility: CLINIC | Age: 68
End: 2023-09-06
Payer: MEDICARE

## 2023-09-06 DIAGNOSIS — A49.9 UTI (URINARY TRACT INFECTION), BACTERIAL: Primary | ICD-10-CM

## 2023-09-06 DIAGNOSIS — N39.0 UTI (URINARY TRACT INFECTION), BACTERIAL: Primary | ICD-10-CM

## 2023-09-06 NOTE — TELEPHONE ENCOUNTER
Patient contacted our office regarding a follow up from where his surgery was cancelled yesterday.  I relayed Allegra's message to the patient. Dr. Dickens would like to do a referral with Dr. Hernandez in Urology. I informed the patient that he should be receiving a call to schedule that appt. Thank you.

## 2023-09-09 ENCOUNTER — OFFICE VISIT (OUTPATIENT)
Dept: INTERNAL MEDICINE | Facility: CLINIC | Age: 68
End: 2023-09-09
Payer: MEDICARE

## 2023-09-09 VITALS
OXYGEN SATURATION: 97 % | BODY MASS INDEX: 30.49 KG/M2 | DIASTOLIC BLOOD PRESSURE: 82 MMHG | WEIGHT: 213 LBS | SYSTOLIC BLOOD PRESSURE: 122 MMHG | HEART RATE: 74 BPM | TEMPERATURE: 98 F | HEIGHT: 70 IN

## 2023-09-09 DIAGNOSIS — N39.0 URINARY TRACT INFECTION WITHOUT HEMATURIA, SITE UNSPECIFIED: Primary | ICD-10-CM

## 2023-09-09 DIAGNOSIS — E11.29 MICROALBUMINURIA DUE TO TYPE 2 DIABETES MELLITUS: ICD-10-CM

## 2023-09-09 DIAGNOSIS — E11.65 TYPE 2 DIABETES MELLITUS WITH HYPERGLYCEMIA, WITHOUT LONG-TERM CURRENT USE OF INSULIN: ICD-10-CM

## 2023-09-09 DIAGNOSIS — Z91.81 AT HIGH RISK FOR FALLS: ICD-10-CM

## 2023-09-09 DIAGNOSIS — R80.9 MICROALBUMINURIA DUE TO TYPE 2 DIABETES MELLITUS: ICD-10-CM

## 2023-09-09 LAB
BILIRUB BLD-MCNC: NEGATIVE MG/DL
CLARITY, POC: ABNORMAL
COLOR UR: ABNORMAL
EXPIRATION DATE: ABNORMAL
EXPIRATION DATE: NORMAL
GLUCOSE UR STRIP-MCNC: ABNORMAL MG/DL
KETONES UR QL: NEGATIVE
LEUKOCYTE EST, POC: ABNORMAL
Lab: ABNORMAL
Lab: NORMAL
NITRITE UR-MCNC: NEGATIVE MG/ML
PH UR: 6 [PH] (ref 5–8)
POC CREATININE URINE: 300
POC MICROALBUMIN URINE: 80
PROT UR STRIP-MCNC: ABNORMAL MG/DL
RBC # UR STRIP: ABNORMAL /UL
SP GR UR: 1.03 (ref 1–1.03)
UROBILINOGEN UR QL: NORMAL

## 2023-09-09 PROCEDURE — 1160F RVW MEDS BY RX/DR IN RCRD: CPT | Performed by: FAMILY MEDICINE

## 2023-09-09 PROCEDURE — 3051F HG A1C>EQUAL 7.0%<8.0%: CPT | Performed by: FAMILY MEDICINE

## 2023-09-09 PROCEDURE — 82044 UR ALBUMIN SEMIQUANTITATIVE: CPT | Performed by: FAMILY MEDICINE

## 2023-09-09 PROCEDURE — 99214 OFFICE O/P EST MOD 30 MIN: CPT | Performed by: FAMILY MEDICINE

## 2023-09-09 PROCEDURE — 3074F SYST BP LT 130 MM HG: CPT | Performed by: FAMILY MEDICINE

## 2023-09-09 PROCEDURE — 3079F DIAST BP 80-89 MM HG: CPT | Performed by: FAMILY MEDICINE

## 2023-09-09 PROCEDURE — 81003 URINALYSIS AUTO W/O SCOPE: CPT | Performed by: FAMILY MEDICINE

## 2023-09-09 PROCEDURE — 1159F MED LIST DOCD IN RCRD: CPT | Performed by: FAMILY MEDICINE

## 2023-09-09 RX ORDER — CEFUROXIME AXETIL 500 MG/1
500 TABLET ORAL 2 TIMES DAILY
Qty: 14 TABLET | Refills: 0 | Status: SHIPPED | OUTPATIENT
Start: 2023-09-09 | End: 2023-09-16

## 2023-09-09 NOTE — PATIENT INSTRUCTIONS
Fall Prevention in the Home, Adult  Falls can cause injuries and affect people of all ages. There are many simple things that you can do to make your home safe and to help prevent falls. Ask for help when making these changes, if needed.  What actions can I take to prevent falls?  General instructions  Use good lighting in all rooms. Replace any light bulbs that burn out, turn on lights if it is dark, and use night-lights.  Place frequently used items in easy-to-reach places. Lower the shelves around your home if necessary.  Set up furniture so that there are clear paths around it. Avoid moving your furniture around.  Remove throw rugs and other tripping hazards from the floor.  Avoid walking on wet floors.  Fix any uneven floor surfaces.  Add color or contrast paint or tape to grab bars and handrails in your home. Place contrasting color strips on the first and last steps of staircases.  When you use a stepladder, make sure that it is completely opened and that the sides and supports are firmly locked. Have someone hold the ladder while you are using it. Do not climb a closed stepladder.  Know where your pets are when moving through your home.  What can I do in the bathroom?         Keep the floor dry. Immediately clean up any water that is on the floor.  Remove soap buildup in the tub or shower regularly.  Use nonskid mats or decals on the floor of the tub or shower.  Attach bath mats securely with double-sided, nonslip rug tape.  If you need to sit down while you are in the shower, use a plastic, nonslip stool.  Install grab bars by the toilet and in the tub and shower. Do not use towel bars as grab bars.  What can I do in the bedroom?  Make sure that a bedside light is easy to reach.  Do not use oversized bedding that reaches the floor.  Have a firm chair that has side arms to use for getting dressed.  What can I do in the kitchen?  Clean up any spills right away.  If you need to reach for something above you,  use a sturdy step stool that has a grab bar.  Keep electrical cables out of the way.  Do not use floor polish or wax that makes floors slippery. If you must use wax, make sure that it is non-skid floor wax.  What can I do with my stairs?  Do not leave any items on the stairs.  Make sure that you have a light switch at the top and the bottom of the stairs. Have them installed if you do not have them.  Make sure that there are handrails on both sides of the stairs. Fix handrails that are broken or loose. Make sure that handrails are as long as the staircases.  Install non-slip stair treads on all stairs in your home.  Avoid having throw rugs at the top or bottom of stairs, or secure the rugs with carpet tape to prevent them from moving.  Choose a carpet design that does not hide the edge of steps on the stairs.  Check any carpeting to make sure that it is firmly attached to the stairs. Fix any carpet that is loose or worn.  What can I do on the outside of my home?  Use bright outdoor lighting.  Regularly repair the edges of walkways and driveways and fix any cracks.  Remove high doorway thresholds.  Trim any shrubbery on the main path into your home.  Regularly check that handrails are securely fastened and in good repair. Both sides of all steps should have handrails.  Install guardrails along the edges of any raised decks or porches.  Clear walkways of debris and clutter, including tools and rocks.  Have leaves, snow, and ice cleared regularly.  Use sand or salt on walkways during winter months.  In the garage, clean up any spills right away, including grease or oil spills.  What other actions can I take?  Wear closed-toe shoes that fit well and support your feet. Wear shoes that have rubber soles or low heels.  Use mobility aids as needed, such as canes, walkers, scooters, and crutches.  Review your medicines with your health care provider. Some medicines can cause dizziness or changes in blood pressure, which  increase your risk of falling.  Talk with your health care provider about other ways that you can decrease your risk of falls. This may include working with a physical therapist or  to improve your strength, balance, and endurance.  Where to find more information  Centers for Disease Control and [place]: www.cdc.gov  [employer] [place] on Aging: www.[place].nih.gov  Contact a health care provider if:  You are afraid of falling at home.  You feel weak, drowsy, or dizzy at home.  You fall at home.  Summary  There are many simple things that you can do to make your home safe and to help prevent falls.  Ways to make your home safe include removing tripping hazards and installing grab bars in the bathroom.  Ask for help when making these changes in your home.  This information is not intended to replace advice given to you by your health care provider. Make sure you discuss any questions you have with your health care provider.  Document Revised: 09/19/2022 Document Reviewed: 07/21/2021  [employer] Patient Education © 2023 [employer]    Sit-to-Stand Exercise    The sit-to-stand exercise (also known as the chair stand or chair rise exercise) strengthens your lower body and helps you maintain or improve your mobility and independence. The end goal is to do the sit-to-stand exercise without using your hands. This will be easier as you become stronger. You should always talk with your health care provider before starting any exercise program, especially if you have had recent surgery.  Do the exercise exactly as told by your health care provider and adjust it as directed. It is normal to feel mild stretching, pulling, tightness, or discomfort as you do this exercise, but you should stop right away if you feel sudden pain or your pain gets worse. Do not begin doing this exercise until told by your health care provider.  What the sit-to-stand exercise does  The sit-to-stand exercise helps to strengthen the  muscles in your thighs and the muscles in the center of your body that give you stability (core muscles). This exercise is especially helpful if:  You have had knee or hip surgery.  You have trouble getting up from a chair, out of a car, or off the toilet due to muscle weakness.  How to do the sit-to-stand exercise  Sit toward the front edge of a sturdy chair without armrests. Your knees should be bent and your feet should be flat on the floor and shoulder-width apart and underneath your hips.  Place your hands lightly on each side of the seat. Keep your back and neck as straight as possible, with your chest slightly forward.  Breathe in slowly. Lean forward and slightly shift your weight to the front of your feet.  Breathe out as you slowly stand up. Try not to support any weight with your hands.  Stand and pause for a full breath in and out.  Breathe in as you sit down slowly. Tighten your core and abdominal muscles to control your lowering as much as possible. You should lower yourself back to the chair slowly, not just drop back into the seat.  Breathe out slowly.  Do this exercise 10-15 times. If needed, do it fewer times until you build up strength.  Rest for 1 minute, then do another set of 10-15 repetitions.  To change the difficulty of the sit-to-stand exercise  If the exercise is too difficult, use a chair with sturdy armrests, and push off the armrests to help you come to the standing position. You can also use the armrests to help slowly lower yourself back to sitting. As this gets easier, try to use your arms less. You can also place a firm cushion or pillow on the chair to make the surface higher.  If this exercise is too easy, do not use your arms to help raise or lower yourself. You can also wear a weighted vest, use hand weights, increase your repetitions, or try a lower chair.  General tips  You may feel tired when starting an exercise routine. This is normal.  You may have muscle soreness that  lasts a few days. This is normal. As you get stronger, you may not feel muscle soreness.  Use smooth, steady movements.  Do not  hold your breath during strength exercises. This can cause unsafe changes in your blood pressure.  Breathe in slowly through your nose, and breathe out slowly through your mouth.  Summary  Strengthening your lower body is an important step to help you move safely and independently.  The sit-to-stand exercise helps strengthen the muscles in your thighs and core.  You should always talk with your health care provider before starting any exercise program, especially if you have had recent surgery.  This information is not intended to replace advice given to you by your health care provider. Make sure you discuss any questions you have with your health care provider.  Document Revised: 04/10/2022 Document Reviewed: 04/10/2022  [employer] Patient Education © 2023 [employer]    Exercising to Stay Healthy  To become healthy and stay healthy, it is recommended that you do moderate-intensity and vigorous-intensity exercise. You can tell that you are exercising at a moderate intensity if your heart starts beating faster and you start breathing faster but can still hold a conversation. You can tell that you are exercising at a vigorous intensity if you are breathing much harder and faster and cannot hold a conversation while exercising.  How can exercise benefit me?  Exercising regularly is important. It has many health benefits, such as:  Improving overall fitness, flexibility, and endurance.  Increasing bone density.  Helping with weight control.  Decreasing body fat.  Increasing muscle strength and endurance.  Reducing stress and tension, anxiety, depression, or anger.  Improving overall health.  What guidelines should I follow while exercising?  Before you start a new exercise program, talk with your health care provider.  Do not exercise so much that you hurt yourself, feel dizzy, or get very  short of breath.  Wear comfortable clothes and wear shoes with good support.  Drink plenty of water while you exercise to prevent dehydration or heat stroke.  Work out until your breathing and your heartbeat get faster (moderate intensity).  How often should I exercise?  Choose an activity that you enjoy, and set realistic goals. Your health care provider can help you make an activity plan that is individually designed and works best for you.  Exercise regularly as told by your health care provider. This may include:  Doing strength training two times a week, such as:  Lifting weights.  Using resistance bands.  Push-ups.  Sit-ups.  Yoga.  Doing a certain intensity of exercise for a given amount of time. Choose from these options:  A total of 150 minutes of moderate-intensity exercise every week.  A total of 75 minutes of vigorous-intensity exercise every week.  A mix of moderate-intensity and vigorous-intensity exercise every week.  Children, pregnant women, people who have not exercised regularly, people who are overweight, and older adults may need to talk with a health care provider about what activities are safe to perform. If you have a medical condition, be sure to talk with your health care provider before you start a new exercise program.  What are some exercise ideas?  Moderate-intensity exercise ideas include:  Walking 1 mile (1.6 km) in about 15 minutes.  Biking.  Hiking.  Golfing.  Dancing.  Water aerobics.  Vigorous-intensity exercise ideas include:  Walking 4.5 miles (7.2 km) or more in about 1 hour.  Jogging or running 5 miles (8 km) in about 1 hour.  Biking 10 miles (16.1 km) or more in about 1 hour.  Lap swimming.  Roller-skating or in-line skating.  Cross-country skiing.  Vigorous competitive sports, such as football, basketball, and soccer.  Jumping rope.  Aerobic dancing.  What are some everyday activities that can help me get exercise?  Yard work, such as:  Pushing a .  Raking and  bagging leaves.  Washing your car.  Pushing a stroller.  Shoveling snow.  Gardening.  Washing windows or floors.  How can I be more active in my day-to-day activities?  Use stairs instead of an elevator.  Take a walk during your lunch break.  If you drive, park your car farther away from your work or school.  If you take public transportation, get off one stop early and walk the rest of the way.  Stand up or walk around during all of your indoor phone calls.  Get up, stretch, and walk around every 30 minutes throughout the day.  Enjoy exercise with a friend. Support to continue exercising will help you keep a regular routine of activity.  Where to find more information  You can find more information about exercising to stay healthy from:  [place] Department of Health and Human Services: www.hhs.gov  Centers for Disease Control and Prevention (CDC): www.cdc.gov  Summary  Exercising regularly is important. It will improve your overall fitness, flexibility, and endurance.  Regular exercise will also improve your overall health. It can help you control your weight, reduce stress, and improve your bone density.  Do not exercise so much that you hurt yourself, feel dizzy, or get very short of breath.  Before you start a new exercise program, talk with your health care provider.  This information is not intended to replace advice given to you by your health care provider. Make sure you discuss any questions you have with your health care provider.  Document Revised: 04/15/2022 Document Reviewed: 04/15/2022  Elsevier Patient Education © 2023 [employer].

## 2023-09-09 NOTE — PROGRESS NOTES
"Chief Complaint  Urinary Tract Infection (Was denied surgery due to UTI. Was sent to [facility], who told patient he just needs to go to his PCP. )  Answers submitted by the patient for this visit:  Primary Reason for Visit (Submitted on 9/8/2023)  What is the primary reason for your visit?: Other  Other (Submitted on 9/8/2023)  Please describe your symptoms.: Uti  Have you had these symptoms before?: No  How long have you been having these symptoms?: 5-7 days  Please list any medications you are currently taking for this condition.: N/a  Please describe any probable cause for these symptoms. : N/a    Subjective        Senate MARILYNN Capellan presents to [facility]  History of Present Illness  Disappointed he hasn't been able to get his orthopedic surgery. Was on antibiotic already (Macrobid) yet still testing positive for UTI. Ortho sent a referral to urology but Dr. Hernandez's office said he could just see his PCP for this issue. No dysuria. Has frequency but drinks a lot of water.    Objective   Vital Signs:  /82 (BP Location: Right arm, Patient Position: Sitting, Cuff Size: Adult)   Pulse 74   Temp 98 °F (36.7 °C)   Ht 177.8 cm (70\")   Wt 96.6 kg (213 lb)   SpO2 97%   BMI 30.56 kg/m²   Estimated body mass index is 30.56 kg/m² as calculated from the following:    Height as of this encounter: 177.8 cm (70\").    Weight as of this encounter: 96.6 kg (213 lb).               Physical Exam  Vitals and nursing note reviewed.   Constitutional:       General: He is not in acute distress.     Appearance: Normal appearance. He is well-developed and well-groomed. He is not ill-appearing, toxic-appearing or diaphoretic.   HENT:      Head: Normocephalic and atraumatic.      Right Ear: Hearing normal.      Left Ear: Hearing normal.   Eyes:      General: Lids are normal. No scleral icterus.        Right eye: No discharge.         Left eye: No discharge.      Extraocular Movements: Extraocular movements " intact.   Pulmonary:      Effort: Pulmonary effort is normal.   Musculoskeletal:      Cervical back: Neck supple.   Skin:     Coloration: Skin is not jaundiced or pale.   Neurological:      General: No focal deficit present.      Mental Status: He is alert and oriented to person, place, and time.      Gait: Gait abnormal (using walker, same as last visit).   Psychiatric:         Attention and Perception: Attention and perception normal.         Mood and Affect: Mood and affect normal.         Speech: Speech normal.         Behavior: Behavior normal. Behavior is cooperative.         Thought Content: Thought content normal.         Cognition and Memory: Cognition and memory normal.         Judgment: Judgment normal.      Result Review :  The following data was reviewed by: Cindy Zhang MD on 09/09/2023:  No visits with results within 1 Day(s) from this visit.   Latest known visit with results is:   Admission on 09/05/2023, Discharged on 09/05/2023   Component Date Value Ref Range Status    Color, UA 09/05/2023 Yellow  Yellow, Straw Final    Appearance, UA 09/05/2023 Cloudy (A)  Clear Final    pH, UA 09/05/2023 5.5  5.0 - 8.0 Final    Specific Gravity, UA 09/05/2023 1.024  1.005 - 1.030 Final    Glucose, UA 09/05/2023 Negative  Negative Final    Ketones, UA 09/05/2023 Trace (A)  Negative Final    Bilirubin, UA 09/05/2023 Negative  Negative Final    Blood, UA 09/05/2023 Trace (A)  Negative Final    Protein, UA 09/05/2023 30 mg/dL (1+) (A)  Negative Final    Leuk Esterase, UA 09/05/2023 Moderate (2+) (A)  Negative Final    Nitrite, UA 09/05/2023 Negative  Negative Final    Urobilinogen, UA 09/05/2023 1.0 E.U./dL  0.2 - 1.0 E.U./dL Final    RBC, UA 09/05/2023 0-2 (A)  None Seen /HPF Final    WBC, UA 09/05/2023 21-30 (A)  None Seen /HPF Final    Bacteria, UA 09/05/2023 3+ (A)  None Seen /HPF Final    Squamous Epithelial Cells, UA 09/05/2023 0-2  None Seen, 0-2 /HPF Final    Hyaline Casts, UA 09/05/2023 None Seen   None Seen /LPF Final    Mucus, UA 09/05/2023 Moderate/2+ (A)  None Seen, Trace /HPF Final    WBC Clumps, UA 09/05/2023 Small/1+  None Seen /HPF Final    Methodology 09/05/2023 Manual Light Microscopy   Final    Glucose 09/05/2023 192 (H)  70 - 130 mg/dL Final    Serial Number: [id number]:  [id number]      Office Visit on 09/09/2023   Component Date Value Ref Range Status    Color 09/09/2023 Aileen  Yellow, Straw, Dark Yellow, Aileen Final    Clarity, UA 09/09/2023 Cloudy (A)  Clear Final    Specific Gravity  09/09/2023 1.030  1.005 - 1.030 Final    pH, Urine 09/09/2023 6.0  5.0 - 8.0 Final    Leukocytes 09/09/2023 Small (1+) (A)  Negative Final    Nitrite, UA 09/09/2023 Negative  Negative Final    Protein, POC 09/09/2023 Trace (A)  Negative mg/dL Final    Glucose, UA 09/09/2023 3+ (A)  Negative mg/dL Final    Ketones, UA 09/09/2023 Negative  Negative Final    Urobilinogen, UA 09/09/2023 Normal  Normal, 0.2 E.U./dL Final    Bilirubin 09/09/2023 Negative  Negative Final    Blood, UA 09/09/2023 Small (A)  Negative Final    Lot Number 09/09/2023 [id number]   Final    Expiration Date 09/09/2023 03/25/2024   Final    Microalbumin, Urine 09/09/2023 80   Final    Creatinine, Urine 09/09/2023 300   Final    Lot Number 09/09/2023 [id number]   Final    Expiration Date 09/09/2023 10/31/2023   Final                     Assessment and Plan   Diagnoses and all orders for this visit:    1. Urinary tract infection without hematuria, site unspecified (Primary)  -     POCT urinalysis dipstick, automated  -     Urine Culture - , Urine, Clean Catch  -     cefuroxime (CEFTIN) 500 MG tablet; Take 1 tablet by mouth 2 (Two) Times a Day for 7 days.  Dispense: 14 tablet; Refill: 0  -     Urine Culture - , Urine, Clean Catch; Future  -     POC Urinalysis Dipstick, Automated; Future    2. Type 2 diabetes mellitus with hyperglycemia, without long-term current use of insulin  -     POCT microalbumin    3. Microalbuminuria due to type 2  diabetes mellitus  Assessment & Plan:  Tested today as we had urine sample available. Elevated protein. Stay on losartan for kidney protection.    Orders:  -     POCT microalbumin    4. At high risk for falls  Comments:  info via avs      Treating with different antibiotic. Return to clinic in a week after completing antibiotic for repeat UA and urine culture. If still abnormal will refer to urology (Maisha).        Follow Up   Return for As scheduled previously.  Patient was given instructions and counseling regarding his condition or for health maintenance advice. Please see specific information pulled into the AVS if appropriate.

## 2023-09-16 LAB
BACTERIA UR CULT: ABNORMAL
BACTERIA UR CULT: ABNORMAL
OTHER ANTIBIOTIC SUSC ISLT: ABNORMAL

## 2023-09-19 ENCOUNTER — CLINICAL SUPPORT (OUTPATIENT)
Dept: INTERNAL MEDICINE | Facility: CLINIC | Age: 68
End: 2023-09-19
Payer: MEDICARE

## 2023-09-25 DIAGNOSIS — N39.0 RECURRENT UTI: Primary | ICD-10-CM

## 2023-10-20 ENCOUNTER — OFFICE VISIT (OUTPATIENT)
Dept: UROLOGY | Facility: CLINIC | Age: 68
End: 2023-10-20
Payer: MEDICARE

## 2023-10-20 VITALS
SYSTOLIC BLOOD PRESSURE: 130 MMHG | HEART RATE: 81 BPM | BODY MASS INDEX: 30.49 KG/M2 | HEIGHT: 70 IN | WEIGHT: 213 LBS | DIASTOLIC BLOOD PRESSURE: 80 MMHG | OXYGEN SATURATION: 97 %

## 2023-10-20 DIAGNOSIS — N39.0 RECURRENT UTI: Primary | ICD-10-CM

## 2023-10-20 LAB
BILIRUB BLD-MCNC: NEGATIVE MG/DL
CLARITY, POC: ABNORMAL
COLOR UR: YELLOW
EXPIRATION DATE: ABNORMAL
GLUCOSE UR STRIP-MCNC: ABNORMAL MG/DL
KETONES UR QL: NEGATIVE
LEUKOCYTE EST, POC: ABNORMAL
Lab: ABNORMAL
NITRITE UR-MCNC: NEGATIVE MG/ML
PH UR: 6 [PH] (ref 5–8)
PROT UR STRIP-MCNC: ABNORMAL MG/DL
RBC # UR STRIP: NEGATIVE /UL
SP GR UR: 1 (ref 1–1.03)
UROBILINOGEN UR QL: NORMAL

## 2023-10-20 RX ORDER — TAMSULOSIN HYDROCHLORIDE 0.4 MG/1
1 CAPSULE ORAL DAILY
Qty: 90 CAPSULE | Refills: 3 | Status: SHIPPED | OUTPATIENT
Start: 2023-10-20 | End: 2024-10-14

## 2023-10-20 RX ORDER — CEFDINIR 300 MG/1
300 CAPSULE ORAL 2 TIMES DAILY
Qty: 28 CAPSULE | Refills: 0 | Status: SHIPPED | OUTPATIENT
Start: 2023-10-20 | End: 2023-11-03

## 2023-10-20 NOTE — PROGRESS NOTES
[facility] Male Office Visit      Patient Name: Yehuda Capellan  : 1955   MRN: [id number]     Chief Complaint:  Lower Urinary Tract Symptoms.   Chief Complaint   Patient presents with    Urinary Tract Infection        Referring Provider: Cindy Zhang MD    History of Present Illness: Mr. Capellan is a 68 y.o. male with history of lower urinary tract symptoms.  He reports he has not had any symptoms but has had a UTI for several months.  He states he has some frequency and urgency but he drinks a lot of fluid due to his dry mouth.    He reports his urine is usually cloudy.  He has never been on any medications before for his prostate.     No dysuria or gross hematuria.    He is not sexually active.   Patient has not had PSA or prostate cancer screening.  No exam has been done per his report.     Subjective      Review of System:   Review of Systems   Constitutional:  Positive for fatigue.   HENT: Negative.     Eyes: Negative.    Respiratory: Negative.     Cardiovascular: Negative.    Gastrointestinal: Negative.    Endocrine: Negative.    Genitourinary:  Positive for frequency.   Musculoskeletal: Negative.    Skin: Negative.    Allergic/Immunologic: Negative.    Neurological: Negative.    Hematological: Negative.    Psychiatric/Behavioral: Negative.        I have reviewed the ROS documented by my clinical staff, I have updated appropriately and I agree. Margarita Molina MD    Past Medical History:  Past Medical History:   Diagnosis Date    Allergic     Arthritis     Atrial fibrillation     Diabetes mellitus     Elevated cholesterol     Elevated glucose 2017    A1C 6.7    Hip fracture     right, at age 17    Hyperlipidemia     Hypertension     Microalbuminuria due to type 2 diabetes mellitus 2023    Obesity        Past Surgical History:  Past Surgical History:   Procedure Laterality Date    COLONOSCOPY      HAND SURGERY Left     LEFT PINKY SEWN BACK ON in [place] , cut off with table saw        Medications:    Current Outpatient Medications:     Accu-Chek Softclix Lancets lancets, TEST TWO TIMES A DAY FOR   DIABETES MELLITUS, Disp: 200 each, Rfl: 3    Alcohol Swabs (B-D SINGLE USE SWABS REGULAR) pads, USE 3 TIMES A DAY, Disp: 300 each, Rfl: 3    apixaban (Eliquis) 5 MG tablet tablet, Take 1 tablet by mouth Every 12 (Twelve) Hours., Disp: 180 tablet, Rfl: 2    atorvastatin (LIPITOR) 20 MG tablet, Take 1 tablet by mouth Every Night. To lower cholesterol and risk of stroke., Disp: 90 tablet, Rfl: 3    Blood Glucose Calibration (Accu-Chek Darcy) solution, Use as directed. E11.9, Disp: 1 each, Rfl: 3    cetirizine (zyrTEC) 10 MG tablet, Take 1 tablet by mouth Daily., Disp: , Rfl:     Cyanocobalamin (Vitamin B-12) 1000 MCG sublingual tablet, Place  under the tongue Daily., Disp: , Rfl:     fluorouracil (EFUDEX) 5 % cream, , Disp: , Rfl:     gabapentin (NEURONTIN) 300 MG capsule, TAKE 1 CAPSULE 4 TIMES     DAILY AS NEEDED FOR NERVE  PAIN (Patient taking differently: Take 1 capsule by mouth 4 (Four) Times a Day As Needed.), Disp: 360 capsule, Rfl: 1    glimepiride (AMARYL) 2 MG tablet, Take 1.5 tablets by mouth Daily., Disp: , Rfl:     glucose blood (Accu-Chek Darcy Plus) test strip, CHECK GLUCOSE TWO TIMES A  DAY FOR DIABETES MELLITUS, Disp: 200 each, Rfl: 3    glucose monitor monitoring kit, ACCU-CHECK DARCY PLUS METER as requested., Disp: 1 each, Rfl: 1    Lancets Misc. misc, TEST TWICE A DAY FOR DM. E11.9. patient requests acc-check softclick, Disp: 200 each, Rfl: 3    lidocaine (LIDODERM) 5 %, Place 1 patch on the skin as directed by provider Daily. Remove & Discard patch within 12 hours or as directed by MD, Disp: 30 patch, Rfl: 0    losartan-hydrochlorothiazide (HYZAAR) 100-12.5 MG per tablet, Take 1 tablet by mouth Daily. Indications: High Blood Pressure Disorder, Disp: 90 tablet, Rfl: 3    meloxicam (Mobic) 7.5 MG tablet, Take 1 tablet by mouth Daily., Disp: 14 tablet, Rfl: 0    metoprolol tartrate  (LOPRESSOR) 50 MG tablet, TAKE 1 TABLET TWICE A DAY (Patient taking differently: Take 1 tablet by mouth 2 (Two) Times a Day.), Disp: 90 tablet, Rfl: 3    multivitamin with minerals tablet tablet, Take 1 tablet by mouth Daily., Disp: , Rfl:     nitrofurantoin, macrocrystal-monohydrate, (Macrobid) 100 MG capsule, Take 1 capsule by mouth 2 (Two) Times a Day., Disp: 14 capsule, Rfl: 0    traZODone (DESYREL) 50 MG tablet, TAKE 1 TABLET EVERY NIGHT, Disp: 90 tablet, Rfl: 3    triamcinolone (KENALOG) 0.1 % cream, Apply 1 application topically to the appropriate area as directed 2 (Two) Times a Day., Disp: 80 g, Rfl: 3    cefdinir (OMNICEF) 300 MG capsule, Take 1 capsule by mouth 2 (Two) Times a Day for 14 days., Disp: 28 capsule, Rfl: 0    tamsulosin (FLOMAX) 0.4 MG capsule 24 hr capsule, Take 1 capsule by mouth Daily for 360 days., Disp: 90 capsule, Rfl: 3    Allergies:  Allergies   Allergen Reactions    Sulfa Antibiotics Unknown - Low Severity     Patient states he had allergic reaction to sulfa drugs when he was a child.       Social History:  Social History     Socioeconomic History    Marital status:    Tobacco Use    Smoking status: Former     Packs/day: 2.00     Years: 15.00     Additional pack years: 0.00     Total pack years: 30.00     Types: Cigarettes     Quit date: 1991     Years since quittin.8     Passive exposure: Past    Smokeless tobacco: Never   Vaping Use    Vaping Use: Never used   Substance and Sexual Activity    Alcohol use: Never    Drug use: Never    Sexual activity: Not Currently     Partners: Female       Family History:  Family History   Problem Relation Age of Onset    Cancer Father        IPSS Questionnaire (AUA-7):  Over the past month…    1)  Incomplete Emptying  How often have you had a sensation of not emptying your bladder?  1 - Less than 1 time in 5   2)  Frequency  How often have you had to urinate less than every two hours? 0 - Not at all   3)  Intermittency  How often  "have you found you stopped and started again several times when you urinated?  2 - Less than half the time   4) Urgency  How often have you found it difficult to postpone urination?  5 - Almost always   5) Weak Stream  How often have you had a weak urinary stream?  2 - Less than half the time   6) Straining  How often have you had to push or strain to begin urination?  0 - Not at all   7) Nocturia  How many times did you typically get up at night to urinate?  0 - None   Total Score:  10   The International Prostate Symptom Score (IPSS) is used to screen, diagnose, track symptoms of benign prostatic hyperplasia (BPH).    0-7 pts (Mild Symptoms)  / 8-19 pts (Moderate) / 20-35 (Severe)    Quality of life due to urinary symptoms:  If you were to spend the rest of your life with your urinary condition the way it is now, how would you feel about that? 2-Mostly SatisfiedP   Urine Leakage (Incontinence) 0-No Leakage       Post void residual bladder scan:   0 mL     Objective     Physical Exam:   Vital Signs:   Vitals:    10/20/23 1025   BP: 130/80   Pulse: 81   SpO2: 97%   Weight: 96.6 kg (213 lb)   Height: 177.8 cm (70\")   PainSc:   2     Body mass index is 30.56 kg/m².     Physical Exam  Vitals and nursing note reviewed.   Constitutional:       General: He is awake. He is not in acute distress.     Appearance: Normal appearance. He is well-developed.   HENT:      Head: Normocephalic and atraumatic.      Right Ear: External ear normal.      Left Ear: External ear normal.      Nose: Nose normal.   Eyes:      Conjunctiva/sclera: Conjunctivae normal.   Pulmonary:      Effort: Pulmonary effort is normal.   Abdominal:      General: There is no distension.      Palpations: Abdomen is soft. There is no mass.      Tenderness: There is no abdominal tenderness. There is no right CVA tenderness, left CVA tenderness, guarding or rebound.      Hernia: No hernia is present. There is no hernia in the left inguinal area or right inguinal " area.   Genitourinary:     Pubic Area: No rash.       Penis: Normal.       Testes: Normal.      Rectum: No mass or tenderness. Normal anal tone.   Musculoskeletal:      Cervical back: Normal range of motion.   Lymphadenopathy:      Lower Body: No right inguinal adenopathy. No left inguinal adenopathy.   Skin:     General: Skin is warm.   Neurological:      General: No focal deficit present.      Mental Status: He is alert and oriented to person, place, and time.   Psychiatric:         Behavior: Behavior normal. Behavior is cooperative.         Labs:   Lab Results   Component Value Date    PSA 0.761 06/15/2018       Brief Urine Lab Results  (Last result in the past 365 days)        Color   Clarity   Blood   Leuk Est   Nitrite   Protein   CREAT   Urine HCG        10/20/23 1034 Yellow   Slightly Cloudy   Negative   Moderate (2+)   Negative   Trace                   Urine Culture          8/24/2023    12:18 9/11/2023    10:11 9/19/2023    10:17   Urine Culture   Urine Culture >100,000 CFU/mL Citrobacter koseri  Final report  Final report         Lab Results   Component Value Date    GLUCOSE 180 (H) 08/24/2023    CALCIUM 10.5 08/24/2023     08/24/2023    K 4.2 08/24/2023    CO2 25.8 08/24/2023     08/24/2023    BUN 21 08/24/2023    CREATININE 0.96 08/24/2023    EGFRIFAFRI 115 07/09/2021    EGFRIFNONA 95 07/09/2021    BCR 21.9 08/24/2023    ANIONGAP 12.2 08/24/2023       Lab Results   Component Value Date    WBC 9.05 08/24/2023    HGB 16.1 08/24/2023    HCT 45.5 08/24/2023    MCV 84.9 08/24/2023     08/24/2023       Images:   XR Chest 2 View    Result Date: 8/24/2023  No acute cardiopulmonary process.        Images were reviewed, interpreted, and dictated by Dr. Ana Alaniz MD Transcribed by Tasha Shi PA-C.  This report was signed and finalized on 8/24/2023 3:48 PM by Ana Alaniz MD.        Measures:   Tobacco:   Yehuda Capellan  reports that he quit smoking about 32 years ago. His smoking  use included cigarettes. He has a 30.00 pack-year smoking history. He has been exposed to tobacco smoke. He has never used smokeless tobacco.    Urine Incontinence: Patient reports that he is not currently experiencing any symptoms of urinary incontinence.         Assessment / Plan      Assessment:  Mr. Capellan is a 68 y.o. male who presented today with lower urinary tract symptoms and recurrent UTI.  He has had a culture positive pan sensitive Citrobacter for the past few months.  He has not been able to clear his infection despite being started on Macrobid.  I will start him on Omnicef as Macrobid has no tissue penetration.  His prostate exam today did not reveal anything concerning.   We will hold off on PSA at this point.  I started him on tamsulosin as well and we discussed the risks and side effects.    I will obtain a CT urogram to evaluate for any stones or other reason for his UTI.  We will plan on cystoscopy at his next visit.      Diagnoses and all orders for this visit:    1. Recurrent UTI (Primary)  -     POC Urinalysis Dipstick, Automated  -     CT Abdomen Pelvis With & Without Contrast; Future  -     tamsulosin (FLOMAX) 0.4 MG capsule 24 hr capsule; Take 1 capsule by mouth Daily for 360 days.  Dispense: 90 capsule; Refill: 3  -     cefdinir (OMNICEF) 300 MG capsule; Take 1 capsule by mouth 2 (Two) Times a Day for 14 days.  Dispense: 28 capsule; Refill: 0        Patient Education:       Follow Up:   Return in about 2 weeks (around 11/3/2023) for Recheck.      Margarita Molina MD  [facility] [place]

## 2023-11-15 ENCOUNTER — HOSPITAL ENCOUNTER (OUTPATIENT)
Dept: CT IMAGING | Facility: HOSPITAL | Age: 68
Discharge: HOME | End: 2023-11-15
Admitting: UROLOGY
Payer: MEDICARE

## 2023-11-15 DIAGNOSIS — N39.0 RECURRENT UTI: ICD-10-CM

## 2023-11-15 PROCEDURE — 74178 CT ABD&PLV WO CNTR FLWD CNTR: CPT

## 2023-11-15 PROCEDURE — 25510000001 IOPAMIDOL 61 % SOLUTION: Performed by: UROLOGY

## 2023-11-15 RX ADMIN — IOPAMIDOL 100 ML: 612 INJECTION, SOLUTION INTRAVENOUS at 18:28

## 2023-11-17 ENCOUNTER — PROCEDURE VISIT (OUTPATIENT)
Dept: UROLOGY | Facility: CLINIC | Age: 68
End: 2023-11-17
Payer: MEDICARE

## 2023-11-17 VITALS
HEIGHT: 70 IN | OXYGEN SATURATION: 93 % | WEIGHT: 213 LBS | DIASTOLIC BLOOD PRESSURE: 76 MMHG | HEART RATE: 45 BPM | SYSTOLIC BLOOD PRESSURE: 132 MMHG | BODY MASS INDEX: 30.49 KG/M2

## 2023-11-17 DIAGNOSIS — N39.0 RECURRENT UTI: Primary | ICD-10-CM

## 2023-11-17 DIAGNOSIS — I10 ESSENTIAL HYPERTENSION: ICD-10-CM

## 2023-11-17 DIAGNOSIS — I48.20 ATRIAL FIBRILLATION, CHRONIC: ICD-10-CM

## 2023-11-17 LAB
BILIRUB BLD-MCNC: NEGATIVE MG/DL
CLARITY, POC: CLEAR
COLOR UR: YELLOW
EXPIRATION DATE: ABNORMAL
GLUCOSE UR STRIP-MCNC: ABNORMAL MG/DL
KETONES UR QL: NEGATIVE
LEUKOCYTE EST, POC: NEGATIVE
Lab: ABNORMAL
NITRITE UR-MCNC: NEGATIVE MG/ML
PH UR: 6 [PH] (ref 5–8)
PROT UR STRIP-MCNC: NEGATIVE MG/DL
RBC # UR STRIP: NEGATIVE /UL
SP GR UR: 1.03 (ref 1–1.03)
UROBILINOGEN UR QL: NORMAL

## 2023-11-17 RX ORDER — METOPROLOL TARTRATE 50 MG/1
TABLET, FILM COATED ORAL
Qty: 180 TABLET | Refills: 1 | Status: SHIPPED | OUTPATIENT
Start: 2023-11-17

## 2023-11-17 RX ORDER — CEFTRIAXONE 1 G/1
1 INJECTION, POWDER, FOR SOLUTION INTRAMUSCULAR; INTRAVENOUS ONCE
Status: COMPLETED | OUTPATIENT
Start: 2023-11-17 | End: 2023-11-17

## 2023-11-17 RX ADMIN — CEFTRIAXONE 1 G: 1 INJECTION, POWDER, FOR SOLUTION INTRAMUSCULAR; INTRAVENOUS at 13:14

## 2023-11-17 NOTE — PROGRESS NOTES
Follow Up Office Visit      Patient Name: Yehuda Capellan  : 1955   MRN: [id number]     Chief Complaint:    Chief Complaint   Patient presents with    Recurrent UTI       Referring Provider: No ref. provider found    History of Present Illness: Yehuda Capellan is a 68 y.o. male who presents today for follow up of  recurrent UTI.  He reports he has done well since his last visit.  He is here today for cystoscopy.  He is waiting on his hip replacement which was delayed due to his UTI.  He reports his voiding has improved since starting tamsulosin.     Preprocedure diagnosis  Karen    Postprocedure diagnosis  Same    Procedure  Flexible Cystourethroscopy    Attending surgeon  Margarita Molina MD    Anesthesia  2% lidocaine jelly intraurethrally    Complications  None    Indications  68 y.o. male undergoing a flexible cystoscopy for the above mentioned indications.  Informed consent was obtained.      Findings  Cystoscopic findings included one right and left ureteral orifice in the normal orthotopic position with normal bladder mucosa and no tumors, masses or stones.   The urethral urothelium was within normal limits with no visible strictures.  The prostatic urethra revealed complete lateral lobe coaptation, with no median lobe.  Circumferential protrusion of his prostate but would be a good Urolift candidate.  Moderate trabeculations.    Procedure  The patient was placed in supine position and prepped and draped in sterile fashion with lidocaine jelly instill per the urethra for anesthesia 5 minutes prior to procedure start.  A brief timeout was performed including available nursing staff and the patient.  The 16 Fr digital flexible cystoscope was lubricated and gently advanced into the urethral meatus. The penile, bulbar, prostatic, and membranous urethra appeared widely patent without obvious stricture or mucosal abnormality. The scope was then advanced into the bladder. The bladder was completely  visualized starting with the trigone. There were bilateral orthotopic ureteral orifices. The posterior wall, lateral walls, anterior wall, and dome were completely visualized WITHOUT obvious mucosal lesions, tumors, stones.  The cystoscope was retroflexed and the bladder neck and prostate were further visualized and appeared normal.  The cystoscope was then gently withdrawn while visualizing the urethra and the procedure was then terminated.  The patient tolerated the procedure well.        Subjective      Review of System:   Review of Systems   Constitutional:  Negative for chills, fatigue, fever and unexpected weight change.   HENT:  Negative for congestion, rhinorrhea and sore throat.    Eyes:  Negative for visual disturbance.   Respiratory:  Negative for apnea, cough, chest tightness and shortness of breath.    Cardiovascular:  Negative for chest pain.   Gastrointestinal:  Negative for abdominal pain, constipation, diarrhea, nausea and vomiting.   Endocrine: Negative for polydipsia and polyuria.   Genitourinary:  Negative for difficulty urinating, dysuria, enuresis, flank pain, frequency, genital sores, hematuria and urgency.   Musculoskeletal:  Negative for gait problem.   Skin:  Negative for rash and wound.   Allergic/Immunologic: Negative for immunocompromised state.   Neurological:  Negative for dizziness, tremors, syncope, weakness and light-headedness.   Hematological:  Does not bruise/bleed easily.      I have reviewed the ROS documented by my clinical staff, I have updated appropriately and I agree. Margarita Molina MD    I have reviewed and the following portions of the patient's history were updated as appropriate: past family history, past medical history, past social history, past surgical history and problem list.    Past Medical History:   Past Medical History:   Diagnosis Date    Allergic     Arthritis     Atrial fibrillation     Diabetes mellitus     Elevated cholesterol     Elevated glucose  09/29/2017    A1C 6.7    Hip fracture     right, at age 17    Hyperlipidemia     Hypertension     Microalbuminuria due to type 2 diabetes mellitus 9/9/2023    Obesity        Past Surgical History:  Past Surgical History:   Procedure Laterality Date    COLONOSCOPY      HAND SURGERY Left     LEFT PINKY SEWN BACK ON in [place] , cut off with table saw       Family History:   family history includes Cancer in his father.   Otherwise pertinent FHx was reviewed and not pertinent to current issue.    Social History:    reports that he quit smoking about 32 years ago. His smoking use included cigarettes. He has a 30.00 pack-year smoking history. He has been exposed to tobacco smoke. He has never used smokeless tobacco. He reports that he does not drink alcohol and does not use drugs.    Medications:     Current Outpatient Medications:     Accu-Chek Softclix Lancets lancets, TEST TWO TIMES A DAY FOR   DIABETES MELLITUS, Disp: 200 each, Rfl: 3    Alcohol Swabs (B-D SINGLE USE SWABS REGULAR) pads, USE 3 TIMES A DAY, Disp: 300 each, Rfl: 3    apixaban (Eliquis) 5 MG tablet tablet, Take 1 tablet by mouth Every 12 (Twelve) Hours., Disp: 180 tablet, Rfl: 2    atorvastatin (LIPITOR) 20 MG tablet, Take 1 tablet by mouth Every Night. To lower cholesterol and risk of stroke., Disp: 90 tablet, Rfl: 3    Blood Glucose Calibration (Accu-Chek Darcy) solution, Use as directed. E11.9, Disp: 1 each, Rfl: 3    cetirizine (zyrTEC) 10 MG tablet, Take 1 tablet by mouth Daily., Disp: , Rfl:     Cyanocobalamin (Vitamin B-12) 1000 MCG sublingual tablet, Place  under the tongue Daily., Disp: , Rfl:     fluorouracil (EFUDEX) 5 % cream, , Disp: , Rfl:     gabapentin (NEURONTIN) 300 MG capsule, TAKE 1 CAPSULE 4 TIMES     DAILY AS NEEDED FOR NERVE  PAIN (Patient taking differently: Take 1 capsule by mouth 4 (Four) Times a Day As Needed.), Disp: 360 capsule, Rfl: 1    glimepiride (AMARYL) 2 MG tablet, Take 1.5 tablets by mouth Daily., Disp: , Rfl:      "glucose blood (Accu-Chek Darcy Plus) test strip, CHECK GLUCOSE TWO TIMES A  DAY FOR DIABETES MELLITUS, Disp: 200 each, Rfl: 3    glucose monitor monitoring kit, ACCU-CHECK DARCY PLUS METER as requested., Disp: 1 each, Rfl: 1    Lancets Misc. misc, TEST TWICE A DAY FOR DM. E11.9. patient requests acc-check softclick, Disp: 200 each, Rfl: 3    lidocaine (LIDODERM) 5 %, Place 1 patch on the skin as directed by provider Daily. Remove & Discard patch within 12 hours or as directed by MD, Disp: 30 patch, Rfl: 0    losartan-hydrochlorothiazide (HYZAAR) 100-12.5 MG per tablet, Take 1 tablet by mouth Daily. Indications: High Blood Pressure Disorder, Disp: 90 tablet, Rfl: 3    meloxicam (Mobic) 7.5 MG tablet, Take 1 tablet by mouth Daily., Disp: 14 tablet, Rfl: 0    metoprolol tartrate (LOPRESSOR) 50 MG tablet, TAKE 1 TABLET TWICE A DAY, Disp: 180 tablet, Rfl: 1    multivitamin with minerals tablet tablet, Take 1 tablet by mouth Daily., Disp: , Rfl:     nitrofurantoin, macrocrystal-monohydrate, (Macrobid) 100 MG capsule, Take 1 capsule by mouth 2 (Two) Times a Day., Disp: 14 capsule, Rfl: 0    tamsulosin (FLOMAX) 0.4 MG capsule 24 hr capsule, Take 1 capsule by mouth Daily for 360 days., Disp: 90 capsule, Rfl: 3    traZODone (DESYREL) 50 MG tablet, TAKE 1 TABLET EVERY NIGHT, Disp: 90 tablet, Rfl: 3    triamcinolone (KENALOG) 0.1 % cream, Apply 1 application topically to the appropriate area as directed 2 (Two) Times a Day., Disp: 80 g, Rfl: 3    Allergies:   Allergies   Allergen Reactions    Sulfa Antibiotics Unknown - Low Severity     Patient states he had allergic reaction to sulfa drugs when he was a child.         Objective     Physical Exam:   Vital Signs:   Vitals:    11/17/23 1150   BP: 132/76   Pulse: (!) 45   SpO2: 93%   Weight: 96.6 kg (213 lb)   Height: 177.8 cm (70\")   PainSc: 0-No pain     Body mass index is 30.56 kg/m².     Physical Exam  Vitals and nursing note reviewed.   Constitutional:       General: He is " awake. He is not in acute distress.     Appearance: Normal appearance. He is well-developed.   HENT:      Head: Normocephalic and atraumatic.      Right Ear: External ear normal.      Left Ear: External ear normal.      Nose: Nose normal.   Eyes:      Conjunctiva/sclera: Conjunctivae normal.   Pulmonary:      Effort: Pulmonary effort is normal.   Abdominal:      General: There is no distension.      Palpations: Abdomen is soft. There is no mass.      Tenderness: There is no abdominal tenderness. There is no right CVA tenderness, left CVA tenderness, guarding or rebound.      Hernia: No hernia is present. There is no hernia in the left inguinal area or right inguinal area.   Genitourinary:     Pubic Area: No rash.       Rectum: No mass or tenderness. Normal anal tone.   Musculoskeletal:      Cervical back: Normal range of motion.   Lymphadenopathy:      Lower Body: No right inguinal adenopathy. No left inguinal adenopathy.   Skin:     General: Skin is warm.   Neurological:      General: No focal deficit present.      Mental Status: He is alert and oriented to person, place, and time.   Psychiatric:         Behavior: Behavior normal. Behavior is cooperative.         Labs:   Brief Urine Lab Results  (Last result in the past 365 days)        Color   Clarity   Blood   Leuk Est   Nitrite   Protein   CREAT   Urine HCG        10/20/23 1034 Yellow   Slightly Cloudy   Negative   Moderate (2+)   Negative   Trace                   Urine Culture          8/24/2023    12:18 9/11/2023    10:11 9/19/2023    10:17   Urine Culture   Urine Culture >100,000 CFU/mL Citrobacter koseri  Final report  Final report         Lab Results   Component Value Date    GLUCOSE 180 (H) 08/24/2023    CALCIUM 10.5 08/24/2023     08/24/2023    K 4.2 08/24/2023    CO2 25.8 08/24/2023     08/24/2023    BUN 21 08/24/2023    CREATININE 0.96 08/24/2023    EGFRIFAFRI 115 07/09/2021    EGFRIFNONA 95 07/09/2021    BCR 21.9 08/24/2023    ANIONGAP  12.2 08/24/2023       Lab Results   Component Value Date    WBC 9.05 08/24/2023    HGB 16.1 08/24/2023    HCT 45.5 08/24/2023    MCV 84.9 08/24/2023     08/24/2023       Lab Results   Component Value Date    PSA 0.761 06/15/2018       Images:   CT Abdomen Pelvis With & Without Contrast    Result Date: 11/16/2023  1. Numerous renal cysts without evidence of solid renal mass or obstruction. 2. Incidental note is made of cholelithiasis.    CTDI: 8.61 mGy DLP: 1760.36 mGy.cm   This study was performed with techniques to keep radiation doses as low as reasonably achievable (ALARA). Individualized dose reduction techniques using automated exposure control or adjustment of mA and/or kV according to the patient size were employed.     Images were reviewed, interpreted, and dictated by Dr. Bharat Cobb MD Transcribed by Tasha Shi PA-C.  This report was signed and finalized on 11/16/2023 2:25 PM by Bharat Cobb MD.      XR Chest 2 View    Result Date: 8/24/2023  No acute cardiopulmonary process.        Images were reviewed, interpreted, and dictated by Dr. Ana Alaniz MD Transcribed by Tasha Shi PA-C.  This report was signed and finalized on 8/24/2023 3:48 PM by Ana Alaniz MD.        Measures:   Tobacco:   Yehuda Capellan  reports that he quit smoking about 32 years ago. His smoking use included cigarettes. He has a 30.00 pack-year smoking history. He has been exposed to tobacco smoke. He has never used smokeless tobacco..          Urine Incontinence: Patient reports that he is not currently experiencing any symptoms of urinary incontinence.         Assessment / Plan      Assessment/Plan:   68 y.o. male who presented today for follow up of his lower urinary tract symptoms and recurrent UTI.  It appears that he has now cleared his infection.  I will send the guidance to confirm.  He is cleared for his hip replacement if necessary from a urologic standpoint.  I would continue his tamsulosin for  now.  I will see him back in 3 months.  We will plan on PSA at that time.  If he is interested in an outlet procedure he would be a good candidate for UroLift.  He will require TRUS for volume and uroflow prior to his procedure.    Diagnoses and all orders for this visit:    1. Recurrent UTI (Primary)         Follow Up:   Return in about 3 months (around 2/17/2024) for Recheck.    I spent approximately 30 minutes providing clinical care for this patient; including review of patient's chart and provider documentation, face to face time spent with patient in examination room (obtaining history, performing physical exam, discussing diagnosis and management options), placing orders, and completing patient documentation.     Margarita Molina MD  [facility] [place]

## 2023-11-19 DIAGNOSIS — E11.65 TYPE 2 DIABETES MELLITUS WITH HYPERGLYCEMIA, WITHOUT LONG-TERM CURRENT USE OF INSULIN: ICD-10-CM

## 2023-11-20 DIAGNOSIS — N30.00 ACUTE CYSTITIS WITHOUT HEMATURIA: Primary | ICD-10-CM

## 2023-11-20 RX ORDER — NITROFURANTOIN 25; 75 MG/1; MG/1
100 CAPSULE ORAL 2 TIMES DAILY
Qty: 10 CAPSULE | Refills: 0 | Status: SHIPPED | OUTPATIENT
Start: 2023-11-20

## 2023-11-20 RX ORDER — METFORMIN HYDROCHLORIDE 500 MG/1
1000 TABLET, EXTENDED RELEASE ORAL
Qty: 180 TABLET | Refills: 3 | OUTPATIENT
Start: 2023-11-20

## 2023-11-25 DIAGNOSIS — L40.9 PSORIASIS: ICD-10-CM

## 2023-11-27 RX ORDER — TRIAMCINOLONE ACETONIDE 1 MG/G
CREAM TOPICAL
Qty: 80 G | Refills: 3 | Status: SHIPPED | OUTPATIENT
Start: 2023-11-27

## 2023-12-05 ENCOUNTER — OFFICE VISIT (OUTPATIENT)
Dept: INTERNAL MEDICINE | Facility: CLINIC | Age: 68
End: 2023-12-05
Payer: MEDICARE

## 2023-12-05 ENCOUNTER — TELEPHONE (OUTPATIENT)
Dept: ORTHOPEDIC SURGERY | Facility: CLINIC | Age: 68
End: 2023-12-05
Payer: MEDICARE

## 2023-12-05 VITALS
DIASTOLIC BLOOD PRESSURE: 62 MMHG | RESPIRATION RATE: 16 BRPM | HEIGHT: 70 IN | OXYGEN SATURATION: 95 % | TEMPERATURE: 97 F | WEIGHT: 212.2 LBS | SYSTOLIC BLOOD PRESSURE: 122 MMHG | HEART RATE: 81 BPM | BODY MASS INDEX: 30.38 KG/M2

## 2023-12-05 DIAGNOSIS — I10 ESSENTIAL HYPERTENSION: ICD-10-CM

## 2023-12-05 DIAGNOSIS — Z23 NEED FOR INFLUENZA VACCINATION: ICD-10-CM

## 2023-12-05 DIAGNOSIS — E11.65 TYPE 2 DIABETES MELLITUS WITH HYPERGLYCEMIA, WITHOUT LONG-TERM CURRENT USE OF INSULIN: Primary | ICD-10-CM

## 2023-12-05 DIAGNOSIS — M16.11 PRIMARY OSTEOARTHRITIS OF RIGHT HIP: ICD-10-CM

## 2023-12-05 DIAGNOSIS — G63 NEUROPATHY DUE TO MEDICAL CONDITION: ICD-10-CM

## 2023-12-05 LAB
EXPIRATION DATE: ABNORMAL
HBA1C MFR BLD: 8.1 % (ref 4.5–5.7)
Lab: ABNORMAL

## 2023-12-05 RX ORDER — GABAPENTIN 300 MG/1
300 CAPSULE ORAL 4 TIMES DAILY PRN
Qty: 360 CAPSULE | Refills: 1 | Status: SHIPPED | OUTPATIENT
Start: 2023-12-05

## 2023-12-05 NOTE — PROGRESS NOTES
"Chief Complaint  Diabetes (3 month follow up)    Subjective        Senjanae Capellan presents to [facility]  History of Present Illness  Continues to struggle with gait, getting up due to hips. Has been cleared by urology to proceed with hip surgery, currently scheduled 2/13/24 but they're going to try to get it sooner. He put himself back on metformin due to hyperglycemia but continues to struggle with diarrhea from med.       Objective   Vital Signs:  /62 (BP Location: Right arm, Patient Position: Sitting, Cuff Size: Adult)   Pulse 81   Temp 97 °F (36.1 °C) (Temporal)   Resp 16   Ht 177.8 cm (70\")   Wt 96.3 kg (212 lb 3.2 oz)   SpO2 95%   BMI 30.45 kg/m²   Estimated body mass index is 30.45 kg/m² as calculated from the following:    Height as of this encounter: 177.8 cm (70\").    Weight as of this encounter: 96.3 kg (212 lb 3.2 oz).               Physical Exam  Vitals and nursing note reviewed.   Constitutional:       General: He is not in acute distress.     Appearance: Normal appearance. He is well-developed and well-groomed. He is obese. He is not ill-appearing, toxic-appearing or diaphoretic.   HENT:      Head: Normocephalic and atraumatic.      Right Ear: Hearing normal.      Left Ear: Hearing normal.   Eyes:      General: Lids are normal. No scleral icterus.        Right eye: No discharge.         Left eye: No discharge.      Extraocular Movements: Extraocular movements intact.   Cardiovascular:      Rate and Rhythm: Normal rate and regular rhythm.   Pulmonary:      Effort: Pulmonary effort is normal.   Musculoskeletal:      Cervical back: Neck supple.   Skin:     Coloration: Skin is not jaundiced or pale.   Neurological:      General: No focal deficit present.      Mental Status: He is alert and oriented to person, place, and time.      Gait: Gait abnormal.   Psychiatric:         Attention and Perception: Attention and perception normal.         Mood and Affect: " Mood and affect normal.         Speech: Speech normal.         Behavior: Behavior normal. Behavior is cooperative.         Thought Content: Thought content normal.         Cognition and Memory: Cognition and memory normal.         Judgment: Judgment normal.        Result Review :  The following data was reviewed by: Cindy Zhang MD on 12/05/2023:  Lab Results   Component Value Date    HGBA1C 8.1 (A) 12/05/2023    HGBA1C 7.30 (H) 08/24/2023    HGBA1C 7.6 05/30/2023      Progress Notes by Margarita Molina MD (11/17/2023 12:00)                  Assessment and Plan   Diagnoses and all orders for this visit:    1. Type 2 diabetes mellitus with hyperglycemia, without long-term current use of insulin (Primary)  -     POC Glycosylated Hemoglobin (Hb A1C)  -     Discontinue: empagliflozin (Jardiance) 10 MG tablet tablet; Take 1 tablet by mouth Daily. For diabetes mellitus.  Dispense: 90 tablet; Refill: 3  -     empagliflozin (Jardiance) 10 MG tablet tablet; Take 1 tablet by mouth Daily. For diabetes mellitus.  Dispense: 90 tablet; Refill: 3  -     empagliflozin (Jardiance) 10 MG tablet tablet; Take 1 tablet by mouth Daily.  Dispense: 7 tablet; Refill: 0    2. Neuropathy due to medical condition  Comments:  diabetes  Orders:  -     gabapentin (NEURONTIN) 300 MG capsule; Take 1 capsule by mouth 4 (Four) Times a Day As Needed (nerve pain).  Dispense: 360 capsule; Refill: 1    3. Essential hypertension  Assessment & Plan:  Hypertension is improving with treatment.  Continue current treatment regimen.  Blood pressure will be reassessed at the next regular appointment.      4. Primary osteoarthritis of right hip  Comments:  contact orthopedics, urology cleared for surgery    5. Need for influenza vaccination  -     Fluzone High-Dose 65+yrs ([id number])      Reviewed urology note, no history of bladder cancer. Recommend stopping metformin (will add to allergy list as intolerance) and adding Jardiance. Sample provided and rx  sent. Watch for signs of yeast infection notify provider if occurs.        Follow Up   Return in about 3 months (around 3/18/2024) for Diabetes follow up.  Patient was given instructions and counseling regarding his condition or for health maintenance advice. Please see specific information pulled into the AVS if appropriate.

## 2023-12-06 NOTE — TELEPHONE ENCOUNTER
"    Caller: Yehuda Capellan \"Ray\"    Relationship to patient: Self    Best call back number: [phone]    Chief complaint: RIGHT HIP     Type of visit: SURGERY    Requested date: ASAP     Additional notes: PATIENT WOULD LIKE TO GET HIS HIP SURGERY R/S ASAP, HE STATES HE IS HAVING TROUBLE WALKING        "

## 2023-12-07 DIAGNOSIS — E11.65 TYPE 2 DIABETES MELLITUS WITH HYPERGLYCEMIA, WITHOUT LONG-TERM CURRENT USE OF INSULIN: ICD-10-CM

## 2023-12-07 RX ORDER — METFORMIN HYDROCHLORIDE 500 MG/1
1000 TABLET, EXTENDED RELEASE ORAL
Qty: 180 TABLET | Refills: 3 | OUTPATIENT
Start: 2023-12-07

## 2023-12-15 RX ORDER — GLIMEPIRIDE 2 MG/1
2 TABLET ORAL
Qty: 90 TABLET | Refills: 3 | Status: SHIPPED | OUTPATIENT
Start: 2023-12-15

## 2023-12-15 RX ORDER — ISOPROPYL ALCOHOL 0.75 G/1
SWAB TOPICAL
Qty: 300 EACH | Refills: 3 | OUTPATIENT
Start: 2023-12-15

## 2023-12-15 RX ORDER — LANCETS
EACH MISCELLANEOUS
Qty: 200 EACH | Refills: 3 | OUTPATIENT
Start: 2023-12-15

## 2023-12-21 ENCOUNTER — PREP FOR SURGERY (OUTPATIENT)
Dept: OTHER | Facility: HOSPITAL | Age: 68
End: 2023-12-21
Payer: MEDICARE

## 2023-12-21 DIAGNOSIS — M16.11 PRIMARY OSTEOARTHRITIS OF RIGHT HIP: Primary | ICD-10-CM

## 2023-12-21 RX ORDER — TRANEXAMIC ACID 10 MG/ML
1000 INJECTION, SOLUTION INTRAVENOUS
OUTPATIENT
Start: 2023-12-22 | End: 2023-12-23

## 2023-12-21 RX ORDER — CEFAZOLIN SODIUM 2 G/50ML
2 SOLUTION INTRAVENOUS
OUTPATIENT
Start: 2023-12-22 | End: 2023-12-23

## 2023-12-21 RX ORDER — FAMOTIDINE 10 MG/ML
20 INJECTION, SOLUTION INTRAVENOUS
OUTPATIENT
Start: 2023-12-22 | End: 2023-12-23

## 2023-12-22 ENCOUNTER — TELEPHONE (OUTPATIENT)
Dept: INTERNAL MEDICINE | Facility: CLINIC | Age: 68
End: 2023-12-22
Payer: MEDICARE

## 2023-12-22 DIAGNOSIS — Z12.11 COLON CANCER SCREENING: ICD-10-CM

## 2023-12-22 DIAGNOSIS — R19.5 POSITIVE FIT (FECAL IMMUNOCHEMICAL TEST): Primary | ICD-10-CM

## 2023-12-22 LAB — HEMOCCULT STL QL IA: POSITIVE

## 2023-12-22 NOTE — TELEPHONE ENCOUNTER
Santy would like to see  or Connor for colonoscopy but he wants to wait until after his hip replacement in February.

## 2023-12-22 NOTE — TELEPHONE ENCOUNTER
Received an outside fecal test for colon cancer. Positive/abnormal. Colonoscopy needed. Same result for wife. Do they have a preference on who they see for colonoscopy? Would like to refer.

## 2023-12-24 DIAGNOSIS — E11.65 TYPE 2 DIABETES MELLITUS WITH HYPERGLYCEMIA, WITHOUT LONG-TERM CURRENT USE OF INSULIN: ICD-10-CM

## 2023-12-26 RX ORDER — METFORMIN HYDROCHLORIDE 500 MG/1
1000 TABLET, EXTENDED RELEASE ORAL
Qty: 180 TABLET | Refills: 3 | OUTPATIENT
Start: 2023-12-26

## 2023-12-28 ENCOUNTER — TELEPHONE (OUTPATIENT)
Dept: SURGERY | Facility: CLINIC | Age: 68
End: 2023-12-28
Payer: MEDICARE

## 2023-12-28 NOTE — TELEPHONE ENCOUNTER
PRESCREENING FOR OPEN ACCESS SCHEDULING    Yehuda Capellan, 1955  [id number]    12/28/23    If, the patient answers yes to any of the following questions the provider will be informed prior to scheduling open access for approval and documented in the chart.    [x]  Yes  [] No    1. Have you ever had a colonoscopy in the past?      When:  01/07/20213      Where: [facility]      Polyps or other:     []  Yes  [x] No    2. Family history of colon cancer?      Relation:       Age of onset:       Do you currently have any of the following?    []  Yes  [x] No  Rectal bleeding, if so, how long?     []  Yes  [x] No  Abdominal pain, if so, how long?    []  Yes  [x] No  Constipation, if so, how long?    []  Yes  [x] No  Diarrhea, if so, how long?    []  Yes  [x] No  Weight loss, is so, how much?    [] Yes  [x] No  Small caliber stool, if so, how long?      Have you ever had any of the following conditions?    [] Yes  [x] No  Heart attack?      When?       Last cardiac workup?     Blood thinners? Eliquis    [] Yes  [x] No   Lung problems, asthma or COPD?  [] Yes  [x] No  Oxygen required?       [] Yes  [x] No  Stroke?     [] Yes  [x] No  Have you ever had a reaction to anesthesia?

## 2023-12-28 NOTE — TELEPHONE ENCOUNTER
Pt would like to be scheduled on 04/16/2024 @ [facility] w/ Dr. Menendez-verified list [facility]. Thank you.

## 2024-01-02 RX ORDER — ISOPROPYL ALCOHOL 0.75 G/1
SWAB TOPICAL
Qty: 300 EACH | Refills: 3 | OUTPATIENT
Start: 2024-01-02

## 2024-01-02 RX ORDER — LANCETS
EACH MISCELLANEOUS
Qty: 200 EACH | Refills: 3 | OUTPATIENT
Start: 2024-01-02

## 2024-01-04 ENCOUNTER — PREP FOR SURGERY (OUTPATIENT)
Dept: OTHER | Facility: HOSPITAL | Age: 69
End: 2024-01-04
Payer: MEDICARE

## 2024-01-04 DIAGNOSIS — Z12.11 SCREENING FOR COLON CANCER: Primary | ICD-10-CM

## 2024-01-04 RX ORDER — POLYETHYLENE GLYCOL 3350 17 G/17G
POWDER, FOR SOLUTION ORAL
Qty: 238 EACH | Refills: 0 | Status: SHIPPED | OUTPATIENT
Start: 2024-01-04

## 2024-01-04 RX ORDER — BISACODYL 5 MG/1
TABLET, DELAYED RELEASE ORAL
Qty: 4 TABLET | Refills: 0 | Status: SHIPPED | OUTPATIENT
Start: 2024-01-04

## 2024-01-04 NOTE — TELEPHONE ENCOUNTER
Patient scheduled an open access colonoscopy on 04/16/2024 @ [facility] w/ Dr. Menendez

## 2024-01-05 PROBLEM — Z12.11 SCREENING FOR COLON CANCER: Status: ACTIVE | Noted: 2024-01-04

## 2024-01-09 DIAGNOSIS — E11.65 TYPE 2 DIABETES MELLITUS WITH HYPERGLYCEMIA, WITHOUT LONG-TERM CURRENT USE OF INSULIN: ICD-10-CM

## 2024-01-09 NOTE — TELEPHONE ENCOUNTER
Caller: Sydnee Capellan    Relationship: Emergency Contact    Best call back number: [phone]    Requested Prescriptions:   Requested Prescriptions     Pending Prescriptions Disp Refills    empagliflozin (Jardiance) 10 MG tablet tablet 90 tablet 3     Sig: Take 1 tablet by mouth Daily. For diabetes mellitus.        Pharmacy where request should be sent: [facility] [address] [place] [address] [phone] [facility] [phone] FX     Last office visit with prescribing clinician: 12/5/2023   Last telemedicine visit with prescribing clinician: Visit date not found   Next office visit with prescribing clinician: 3/5/2024     Additional details provided by patient: PT IS OUT    Does the patient have less than a 3 day supply:  [x] Yes  [] No    Would you like a call back once the refill request has been completed: [x] Yes [] No    If the office needs to give you a call back, can they leave a voicemail: [] Yes [] No    Flaco Bernal Rep   01/09/24 10:03 EST

## 2024-01-29 ENCOUNTER — TELEPHONE (OUTPATIENT)
Dept: PREADMISSION TESTING | Facility: HOSPITAL | Age: 69
End: 2024-01-29

## 2024-01-30 ENCOUNTER — HOSPITAL ENCOUNTER (OUTPATIENT)
Dept: GENERAL RADIOLOGY | Facility: HOSPITAL | Age: 69
Discharge: HOME | End: 2024-01-30
Payer: MEDICARE

## 2024-01-30 ENCOUNTER — PRE-ADMISSION TESTING (OUTPATIENT)
Dept: PREADMISSION TESTING | Facility: HOSPITAL | Age: 69
End: 2024-01-30
Payer: MEDICARE

## 2024-01-30 ENCOUNTER — OFFICE VISIT (OUTPATIENT)
Dept: ORTHOPEDIC SURGERY | Facility: CLINIC | Age: 69
End: 2024-01-30
Payer: MEDICARE

## 2024-01-30 VITALS
BODY MASS INDEX: 30.06 KG/M2 | HEIGHT: 70 IN | DIASTOLIC BLOOD PRESSURE: 84 MMHG | WEIGHT: 210 LBS | SYSTOLIC BLOOD PRESSURE: 138 MMHG

## 2024-01-30 DIAGNOSIS — M16.11 PRIMARY OSTEOARTHRITIS OF RIGHT HIP: ICD-10-CM

## 2024-01-30 DIAGNOSIS — M25.551 ARTHRALGIA OF RIGHT HIP: Primary | ICD-10-CM

## 2024-01-30 LAB
ALBUMIN SERPL-MCNC: 4.7 G/DL (ref 3.5–5.2)
ALBUMIN/GLOB SERPL: 1.8 G/DL
ALP SERPL-CCNC: 83 U/L (ref 39–117)
ALT SERPL W P-5'-P-CCNC: 47 U/L (ref 1–41)
ANION GAP SERPL CALCULATED.3IONS-SCNC: 11.4 MMOL/L (ref 5–15)
APTT PPP: 33.1 SECONDS (ref 23.5–35.5)
AST SERPL-CCNC: 31 U/L (ref 1–40)
BASOPHILS # BLD AUTO: 0.09 10*3/MM3 (ref 0–0.2)
BASOPHILS NFR BLD AUTO: 1 % (ref 0–1.5)
BILIRUB SERPL-MCNC: 0.5 MG/DL (ref 0–1.2)
BILIRUB UR QL STRIP: NEGATIVE
BUN SERPL-MCNC: 23 MG/DL (ref 8–23)
BUN/CREAT SERPL: 25 (ref 7–25)
CALCIUM SPEC-SCNC: 9.6 MG/DL (ref 8.6–10.5)
CHLORIDE SERPL-SCNC: 102 MMOL/L (ref 98–107)
CLARITY UR: CLEAR
CO2 SERPL-SCNC: 26.6 MMOL/L (ref 22–29)
COLOR UR: YELLOW
CREAT SERPL-MCNC: 0.92 MG/DL (ref 0.76–1.27)
DEPRECATED RDW RBC AUTO: 39.6 FL (ref 37–54)
EGFRCR SERPLBLD CKD-EPI 2021: 89.5 ML/MIN/1.73
EOSINOPHIL # BLD AUTO: 0.24 10*3/MM3 (ref 0–0.4)
EOSINOPHIL NFR BLD AUTO: 2.7 % (ref 0.3–6.2)
ERYTHROCYTE [DISTWIDTH] IN BLOOD BY AUTOMATED COUNT: 12.8 % (ref 12.3–15.4)
GLOBULIN UR ELPH-MCNC: 2.6 GM/DL
GLUCOSE SERPL-MCNC: 255 MG/DL (ref 65–99)
GLUCOSE UR STRIP-MCNC: ABNORMAL MG/DL
HBA1C MFR BLD: 7 % (ref 4.8–5.6)
HCT VFR BLD AUTO: 48.6 % (ref 37.5–51)
HGB BLD-MCNC: 17.2 G/DL (ref 13–17.7)
HGB UR QL STRIP.AUTO: NEGATIVE
IMM GRANULOCYTES # BLD AUTO: 0.02 10*3/MM3 (ref 0–0.05)
IMM GRANULOCYTES NFR BLD AUTO: 0.2 % (ref 0–0.5)
INR PPP: 1.05 (ref 0.9–1.1)
KETONES UR QL STRIP: NEGATIVE
LEUKOCYTE ESTERASE UR QL STRIP.AUTO: NEGATIVE
LYMPHOCYTES # BLD AUTO: 2.27 10*3/MM3 (ref 0.7–3.1)
LYMPHOCYTES NFR BLD AUTO: 25.3 % (ref 19.6–45.3)
MCH RBC QN AUTO: 30.2 PG (ref 26.6–33)
MCHC RBC AUTO-ENTMCNC: 35.4 G/DL (ref 31.5–35.7)
MCV RBC AUTO: 85.4 FL (ref 79–97)
MONOCYTES # BLD AUTO: 0.64 10*3/MM3 (ref 0.1–0.9)
MONOCYTES NFR BLD AUTO: 7.1 % (ref 5–12)
NEUTROPHILS NFR BLD AUTO: 5.7 10*3/MM3 (ref 1.7–7)
NEUTROPHILS NFR BLD AUTO: 63.7 % (ref 42.7–76)
NITRITE UR QL STRIP: NEGATIVE
NRBC BLD AUTO-RTO: 0 /100 WBC (ref 0–0.2)
PH UR STRIP.AUTO: 5.5 [PH] (ref 5–8)
PLATELET # BLD AUTO: 280 10*3/MM3 (ref 140–450)
PMV BLD AUTO: 10.5 FL (ref 6–12)
POTASSIUM SERPL-SCNC: 3.9 MMOL/L (ref 3.5–5.2)
PROT SERPL-MCNC: 7.3 G/DL (ref 6–8.5)
PROT UR QL STRIP: NEGATIVE
PROTHROMBIN TIME: 14.2 SECONDS (ref 12.3–15.1)
RBC # BLD AUTO: 5.69 10*6/MM3 (ref 4.14–5.8)
SODIUM SERPL-SCNC: 140 MMOL/L (ref 136–145)
SP GR UR STRIP: >=1.03 (ref 1–1.03)
UROBILINOGEN UR QL STRIP: ABNORMAL
WBC NRBC COR # BLD AUTO: 8.96 10*3/MM3 (ref 3.4–10.8)

## 2024-01-30 PROCEDURE — 87081 CULTURE SCREEN ONLY: CPT

## 2024-01-30 PROCEDURE — 3079F DIAST BP 80-89 MM HG: CPT | Performed by: PHYSICIAN ASSISTANT

## 2024-01-30 PROCEDURE — 93005 ELECTROCARDIOGRAM TRACING: CPT

## 2024-01-30 PROCEDURE — 3075F SYST BP GE 130 - 139MM HG: CPT | Performed by: PHYSICIAN ASSISTANT

## 2024-01-30 PROCEDURE — 83036 HEMOGLOBIN GLYCOSYLATED A1C: CPT

## 2024-01-30 PROCEDURE — 80053 COMPREHEN METABOLIC PANEL: CPT

## 2024-01-30 PROCEDURE — 36415 COLL VENOUS BLD VENIPUNCTURE: CPT

## 2024-01-30 PROCEDURE — 85730 THROMBOPLASTIN TIME PARTIAL: CPT

## 2024-01-30 PROCEDURE — 81003 URINALYSIS AUTO W/O SCOPE: CPT

## 2024-01-30 PROCEDURE — S0260 H&P FOR SURGERY: HCPCS | Performed by: PHYSICIAN ASSISTANT

## 2024-01-30 PROCEDURE — 85610 PROTHROMBIN TIME: CPT

## 2024-01-30 PROCEDURE — 85025 COMPLETE CBC W/AUTO DIFF WBC: CPT

## 2024-01-30 PROCEDURE — 71046 X-RAY EXAM CHEST 2 VIEWS: CPT

## 2024-01-30 NOTE — PAT
"Placed call to Miguelangel Pineda CRNA regarding 67 y/o pt in PAT for upcoming hip replacement with Dr. Dickens on 2/13/24. ECG done in PAT; pt with medical hx including atrial-fibrillation. Unconfirmed ECG reads \"atrial fibrillation, right bundle branch block.\"  Patient denies chest pain, shortness of breath, dizziness.  Pt followed by Dr. Grajeda; last office visit was 6/8/23 with no additional testing ordered, pt to f/uin 1 year. Pt was originally scheduled for hip surgery 9/5/23 but it was cancelled due to recurrent UTI.   Per Miguelangel, pt may proceed with surgery; no additional orders received.   "

## 2024-01-30 NOTE — PROGRESS NOTES
Yehuda Capellan is a 68 y.o. right hand dominant male   Pre-op Exam of the Right Hip (Total hip arthroplasty scheduled for 2/13/24)          CHIEF COMPLAINT:    Pre-operative evaluation with history and physical exam    History of Present Illness      This is a chronic condition.    Patient presents for a planned right GINNY. He has been experiencing right hip pain due to OA for many years. He has tried FL guided hip injection which only provides temporary relief. He takes mobic for pain and uses a walker. The hip discomfort impedes on his ADL's      PAST MEDICAL HISTORY:    Past Medical History:   Diagnosis Date    Allergic     Arthritis     Atrial fibrillation     Diabetes mellitus     Elevated cholesterol     Elevated glucose 09/29/2017    A1C 6.7    Hip fracture     right, at age 17    Hyperlipidemia     Hypertension     Microalbuminuria due to type 2 diabetes mellitus 9/9/2023    Obesity          Current Outpatient Medications:     apixaban (Eliquis) 5 MG tablet tablet, Take 1 tablet by mouth Every 12 (Twelve) Hours., Disp: 180 tablet, Rfl: 2    atorvastatin (LIPITOR) 20 MG tablet, Take 1 tablet by mouth Every Night. To lower cholesterol and risk of stroke., Disp: 90 tablet, Rfl: 3    bisacodyl (Dulcolax) 5 MG EC tablet, Take 2 tablets at 3 pm and 2 tablets at 7 pm the day prior to colonoscopy, Disp: 4 tablet, Rfl: 0    Blood Glucose Calibration (Accu-Chek Darcy) solution, Use as directed. E11.9, Disp: 1 each, Rfl: 3    cetirizine (zyrTEC) 10 MG tablet, Take 1 tablet by mouth Daily., Disp: , Rfl:     Cyanocobalamin (Vitamin B-12) 1000 MCG sublingual tablet, Place  under the tongue Daily., Disp: , Rfl:     empagliflozin (Jardiance) 10 MG tablet tablet, Take 1 tablet by mouth Daily., Disp: 7 tablet, Rfl: 0    empagliflozin (Jardiance) 10 MG tablet tablet, Take 1 tablet by mouth Daily. For diabetes mellitus., Disp: 90 tablet, Rfl: 3    fluorouracil (EFUDEX) 5 % cream, , Disp: , Rfl:     gabapentin (NEURONTIN) 300  MG capsule, Take 1 capsule by mouth 4 (Four) Times a Day As Needed (nerve pain)., Disp: 360 capsule, Rfl: 1    glimepiride (AMARYL) 2 MG tablet, TAKE 1 TABLET EVERY MORNINGBEFORE BREAKFAST, Disp: 90 tablet, Rfl: 3    glucose blood (Accu-Chek Darcy Plus) test strip, CHECK GLUCOSE TWO TIMES A  DAY FOR DIABETES MELLITUS, Disp: 200 each, Rfl: 3    glucose monitor monitoring kit, ACCU-CHECK DARCY PLUS METER as requested., Disp: 1 each, Rfl: 1    Lancets Misc. misc, TEST TWICE A DAY FOR DM. E11.9. patient requests acc-check softclick, Disp: 200 each, Rfl: 3    lidocaine (LIDODERM) 5 %, Place 1 patch on the skin as directed by provider Daily. Remove & Discard patch within 12 hours or as directed by MD, Disp: 30 patch, Rfl: 0    losartan-hydrochlorothiazide (HYZAAR) 100-12.5 MG per tablet, Take 1 tablet by mouth Daily. Indications: High Blood Pressure Disorder, Disp: 90 tablet, Rfl: 3    meloxicam (Mobic) 7.5 MG tablet, Take 1 tablet by mouth Daily., Disp: 14 tablet, Rfl: 0    metoprolol tartrate (LOPRESSOR) 50 MG tablet, TAKE 1 TABLET TWICE A DAY, Disp: 180 tablet, Rfl: 1    multivitamin with minerals tablet tablet, Take 1 tablet by mouth Daily., Disp: , Rfl:     polyethylene glycol (MiraLax) 17 g packet, Mix 238 gram powder with 64 oz of clear liquid starting at 5 pm. Drink 8 oz every 10-15 minutes until consumed., Disp: 238 each, Rfl: 0    tamsulosin (FLOMAX) 0.4 MG capsule 24 hr capsule, Take 1 capsule by mouth Daily for 360 days., Disp: 90 capsule, Rfl: 3    traZODone (DESYREL) 50 MG tablet, TAKE 1 TABLET EVERY NIGHT, Disp: 90 tablet, Rfl: 3    triamcinolone (KENALOG) 0.1 % cream, APPLY 1 APPLICATION        TOPICALLY TO THE           APPROPRIATE AREA AS        DIRECTED TWO TIMES A DAY, Disp: 80 g, Rfl: 3    Past Surgical History:   Procedure Laterality Date    COLONOSCOPY      HAND SURGERY Left     LEFT PINKY SEWN BACK ON in [place] , cut off with table saw       Family History   Problem Relation Age of Onset     "Cancer Father        Social History     Socioeconomic History    Marital status:    Tobacco Use    Smoking status: Former     Packs/day: 2.00     Years: 15.00     Additional pack years: 0.00     Total pack years: 30.00     Types: Cigarettes     Quit date: 1991     Years since quittin.1     Passive exposure: Past    Smokeless tobacco: Never   Vaping Use    Vaping Use: Never used   Substance and Sexual Activity    Alcohol use: Never    Drug use: Never    Sexual activity: Not Currently     Partners: Female        Allergies   Allergen Reactions    Metformin Diarrhea    Sulfa Antibiotics Unknown - Low Severity     Patient states he had allergic reaction to sulfa drugs when he was a child.       Review of Systems   Musculoskeletal:  Positive for arthralgias (right hip).       I have reviewed the medical and surgical history, family history, social history, medications, and/or allergies, and the review of systems of this report.    PHYSICAL EXAMINATION:       /84 (BP Location: Left arm, Patient Position: Sitting, Cuff Size: Adult)   Ht 177.8 cm (70\")   Wt 95.3 kg (210 lb)   BMI 30.13 kg/m²     GENERAL [x] Well developed  []Ill appearing [x] No acute distress    HEENT [x]No acute changes [x] Normocephalic, atraumatic    NECK [x]Supple  [] No midline tenderness    LUNGS [x]Clear bilaterally [x]No wheezes []Rhonchi [] Rales    HEART [x] Regular rate  [x] Regular rhythm [] Irregular    ABDOMEN [x] Soft [x] Not tender [x] Not distended [x] Normal sounds    VAS/EXT [x] Normal Pulses []Edema []Cyanosis             SKIN [x] Warm [x]Dry []Pink []Ecchymosis []Cool    NEURO [x] Sensation Intact [x] Motor Intact [x] Pulse intact  [] Dysesthesias:_________  []Weakness:__________             Physical Exam  Vitals and nursing note reviewed.   Constitutional:       Appearance: Normal appearance. He is not ill-appearing.   Pulmonary:      Effort: Pulmonary effort is normal.   Neurological:      Mental Status: He is " alert and oriented to person, place, and time.       Right Hip Exam     Tenderness   The patient is experiencing tenderness in the anterior.    Range of Motion   Abduction:  10   Adduction:  15   Flexion:  70     Muscle Strength   Abduction: 4/5   Adduction: 4/5   Flexion: 4/5     Tests   RAYSHAWN: positive  Fadir:  Positive FADIR test          Extremity DVT signs are negative on physical exam with negative Jenny sign, no calf pain, no palpable cords, and no skin tone change   Neurologic Exam     Mental Status   Oriented to person, place, and time.           DVT Screening: No Yes   Smoker [x] []   Personal/Family History of DVT or PE [x] []   Sedentary Lifestyle [x] []   BMI >30 [x] []      Tranexamic acid TXA criteria for usage during arthroplasty surgery.    Previous or Active DVT/PE: NO  Cardiac Stent within 1 year: NO  Embolic/Ischemic stroke within 1 year: NO  GFR less than 30ml/min (advanced kidney disease): NO  TXA  tranexemic acid summary: THIS PATIENT IS A CANDIDATE FOR IV TXA DURING SURGERY.     Independent Review of Radiographic Studies:    X-ray of the right hip 1 view performed in the clinic for the evaluation of chronic right hip pain. ( He could not abduct the right hip due to pain) Comparison films are available and reviewed. There is severe right hip Degenerative changes c/w Tonnis stage 3          Diagnoses and all orders for this visit:    1. Arthralgia of right hip (Primary)  -     XR Hip With or Without Pelvis 2 - 3 View Right; Future    2. Primary osteoarthritis of right hip  -     XR Hip With or Without Pelvis 2 - 3 View Right; Future         *SPECIAL INSTRUCTIONS:  Multi-modal analgesia.  Tranexamic acid IV protocol (see screening parameters this report).  Multi-modal DVT prophylaxis.  Rehabilitation PT/OT protocol with same day walker ambulation with therapist.      Risks, benefits, and alternative treatments discussed with the patient: [x] Yes [] No    Risk benefits and merits of the proposed  surgery were discussed and the patient's questions were answered risks discussed including and not limited to:  Anesthesia reactions  Blood loss and possible transfusion  Infection  Deep venous thrombosis and pulmonary embolus  Nerve, vascular or tendon injury  Fracture  Deformity  Stiffness  Weakness  Prosthesis and implant issues such as loosening, material wear or dislocation  Skin necrosis  Revision surgery or further treatment  Recurrence of problem and condition     Informed consent: [] Signed  [x] To be obtained at hospital  [] Both        Recommendations/Plan:  Discussion of orthopaedic goals and activities and patient expressed appreciation.  Risk, benefits, and merits of treatment alternatives reviewed with the patient and questions answered  Regular exercise as tolerated  Guided on proper techniques for mobility and conditioning exercises  The nature of the proposed surgery reviewed with patient including risks, benefits, rehabilitation, recovery time, and outcome expectations      PLANNED SURGICAL PROCEDURE: Elective right total hip arthroplasty     From cardiology:    Polina Rendon APRN   Nurse Practitioner  Specialty: Cardiology     Telephone Encounter     Signed     Encounter Date: 8/21/2023     Signed         Patient can HOLD eliquis 3-5 days pre-procedure or per surgeon's preference.  Eliquis can be resumed post-op day 1 or when surgeon deems reasonable.              BMI is >= 30 and <35. (Class 1 Obesity). The following options were offered after discussion;: weight loss educational material (shared in after visit summary)    Patient is encouraged and agreeable to call or return sooner for any issues or concerns.

## 2024-01-30 NOTE — DISCHARGE INSTRUCTIONS
Pre-Admission testing appointment completed today for patient's upcoming procedure at [facility].    PAT PASS reviewed with patient and they verbalize understanding of the following:     Do not eat or drink anything after midnight the night before procedure unless otherwise instructed by physician/surgeon's office, this includes no gum, candy, mints, tobacco products or e-cigarettes.  Do not shave the area to be operated on at least 48 hours prior to procedure.  Do not wear makeup, lotion, hair products, or nail polish.  Do not wear any jewelry and remove all piercings.  Do not wear any adhesive if you wear dentures.  Do not wear contacts; bring in glasses if needed.  Only take medications on the morning of procedure as instructed by PAT nurse per anesthesia guidelines or as instructed by physician's office.  If you are on any blood thinners reach out to the physician/surgeon's office for instructions on when/if they will need to be stopped prior to procedure.  Bring in picture ID and insurance card, advanced directive copies if applicable, CPAP/BIPAP/Inhalers if indicated morning of procedure, leave any other valuables at home.  Ensure you have arranged for someone to drive you home the day of your procedure and someone to care for you at home afterwards. It is recommended that you do not drive, drink alcohol, or make any major legal decisions for at least 24 hours after your procedure is complete.  Chlorhexadine wipes along with instruction/information sheet given to patient if indicated. Instructed patient to date, time, and initial the verification sheet once skin prep has been  completed, and to return to [facility] the day of the procedure.     Introduction to anesthesia video watched by patient during appointment.  Instructions and information given to patient about parking, hospital entrance, and registration location.

## 2024-01-30 NOTE — H&P (VIEW-ONLY)
Yehuda Capellan is a 68 y.o. right hand dominant male   Pre-op Exam of the Right Hip (Total hip arthroplasty scheduled for 2/13/24)          CHIEF COMPLAINT:    Pre-operative evaluation with history and physical exam    History of Present Illness      This is a chronic condition.    Patient presents for a planned right GINNY. He has been experiencing right hip pain due to OA for many years. He has tried FL guided hip injection which only provides temporary relief. He takes mobic for pain and uses a walker. The hip discomfort impedes on his ADL's      PAST MEDICAL HISTORY:    Past Medical History:   Diagnosis Date    Allergic     Arthritis     Atrial fibrillation     Diabetes mellitus     Elevated cholesterol     Elevated glucose 09/29/2017    A1C 6.7    Hip fracture     right, at age 17    Hyperlipidemia     Hypertension     Microalbuminuria due to type 2 diabetes mellitus 9/9/2023    Obesity          Current Outpatient Medications:     apixaban (Eliquis) 5 MG tablet tablet, Take 1 tablet by mouth Every 12 (Twelve) Hours., Disp: 180 tablet, Rfl: 2    atorvastatin (LIPITOR) 20 MG tablet, Take 1 tablet by mouth Every Night. To lower cholesterol and risk of stroke., Disp: 90 tablet, Rfl: 3    bisacodyl (Dulcolax) 5 MG EC tablet, Take 2 tablets at 3 pm and 2 tablets at 7 pm the day prior to colonoscopy, Disp: 4 tablet, Rfl: 0    Blood Glucose Calibration (Accu-Chek Darcy) solution, Use as directed. E11.9, Disp: 1 each, Rfl: 3    cetirizine (zyrTEC) 10 MG tablet, Take 1 tablet by mouth Daily., Disp: , Rfl:     Cyanocobalamin (Vitamin B-12) 1000 MCG sublingual tablet, Place  under the tongue Daily., Disp: , Rfl:     empagliflozin (Jardiance) 10 MG tablet tablet, Take 1 tablet by mouth Daily., Disp: 7 tablet, Rfl: 0    empagliflozin (Jardiance) 10 MG tablet tablet, Take 1 tablet by mouth Daily. For diabetes mellitus., Disp: 90 tablet, Rfl: 3    fluorouracil (EFUDEX) 5 % cream, , Disp: , Rfl:     gabapentin (NEURONTIN) 300  MG capsule, Take 1 capsule by mouth 4 (Four) Times a Day As Needed (nerve pain)., Disp: 360 capsule, Rfl: 1    glimepiride (AMARYL) 2 MG tablet, TAKE 1 TABLET EVERY MORNINGBEFORE BREAKFAST, Disp: 90 tablet, Rfl: 3    glucose blood (Accu-Chek Darcy Plus) test strip, CHECK GLUCOSE TWO TIMES A  DAY FOR DIABETES MELLITUS, Disp: 200 each, Rfl: 3    glucose monitor monitoring kit, ACCU-CHECK DARCY PLUS METER as requested., Disp: 1 each, Rfl: 1    Lancets Misc. misc, TEST TWICE A DAY FOR DM. E11.9. patient requests acc-check softclick, Disp: 200 each, Rfl: 3    lidocaine (LIDODERM) 5 %, Place 1 patch on the skin as directed by provider Daily. Remove & Discard patch within 12 hours or as directed by MD, Disp: 30 patch, Rfl: 0    losartan-hydrochlorothiazide (HYZAAR) 100-12.5 MG per tablet, Take 1 tablet by mouth Daily. Indications: High Blood Pressure Disorder, Disp: 90 tablet, Rfl: 3    meloxicam (Mobic) 7.5 MG tablet, Take 1 tablet by mouth Daily., Disp: 14 tablet, Rfl: 0    metoprolol tartrate (LOPRESSOR) 50 MG tablet, TAKE 1 TABLET TWICE A DAY, Disp: 180 tablet, Rfl: 1    multivitamin with minerals tablet tablet, Take 1 tablet by mouth Daily., Disp: , Rfl:     polyethylene glycol (MiraLax) 17 g packet, Mix 238 gram powder with 64 oz of clear liquid starting at 5 pm. Drink 8 oz every 10-15 minutes until consumed., Disp: 238 each, Rfl: 0    tamsulosin (FLOMAX) 0.4 MG capsule 24 hr capsule, Take 1 capsule by mouth Daily for 360 days., Disp: 90 capsule, Rfl: 3    traZODone (DESYREL) 50 MG tablet, TAKE 1 TABLET EVERY NIGHT, Disp: 90 tablet, Rfl: 3    triamcinolone (KENALOG) 0.1 % cream, APPLY 1 APPLICATION        TOPICALLY TO THE           APPROPRIATE AREA AS        DIRECTED TWO TIMES A DAY, Disp: 80 g, Rfl: 3    Past Surgical History:   Procedure Laterality Date    COLONOSCOPY      HAND SURGERY Left     LEFT PINKY SEWN BACK ON in [place] , cut off with table saw       Family History   Problem Relation Age of Onset     "Cancer Father        Social History     Socioeconomic History    Marital status:    Tobacco Use    Smoking status: Former     Packs/day: 2.00     Years: 15.00     Additional pack years: 0.00     Total pack years: 30.00     Types: Cigarettes     Quit date: 1991     Years since quittin.1     Passive exposure: Past    Smokeless tobacco: Never   Vaping Use    Vaping Use: Never used   Substance and Sexual Activity    Alcohol use: Never    Drug use: Never    Sexual activity: Not Currently     Partners: Female        Allergies   Allergen Reactions    Metformin Diarrhea    Sulfa Antibiotics Unknown - Low Severity     Patient states he had allergic reaction to sulfa drugs when he was a child.       Review of Systems   Musculoskeletal:  Positive for arthralgias (right hip).       I have reviewed the medical and surgical history, family history, social history, medications, and/or allergies, and the review of systems of this report.    PHYSICAL EXAMINATION:       /84 (BP Location: Left arm, Patient Position: Sitting, Cuff Size: Adult)   Ht 177.8 cm (70\")   Wt 95.3 kg (210 lb)   BMI 30.13 kg/m²     GENERAL [x] Well developed  []Ill appearing [x] No acute distress    HEENT [x]No acute changes [x] Normocephalic, atraumatic    NECK [x]Supple  [] No midline tenderness    LUNGS [x]Clear bilaterally [x]No wheezes []Rhonchi [] Rales    HEART [x] Regular rate  [x] Regular rhythm [] Irregular    ABDOMEN [x] Soft [x] Not tender [x] Not distended [x] Normal sounds    VAS/EXT [x] Normal Pulses []Edema []Cyanosis             SKIN [x] Warm [x]Dry []Pink []Ecchymosis []Cool    NEURO [x] Sensation Intact [x] Motor Intact [x] Pulse intact  [] Dysesthesias:_________  []Weakness:__________             Physical Exam  Vitals and nursing note reviewed.   Constitutional:       Appearance: Normal appearance. He is not ill-appearing.   Pulmonary:      Effort: Pulmonary effort is normal.   Neurological:      Mental Status: He is " alert and oriented to person, place, and time.       Right Hip Exam     Tenderness   The patient is experiencing tenderness in the anterior.    Range of Motion   Abduction:  10   Adduction:  15   Flexion:  70     Muscle Strength   Abduction: 4/5   Adduction: 4/5   Flexion: 4/5     Tests   RAYSHAWN: positive  Fadir:  Positive FADIR test          Extremity DVT signs are negative on physical exam with negative Jenny sign, no calf pain, no palpable cords, and no skin tone change   Neurologic Exam     Mental Status   Oriented to person, place, and time.           DVT Screening: No Yes   Smoker [x] []   Personal/Family History of DVT or PE [x] []   Sedentary Lifestyle [x] []   BMI >30 [x] []      Tranexamic acid TXA criteria for usage during arthroplasty surgery.    Previous or Active DVT/PE: NO  Cardiac Stent within 1 year: NO  Embolic/Ischemic stroke within 1 year: NO  GFR less than 30ml/min (advanced kidney disease): NO  TXA  tranexemic acid summary: THIS PATIENT IS A CANDIDATE FOR IV TXA DURING SURGERY.     Independent Review of Radiographic Studies:    X-ray of the right hip 1 view performed in the clinic for the evaluation of chronic right hip pain. ( He could not abduct the right hip due to pain) Comparison films are available and reviewed. There is severe right hip Degenerative changes c/w Tonnis stage 3          Diagnoses and all orders for this visit:    1. Arthralgia of right hip (Primary)  -     XR Hip With or Without Pelvis 2 - 3 View Right; Future    2. Primary osteoarthritis of right hip  -     XR Hip With or Without Pelvis 2 - 3 View Right; Future         *SPECIAL INSTRUCTIONS:  Multi-modal analgesia.  Tranexamic acid IV protocol (see screening parameters this report).  Multi-modal DVT prophylaxis.  Rehabilitation PT/OT protocol with same day walker ambulation with therapist.      Risks, benefits, and alternative treatments discussed with the patient: [x] Yes [] No    Risk benefits and merits of the proposed  surgery were discussed and the patient's questions were answered risks discussed including and not limited to:  Anesthesia reactions  Blood loss and possible transfusion  Infection  Deep venous thrombosis and pulmonary embolus  Nerve, vascular or tendon injury  Fracture  Deformity  Stiffness  Weakness  Prosthesis and implant issues such as loosening, material wear or dislocation  Skin necrosis  Revision surgery or further treatment  Recurrence of problem and condition     Informed consent: [] Signed  [x] To be obtained at hospital  [] Both        Recommendations/Plan:  Discussion of orthopaedic goals and activities and patient expressed appreciation.  Risk, benefits, and merits of treatment alternatives reviewed with the patient and questions answered  Regular exercise as tolerated  Guided on proper techniques for mobility and conditioning exercises  The nature of the proposed surgery reviewed with patient including risks, benefits, rehabilitation, recovery time, and outcome expectations      PLANNED SURGICAL PROCEDURE: Elective right total hip arthroplasty     From cardiology:    Polina Rendon APRN   Nurse Practitioner  Specialty: Cardiology     Telephone Encounter     Signed     Encounter Date: 8/21/2023     Signed         Patient can HOLD eliquis 3-5 days pre-procedure or per surgeon's preference.  Eliquis can be resumed post-op day 1 or when surgeon deems reasonable.              BMI is >= 30 and <35. (Class 1 Obesity). The following options were offered after discussion;: weight loss educational material (shared in after visit summary)    Patient is encouraged and agreeable to call or return sooner for any issues or concerns.

## 2024-01-31 LAB — MRSA SPEC QL CULT: NORMAL

## 2024-02-02 LAB
QT INTERVAL: 438 MS
QTC INTERVAL: 499 MS

## 2024-02-13 ENCOUNTER — DOCUMENTATION (OUTPATIENT)
Dept: HOME HEALTH SERVICES | Facility: HOME HEALTHCARE | Age: 69
End: 2024-02-13
Payer: MEDICARE

## 2024-02-13 ENCOUNTER — ANESTHESIA EVENT (OUTPATIENT)
Dept: PERIOP | Facility: HOSPITAL | Age: 69
End: 2024-02-13
Payer: MEDICARE

## 2024-02-13 ENCOUNTER — APPOINTMENT (OUTPATIENT)
Dept: GENERAL RADIOLOGY | Facility: HOSPITAL | Age: 69
End: 2024-02-13
Payer: MEDICARE

## 2024-02-13 ENCOUNTER — HOSPITAL ENCOUNTER (OUTPATIENT)
Facility: HOSPITAL | Age: 69
LOS: 1 days | Discharge: HOME HEALTH | End: 2024-02-14
Attending: ORTHOPAEDIC SURGERY | Admitting: ORTHOPAEDIC SURGERY
Payer: MEDICARE

## 2024-02-13 ENCOUNTER — ANESTHESIA (OUTPATIENT)
Dept: PERIOP | Facility: HOSPITAL | Age: 69
End: 2024-02-13
Payer: MEDICARE

## 2024-02-13 ENCOUNTER — ANESTHESIA EVENT CONVERTED (OUTPATIENT)
Dept: ANESTHESIOLOGY | Facility: HOSPITAL | Age: 69
End: 2024-02-13
Payer: MEDICARE

## 2024-02-13 DIAGNOSIS — G63 NEUROPATHY DUE TO MEDICAL CONDITION: ICD-10-CM

## 2024-02-13 DIAGNOSIS — Z96.641 S/P TOTAL RIGHT HIP ARTHROPLASTY: ICD-10-CM

## 2024-02-13 DIAGNOSIS — Z96.641 STATUS POST TOTAL HIP REPLACEMENT, RIGHT: Primary | ICD-10-CM

## 2024-02-13 DIAGNOSIS — M16.11 PRIMARY OSTEOARTHRITIS OF RIGHT HIP: ICD-10-CM

## 2024-02-13 PROBLEM — M16.9 OA (OSTEOARTHRITIS) OF HIP: Status: ACTIVE | Noted: 2024-02-13

## 2024-02-13 LAB
ABO GROUP BLD: NORMAL
BLD GP AB SCN SERPL QL: NEGATIVE
GLUCOSE BLDC GLUCOMTR-MCNC: 142 MG/DL (ref 70–130)
GLUCOSE BLDC GLUCOMTR-MCNC: 170 MG/DL (ref 70–130)
RH BLD: NEGATIVE
T&S EXPIRATION DATE: NORMAL

## 2024-02-13 PROCEDURE — 25010000002 VASOPRESSIN 20 UNIT/ML SOLUTION: Performed by: NURSE ANESTHETIST, CERTIFIED REGISTERED

## 2024-02-13 PROCEDURE — 25010000002 MIDAZOLAM PER 1MG: Performed by: NURSE ANESTHETIST, CERTIFIED REGISTERED

## 2024-02-13 PROCEDURE — C1776 JOINT DEVICE (IMPLANTABLE): HCPCS | Performed by: ORTHOPAEDIC SURGERY

## 2024-02-13 PROCEDURE — 27130 TOTAL HIP ARTHROPLASTY: CPT | Performed by: ORTHOPAEDIC SURGERY

## 2024-02-13 PROCEDURE — 25010000002 FENTANYL CITRATE (PF) 50 MCG/ML SOLUTION PREFILLED SYRINGE: Performed by: NURSE ANESTHETIST, CERTIFIED REGISTERED

## 2024-02-13 PROCEDURE — 25010000002 HYDROMORPHONE 1 MG/ML SOLUTION: Performed by: NURSE ANESTHETIST, CERTIFIED REGISTERED

## 2024-02-13 PROCEDURE — 25010000002 ROPIVACAINE PER 1 MG: Performed by: NURSE ANESTHETIST, CERTIFIED REGISTERED

## 2024-02-13 PROCEDURE — 97162 PT EVAL MOD COMPLEX 30 MIN: CPT

## 2024-02-13 PROCEDURE — 97530 THERAPEUTIC ACTIVITIES: CPT

## 2024-02-13 PROCEDURE — 25010000002 CEFAZOLIN SODIUM-DEXTROSE 2-3 GM-%(50ML) RECONSTITUTED SOLUTION: Performed by: ORTHOPAEDIC SURGERY

## 2024-02-13 PROCEDURE — 82948 REAGENT STRIP/BLOOD GLUCOSE: CPT

## 2024-02-13 PROCEDURE — 86900 BLOOD TYPING SEROLOGIC ABO: CPT | Performed by: PHYSICIAN ASSISTANT

## 2024-02-13 PROCEDURE — 25010000002 PROPOFOL 10 MG/ML EMULSION: Performed by: NURSE ANESTHETIST, CERTIFIED REGISTERED

## 2024-02-13 PROCEDURE — 25010000002 CEFAZOLIN PER 500 MG: Performed by: ORTHOPAEDIC SURGERY

## 2024-02-13 PROCEDURE — 86901 BLOOD TYPING SEROLOGIC RH(D): CPT | Performed by: PHYSICIAN ASSISTANT

## 2024-02-13 PROCEDURE — 97116 GAIT TRAINING THERAPY: CPT

## 2024-02-13 PROCEDURE — 25010000002 CEFAZOLIN SODIUM-DEXTROSE 2-3 GM-%(50ML) RECONSTITUTED SOLUTION: Performed by: PHYSICIAN ASSISTANT

## 2024-02-13 PROCEDURE — C1713 ANCHOR/SCREW BN/BN,TIS/BN: HCPCS | Performed by: ORTHOPAEDIC SURGERY

## 2024-02-13 PROCEDURE — 25010000002 BUPIVACAINE PF 0.75 % SOLUTION: Performed by: NURSE ANESTHETIST, CERTIFIED REGISTERED

## 2024-02-13 PROCEDURE — 86850 RBC ANTIBODY SCREEN: CPT | Performed by: PHYSICIAN ASSISTANT

## 2024-02-13 PROCEDURE — 72170 X-RAY EXAM OF PELVIS: CPT

## 2024-02-13 PROCEDURE — 88300 SURGICAL PATH GROSS: CPT

## 2024-02-13 PROCEDURE — 25810000003 LACTATED RINGERS PER 1000 ML: Performed by: ORTHOPAEDIC SURGERY

## 2024-02-13 DEVICE — IMPLANTABLE DEVICE: Type: IMPLANTABLE DEVICE | Site: HIP | Status: FUNCTIONAL

## 2024-02-13 DEVICE — SHLL ACET OSSEOTI G7 3H SZE 52MM: Type: IMPLANTABLE DEVICE | Site: HIP | Status: FUNCTIONAL

## 2024-02-13 DEVICE — HD FEM/HIP G7 BIOLOX/DELTA OPTN 36MM: Type: IMPLANTABLE DEVICE | Site: HIP | Status: FUNCTIONAL

## 2024-02-13 DEVICE — CP HIP UPCHRG OSSEOTI LTD HL CUPS: Type: IMPLANTABLE DEVICE | Status: FUNCTIONAL

## 2024-02-13 DEVICE — TOTAL HIP PRIMARY: Type: IMPLANTABLE DEVICE | Status: FUNCTIONAL

## 2024-02-13 DEVICE — SCRW ACET CORT TRILOGY S/TAP 6.5X30: Type: IMPLANTABLE DEVICE | Site: HIP | Status: FUNCTIONAL

## 2024-02-13 DEVICE — ADAPT HIP BIOLOX OPTN TYPE1 TPR MIN 3: Type: IMPLANTABLE DEVICE | Site: HIP | Status: FUNCTIONAL

## 2024-02-13 DEVICE — LINER ACET G7/LONGEVITY HI/WL HXPE SZE 36MM: Type: IMPLANTABLE DEVICE | Site: HIP | Status: FUNCTIONAL

## 2024-02-13 RX ORDER — CEFAZOLIN SODIUM 2 G/50ML
2 SOLUTION INTRAVENOUS
Status: COMPLETED | OUTPATIENT
Start: 2024-02-13 | End: 2024-02-13

## 2024-02-13 RX ORDER — CEFAZOLIN SODIUM 2 G/50ML
2000 SOLUTION INTRAVENOUS ONCE
Qty: 1 EACH | Refills: 0 | Status: COMPLETED | OUTPATIENT
Start: 2024-02-13 | End: 2024-02-13

## 2024-02-13 RX ORDER — BUPIVACAINE HYDROCHLORIDE 7.5 MG/ML
INJECTION, SOLUTION EPIDURAL; RETROBULBAR
Status: COMPLETED | OUTPATIENT
Start: 2024-02-13 | End: 2024-02-13

## 2024-02-13 RX ORDER — HYDROCHLOROTHIAZIDE 12.5 MG/1
12.5 TABLET ORAL
Status: DISCONTINUED | OUTPATIENT
Start: 2024-02-14 | End: 2024-02-14 | Stop reason: HOSPADM

## 2024-02-13 RX ORDER — NALOXONE HCL 0.4 MG/ML
0.1 VIAL (ML) INJECTION
Status: DISCONTINUED | OUTPATIENT
Start: 2024-02-13 | End: 2024-02-14 | Stop reason: HOSPADM

## 2024-02-13 RX ORDER — SODIUM CHLORIDE, SODIUM LACTATE, POTASSIUM CHLORIDE, CALCIUM CHLORIDE 600; 310; 30; 20 MG/100ML; MG/100ML; MG/100ML; MG/100ML
1000 INJECTION, SOLUTION INTRAVENOUS CONTINUOUS
Status: DISCONTINUED | OUTPATIENT
Start: 2024-02-13 | End: 2024-02-14 | Stop reason: HOSPADM

## 2024-02-13 RX ORDER — MIDAZOLAM HYDROCHLORIDE 2 MG/2ML
INJECTION, SOLUTION INTRAMUSCULAR; INTRAVENOUS AS NEEDED
Status: DISCONTINUED | OUTPATIENT
Start: 2024-02-13 | End: 2024-02-13 | Stop reason: SURG

## 2024-02-13 RX ORDER — HYDROCODONE BITARTRATE AND ACETAMINOPHEN 7.5; 325 MG/1; MG/1
1 TABLET ORAL EVERY 6 HOURS PRN
Qty: 28 TABLET | Refills: 0 | Status: SHIPPED | OUTPATIENT
Start: 2024-02-13

## 2024-02-13 RX ORDER — CETIRIZINE HYDROCHLORIDE 10 MG/1
10 TABLET ORAL DAILY
Status: DISCONTINUED | OUTPATIENT
Start: 2024-02-13 | End: 2024-02-14 | Stop reason: HOSPADM

## 2024-02-13 RX ORDER — BUPIVACAINE HCL/0.9 % NACL/PF 0.125 %
PLASTIC BAG, INJECTION (ML) EPIDURAL AS NEEDED
Status: DISCONTINUED | OUTPATIENT
Start: 2024-02-13 | End: 2024-02-13 | Stop reason: SURG

## 2024-02-13 RX ORDER — ONDANSETRON 4 MG/1
4 TABLET, ORALLY DISINTEGRATING ORAL EVERY 6 HOURS PRN
Status: DISCONTINUED | OUTPATIENT
Start: 2024-02-13 | End: 2024-02-14 | Stop reason: HOSPADM

## 2024-02-13 RX ORDER — TRANEXAMIC ACID 10 MG/ML
1000 INJECTION, SOLUTION INTRAVENOUS
Status: COMPLETED | OUTPATIENT
Start: 2024-02-13 | End: 2024-02-13

## 2024-02-13 RX ORDER — DOCUSATE SODIUM 100 MG/1
100 CAPSULE, LIQUID FILLED ORAL 2 TIMES DAILY PRN
Qty: 20 CAPSULE | Refills: 0 | Status: SHIPPED | OUTPATIENT
Start: 2024-02-13

## 2024-02-13 RX ORDER — ONDANSETRON 2 MG/ML
4 INJECTION INTRAMUSCULAR; INTRAVENOUS EVERY 6 HOURS PRN
Status: DISCONTINUED | OUTPATIENT
Start: 2024-02-13 | End: 2024-02-14 | Stop reason: HOSPADM

## 2024-02-13 RX ORDER — METOPROLOL TARTRATE 50 MG/1
50 TABLET, FILM COATED ORAL 2 TIMES DAILY
Status: DISCONTINUED | OUTPATIENT
Start: 2024-02-13 | End: 2024-02-14 | Stop reason: HOSPADM

## 2024-02-13 RX ORDER — TRAZODONE HYDROCHLORIDE 50 MG/1
50 TABLET ORAL NIGHTLY
Status: DISCONTINUED | OUTPATIENT
Start: 2024-02-13 | End: 2024-02-14 | Stop reason: HOSPADM

## 2024-02-13 RX ORDER — HYDROCODONE BITARTRATE AND ACETAMINOPHEN 10; 325 MG/1; MG/1
1 TABLET ORAL EVERY 4 HOURS PRN
Status: DISCONTINUED | OUTPATIENT
Start: 2024-02-13 | End: 2024-02-14 | Stop reason: HOSPADM

## 2024-02-13 RX ORDER — ENOXAPARIN SODIUM 100 MG/ML
40 INJECTION SUBCUTANEOUS EVERY 24 HOURS
Status: DISCONTINUED | OUTPATIENT
Start: 2024-02-14 | End: 2024-02-14 | Stop reason: HOSPADM

## 2024-02-13 RX ORDER — ATORVASTATIN CALCIUM 20 MG/1
20 TABLET, FILM COATED ORAL NIGHTLY
Status: DISCONTINUED | OUTPATIENT
Start: 2024-02-13 | End: 2024-02-14 | Stop reason: HOSPADM

## 2024-02-13 RX ORDER — TAMSULOSIN HYDROCHLORIDE 0.4 MG/1
0.4 CAPSULE ORAL DAILY
Status: DISCONTINUED | OUTPATIENT
Start: 2024-02-13 | End: 2024-02-14 | Stop reason: HOSPADM

## 2024-02-13 RX ORDER — HYDROCODONE BITARTRATE AND ACETAMINOPHEN 10; 325 MG/1; MG/1
1 TABLET ORAL ONCE AS NEEDED
Status: COMPLETED | OUTPATIENT
Start: 2024-02-13 | End: 2024-02-13

## 2024-02-13 RX ORDER — TRANEXAMIC ACID 10 MG/ML
1000 INJECTION, SOLUTION INTRAVENOUS
Status: DISCONTINUED | OUTPATIENT
Start: 2024-02-13 | End: 2024-02-13 | Stop reason: HOSPADM

## 2024-02-13 RX ORDER — EPHEDRINE SULFATE 5 MG/ML
5 INJECTION INTRAVENOUS AS NEEDED
Status: DISCONTINUED | OUTPATIENT
Start: 2024-02-13 | End: 2024-02-13 | Stop reason: HOSPADM

## 2024-02-13 RX ORDER — DOCUSATE SODIUM 100 MG/1
100 CAPSULE, LIQUID FILLED ORAL 2 TIMES DAILY
Status: DISCONTINUED | OUTPATIENT
Start: 2024-02-13 | End: 2024-02-14 | Stop reason: HOSPADM

## 2024-02-13 RX ORDER — ONDANSETRON 2 MG/ML
4 INJECTION INTRAMUSCULAR; INTRAVENOUS ONCE AS NEEDED
Status: DISCONTINUED | OUTPATIENT
Start: 2024-02-13 | End: 2024-02-13 | Stop reason: HOSPADM

## 2024-02-13 RX ORDER — FAMOTIDINE 10 MG/ML
20 INJECTION, SOLUTION INTRAVENOUS
Status: COMPLETED | OUTPATIENT
Start: 2024-02-13 | End: 2024-02-13

## 2024-02-13 RX ORDER — VASOPRESSIN 20 U/ML
INJECTION PARENTERAL AS NEEDED
Status: DISCONTINUED | OUTPATIENT
Start: 2024-02-13 | End: 2024-02-13 | Stop reason: SURG

## 2024-02-13 RX ORDER — FENTANYL CITRATE 50 UG/ML
INJECTION, SOLUTION INTRAMUSCULAR; INTRAVENOUS AS NEEDED
Status: DISCONTINUED | OUTPATIENT
Start: 2024-02-13 | End: 2024-02-13 | Stop reason: SURG

## 2024-02-13 RX ORDER — CEFAZOLIN SODIUM 2 G/50ML
2000 SOLUTION INTRAVENOUS EVERY 8 HOURS
Qty: 2 EACH | Refills: 0 | Status: COMPLETED | OUTPATIENT
Start: 2024-02-13 | End: 2024-02-14

## 2024-02-13 RX ORDER — PROPOFOL 10 MG/ML
VIAL (ML) INTRAVENOUS CONTINUOUS PRN
Status: DISCONTINUED | OUTPATIENT
Start: 2024-02-13 | End: 2024-02-13 | Stop reason: SURG

## 2024-02-13 RX ORDER — ONDANSETRON 4 MG/1
4 TABLET, FILM COATED ORAL EVERY 12 HOURS PRN
Qty: 20 TABLET | Refills: 0 | Status: SHIPPED | OUTPATIENT
Start: 2024-02-13

## 2024-02-13 RX ORDER — GLIMEPIRIDE 2 MG/1
2 TABLET ORAL
Status: DISCONTINUED | OUTPATIENT
Start: 2024-02-14 | End: 2024-02-14 | Stop reason: HOSPADM

## 2024-02-13 RX ORDER — LOSARTAN POTASSIUM 50 MG/1
100 TABLET ORAL
Status: DISCONTINUED | OUTPATIENT
Start: 2024-02-14 | End: 2024-02-14 | Stop reason: HOSPADM

## 2024-02-13 RX ORDER — KETAMINE HCL IN NACL, ISO-OSM 100MG/10ML
SYRINGE (ML) INJECTION AS NEEDED
Status: DISCONTINUED | OUTPATIENT
Start: 2024-02-13 | End: 2024-02-13 | Stop reason: SURG

## 2024-02-13 RX ORDER — ROPIVACAINE HYDROCHLORIDE 5 MG/ML
INJECTION, SOLUTION EPIDURAL; INFILTRATION; PERINEURAL
Status: DISCONTINUED | OUTPATIENT
Start: 2024-02-13 | End: 2024-02-13 | Stop reason: SURG

## 2024-02-13 RX ADMIN — BUPIVACAINE HYDROCHLORIDE 2 ML: 7.5 INJECTION, SOLUTION EPIDURAL; RETROBULBAR at 08:25

## 2024-02-13 RX ADMIN — EMPAGLIFLOZIN 10 MG: 10 TABLET, FILM COATED ORAL at 18:26

## 2024-02-13 RX ADMIN — ATORVASTATIN CALCIUM 20 MG: 20 TABLET, FILM COATED ORAL at 20:30

## 2024-02-13 RX ADMIN — Medication 200 MCG: at 08:34

## 2024-02-13 RX ADMIN — CEFAZOLIN SODIUM 2000 MG: 2 SOLUTION INTRAVENOUS at 21:05

## 2024-02-13 RX ADMIN — VASOPRESSIN 5 UNITS: 20 INJECTION INTRAVENOUS at 09:47

## 2024-02-13 RX ADMIN — CETIRIZINE HYDROCHLORIDE 10 MG: 10 TABLET, FILM COATED ORAL at 18:26

## 2024-02-13 RX ADMIN — SODIUM CHLORIDE, POTASSIUM CHLORIDE, SODIUM LACTATE AND CALCIUM CHLORIDE 1000 ML: 600; 310; 30; 20 INJECTION, SOLUTION INTRAVENOUS at 06:59

## 2024-02-13 RX ADMIN — TRAZODONE HYDROCHLORIDE 50 MG: 50 TABLET ORAL at 20:30

## 2024-02-13 RX ADMIN — HYDROCODONE BITARTRATE AND ACETAMINOPHEN 1 TABLET: 10; 325 TABLET ORAL at 15:31

## 2024-02-13 RX ADMIN — HYDROCODONE BITARTRATE AND ACETAMINOPHEN 1 TABLET: 10; 325 TABLET ORAL at 20:30

## 2024-02-13 RX ADMIN — FAMOTIDINE 20 MG: 10 INJECTION INTRAVENOUS at 07:00

## 2024-02-13 RX ADMIN — TAMSULOSIN HYDROCHLORIDE 0.4 MG: 0.4 CAPSULE ORAL at 18:26

## 2024-02-13 RX ADMIN — METOPROLOL TARTRATE 50 MG: 50 TABLET ORAL at 20:30

## 2024-02-13 RX ADMIN — ROPIVACAINE HYDROCHLORIDE 30 ML: 5 INJECTION, SOLUTION EPIDURAL; INFILTRATION; PERINEURAL at 11:34

## 2024-02-13 RX ADMIN — Medication 20 MG: at 08:29

## 2024-02-13 RX ADMIN — HYDROMORPHONE HYDROCHLORIDE 0.5 MG: 1 INJECTION, SOLUTION INTRAMUSCULAR; INTRAVENOUS; SUBCUTANEOUS at 11:10

## 2024-02-13 RX ADMIN — Medication 100 MCG: at 08:59

## 2024-02-13 RX ADMIN — Medication 20 MG: at 08:45

## 2024-02-13 RX ADMIN — CEFAZOLIN SODIUM 2 G: 2 SOLUTION INTRAVENOUS at 08:15

## 2024-02-13 RX ADMIN — PROPOFOL 75 MCG/KG/MIN: 10 INJECTION, EMULSION INTRAVENOUS at 08:32

## 2024-02-13 RX ADMIN — VASOPRESSIN 5 UNITS: 20 INJECTION INTRAVENOUS at 10:00

## 2024-02-13 RX ADMIN — FENTANYL CITRATE 50 MCG: 50 INJECTION, SOLUTION INTRAMUSCULAR; INTRAVENOUS at 11:26

## 2024-02-13 RX ADMIN — Medication 10 MG: at 08:20

## 2024-02-13 RX ADMIN — MIDAZOLAM HYDROCHLORIDE 2 MG: 1 INJECTION, SOLUTION INTRAMUSCULAR; INTRAVENOUS at 11:26

## 2024-02-13 RX ADMIN — EPHEDRINE SULFATE 5 MG: 5 INJECTION INTRAVENOUS at 11:57

## 2024-02-13 RX ADMIN — EPHEDRINE SULFATE 5 MG: 5 INJECTION INTRAVENOUS at 12:09

## 2024-02-13 RX ADMIN — TRANEXAMIC ACID 1000 MG: 10 INJECTION, SOLUTION INTRAVENOUS at 08:39

## 2024-02-13 RX ADMIN — CEFAZOLIN SODIUM 2000 MG: 2 SOLUTION INTRAVENOUS at 12:23

## 2024-02-13 RX ADMIN — FENTANYL CITRATE 50 MCG: 50 INJECTION, SOLUTION INTRAMUSCULAR; INTRAVENOUS at 08:20

## 2024-02-13 RX ADMIN — VASOPRESSIN 4 UNITS: 20 INJECTION INTRAVENOUS at 09:24

## 2024-02-13 RX ADMIN — MIDAZOLAM HYDROCHLORIDE 2 MG: 1 INJECTION, SOLUTION INTRAMUSCULAR; INTRAVENOUS at 08:20

## 2024-02-13 NOTE — CASE MANAGEMENT/SOCIAL WORK
Amber student contacted pt's spouse. Pt already has a walker. Pt has requested [facility] as home health agency. Santana with follow up with request.    Deirdre Joe, Social Work Student

## 2024-02-13 NOTE — OP NOTE
[place] [address] [facility], [address]  [place]  [address] [phone]      OPERATIVE REPORT         PATIENT NAME:  Yehuda Capellan                            YOB: 1955        PREOP DIAGNOSIS:  Right hip advanced end-stage osteoarthritis    POSTOP DIAGNOSIS:  Right hip advanced end-stage osteoarthritis    PROCEDURE:  Right total hip replacement.    Surgical Approach:   Hip Posterior -  Lateral    SURGEON:   Loyd Dickens MD    OPERATIVE TEAM:  Circulator: Terence Velazquez RN; Abel Castro RN  Scrub Person: Larry Villatoro; Yvonne Ash, JANETTE; Yee Cabezas    ANESTHETIST:  CRNA: Remigio Gallo CRNA    ANESTHESIA:  Spinal    FLUIDS:   2000 ml crystalloid    ESTIMATED BLOOD LOSS: 300 ml      URINE OUTPUT:  200 ml    SPECIMENS:   Femoral head sent to Pathology.    DRAINS:   Medel to gravity.    FINDINGS:   Severe degenerative arthritis, osteophytes, erosions, cysts, deformity of femoral head, deformity and migration of acetabulum, calcified labrum and bone on bone wear.    HARDWARE:                         Biomet Taperloc Complete press fit total hip  replacement with a #15 stem with high  offset neck, 133 degree neck angle, -3 mm  neck, 36 mm ceramic head, 52 mm  acetabular cup with two superior quadrant  screws (30 mm, 30 mm) and 10 degree  posterior high wall cross-linked polyethylene  liner.      COMPLICATIONS:  None.    DISPOSITION:  Stable to recovery.    INDICATIONS/NARRATIVE:  The patient presents for planned total hip replacement.  The patient has persistent daily pain, limited ambulation and activity intolerance, hip stiffness, deformity, and limitations related to advanced degenerative joint disease of the hip.  Treatment alternatives have been discussed, and the patient wishes to proceed with elective total hip replacement.  Risks and benefits of the proposed procedure have been discussed, and informed consent obtained.  Risks were discussed including but  not limited to, anesthesia, infection, nerve/vessel/tendon injury, fracture, prosthetic wear, loosening or dislocation, DVT, pulmonary embolus, blood loss and the possible need for transfusion.  Arrangements have been made for tranexamic acid IV protocol.  Goals of the procedure include the potential for pain relief, improved hip motion, limb alignment and improved tolerance with ambulation and activities.      Antibiotic prophylaxis was given.  Surgeon site marking and a time out were performed.  Anesthesia was effective and well tolerated.  The hip and leg were prepped and draped in the usual sterile fashion.  The patient was placed in the lateral decubitus position for the procedure, with care taken to pad all areas of the body including a bean bag mold, axillary roll, and fibular head and malleolar padding.      After sterile prep and drape, a curved incision was made centered on the greater trochanter of the hip.  Dissection proceeded in line with the skin incision and through the tensor fascia juan.  A Charnley self-retaining retractor was placed.  The hip was gently internally rotated and a blunt Cobra was placed under the gluteus medius.  The piriformis tendon and short external rotators were released from the fossa and tagged for subsequent repair.  A T capsulotomy was performed and the capsule was tagged for repair.  Synovial joint fluid expressed and suctioned.  There were marked degenerative changes, cartilage worn down to bone and a deformed femoral head and acetabulum.  Using the neck-cutting template, with the desired slope and position, a neck cut was made with a saw.  The femoral head was extracted from the hip, and sent to pathology as a specimen.      After debridement of the osteophytes, reaming of the acetabulum was performed that provided a concentric acetabular socket for the implant.  A trial cup provided an excellent press fit with no toggling.  Care was taken to ream and place the cup in  15-20 degrees of anteversion and 45-50 degrees of inclination.  The trial component was removed, and the acetabulum was irrigated copiously.  Then, the actual acetabular shell was impacted in place and obtained an excellent press fit, with good position.  The fixation was reinforced with superior quadrant screws, with standard drilling depth gauge measurement, and placement of screws.  Then, a trial liner was placed onto the cup.    Next, steps were taken to prepare the femur.  A box-cutting osteotome was used to lateralize the entry to the canal and along the greater trochanter.  Sequential broaching with the broach was performed, up to the best-fit sizes, which provided an excellent press fit, no toggling.  Care was taken to broach 15-20 degrees of femoral anteversion.  Next, trial heads and necks were placed to find the best range of motion and stability.  There was smooth, free flexion, full extension of the hip and smooth full external and internal rotation with stability.  The trial components were removed.  The wound and bone surfaces were pulse irrigated with copious antibiotic solution, and then suctioned.  Next, the actual stem was placed, which had an excellent press fit that was in good position.  The trial cup liner was removed and the actual insert was placed.  The actual head was then Thompson tapered onto the femoral stem.  A final reduction was performed.  Clinically the leg lengths were equal and there was full range of motion and stability of the hip.  The wound was irrigated copiously.      Routine closure was performed and consisted of interrupted #1 Vicryl to repair the capsule, #5 non-absorbable Ethibond to repair the piriformis tendon, #1 Vicryl to repair the tensor fascia juan, 2-0 Vicryl for the deep and superficial subcutaneous layers, and staples for the skin.  A sterile Aquacel dressing was applied.  An abduction pillow was placed and the patient was moved supine on the hospital bed.   Anesthesia was effective and well tolerated.  There were no complications of surgery.  The patient was transferred in stable condition to the recovery room and x-rays of the pelvis and hip were requested.

## 2024-02-13 NOTE — PLAN OF CARE
Goal Outcome Evaluation:  Plan of Care Reviewed With: patient, spouse, daughter        Progress: no change  Outcome Evaluation: Pt participated in PT eval this date. Pt s/p GINNY on the R LE. Pt reports pain 5/10 R LE. reviewed posterior hip precautions and home set up, left packet with information. Pt required modA for t/f to EOB, able to move R LE with assist. Pt assisted with Rwx and modA to standing, cues for hand placement to not pull on Rwx. Pt required x3 attempts to get to standing. In standing, Pt's R LE unable to get straight, Pt leaning very far anterior. Nsg assisted with pericare following BM. Pt eventually able to attempt taking side steps, however unable to weight bear through the R LE, wit hknee buckling and requiring modA to remain upright. Pt returned to supine. Explained to Pt that R LE still having difficulty with movemetn adn motor control following block, and would be unsafe to attempt ambulation at this time. Pt and family verbalized understanding, relayed info to RN. Pt would benefit from skilled PT tx to address deficits in strength, transfers and mobility.      Anticipated Discharge Disposition (PT): home with assist, home with home health

## 2024-02-13 NOTE — PLAN OF CARE
Problem: Adult Inpatient Plan of Care  Goal: Plan of Care Review  Outcome: Ongoing, Progressing  Flowsheets  Taken 2/13/2024 1817 by Deja Amaro, RN  Progress: no change  Taken 2/13/2024 1547 by Soni Roe RN  Plan of Care Reviewed With: patient   Goal Outcome Evaluation:           Progress: no change               VSS, room air. Patient admitted to 3rd floor from same day surgery. Patient resting comfortably at this time. Will continue to monitor.

## 2024-02-13 NOTE — DISCHARGE INSTRUCTIONS
No pushing, pulling, tugging,  heavy lifting, or strenuous activity.  No major decision making, driving, or drinking alcoholic beverages for 24 hours. ( due to the medications you have  received)  Always use good hand hygiene/washing techniques.  NO driving while taking pain medications.    * if you have an incision:  Check your incision area every day for signs of infection.   Check for:  * more redness, swelling, or pain  *more fluid or blood  *warmth  *pus or bad smell.    To assist you in voiding:  Drink plenty of fluids  Listen to running water while attempting to void.    If you are unable to urinate and you have an uncomfortable urge to void or it has been   6 hours since you were discharged, return to the Emergency Room.    Ice on incision for 20-30 minutes or no longer than 2 hours, remove, and reapply in 2-3 hours.  If you do 2 hours at one time, no more than 2-3 times in 24 hours.    Keep your right leg elevated as directed per PT.

## 2024-02-13 NOTE — THERAPY EVALUATION
"Patient Name: Yehuda Capellan  : 1955    MRN: [id number]                              Today's Date: 2024       Admit Date: 2024    Visit Dx:     ICD-10-CM ICD-9-CM   1. Status post total hip replacement, right  Z96.641 V43.64   2. Primary osteoarthritis of right hip  M16.11 715.15   3. Neuropathy due to medical condition  G63 357.4     Patient Active Problem List   Diagnosis    Atrial fibrillation, chronic    Dyslipidemia    Essential hypertension    Type 2 diabetes mellitus with hyperglycemia, without long-term current use of insulin    Multinodular goiter (nontoxic)    Arthralgia of right hip    Primary osteoarthritis of right hip    Microalbuminuria due to type 2 diabetes mellitus    Screening for colon cancer    OA (osteoarthritis) of hip    Status post total hip replacement, right     Past Medical History:   Diagnosis Date    Allergic     Arthritis     Atrial fibrillation     \"permanent\" per Dr. Grajeda note on 23; on Eliquis    Diabetes mellitus     Elevated cholesterol     Elevated glucose 2017    A1C 6.7    Hip fracture     right, at age 17    Hyperlipidemia     Hypertension     Microalbuminuria due to type 2 diabetes mellitus 2023    Obesity     Recurrent UTI     seen by Dr. Molina      Past Surgical History:   Procedure Laterality Date    COLONOSCOPY      HAND SURGERY Left     LEFT PINKY SEWN BACK ON in [place] , cut off with table saw      General Information       Row Name 24 1515          Physical Therapy Time and Intention    Document Type evaluation  -MS     Mode of Treatment physical therapy  -MS       Row Name 24 1515          General Information    Patient Profile Reviewed yes  -MS     Prior Level of Function independent:;all household mobility;community mobility  -MS     Existing Precautions/Restrictions fall  -MS     Barriers to Rehab medically complex;previous functional deficit  -MS       Row Name 24 1515          Living Environment    " People in Home spouse  -MS       Row Name 02/13/24 1515          Home Main Entrance    Number of Stairs, Main Entrance none  ramp  -MS       Row Name 02/13/24 1515          Cognition    Orientation Status (Cognition) oriented x 4  -MS       Row Name 02/13/24 1515          Safety Issues, Functional Mobility    Safety Issues Affecting Function (Mobility) insight into deficits/self-awareness;safety precautions follow-through/compliance;ability to follow commands;positioning of assistive device;awareness of need for assistance;safety precaution awareness;sequencing abilities  -MS     Impairments Affecting Function (Mobility) balance;endurance/activity tolerance;motor control;muscle tone abnormal;pain;range of motion (ROM);shortness of breath;strength  -MS               User Key  (r) = Recorded By, (t) = Taken By, (c) = Cosigned By      Initials Name Provider Type    MS Alejandro Hdz, PT Physical Therapist                   Mobility       Row Name 02/13/24 1522          Bed Mobility    Bed Mobility supine-sit;sit-supine  -MS     All Activities, [place] (Bed Mobility) minimum assist (75% patient effort)  -MS     Supine-Sit [place] (Bed Mobility) minimum assist (75% patient effort)  -MS     Sit-Supine [place] (Bed Mobility) moderate assist (50% patient effort)  -MS     Assistive Device (Bed Mobility) bed rails;draw sheet;head of bed elevated  -MS       Row Name 02/13/24 1522          Sit-Stand Transfer    Sit-Stand [place] (Transfers) moderate assist (50% patient effort)  -MS     Assistive Device (Sit-Stand Transfers) walker, front-wheeled  -MS     Comment, (Sit-Stand Transfer) difficulty achieving upright standing due to R knee buckling  -MS       Row Name 02/13/24 1522          Gait/Stairs (Locomotion)    [place] Level (Gait) moderate assist (50% patient effort)  -MS     Assistive Device (Gait) walker, front-wheeled  -MS     Patient was able to Ambulate no, other medical factors prevent  ambulation  -MS     Reason Patient was unable to Ambulate Limited Motor Function related to Block  -MS     Distance in Feet (Gait) --  did take 2 side steps to HOB  -MS     Right Sided Gait Deviations knee buckling, right side;lateral trunk flexion;forward flexed posture;weight shift ability decreased  -MS               User Key  (r) = Recorded By, (t) = Taken By, (c) = Cosigned By      Initials Name Provider Type    MS Alejandro Hdz, PT Physical Therapist                   Obj/Interventions       Row Name 02/13/24 1525          Range of Motion Comprehensive    General Range of Motion lower extremity range of motion deficits identified  -MS       Row Name 02/13/24 1525          Strength Comprehensive (MMT)    General Manual Muscle Testing (MMT) Assessment lower extremity strength deficits identified  -MS     Comment, General Manual Muscle Testing (MMT) Assessment R LE weakness, able to perform partial ROM on R LE, L LE strength 4/5  -MS               User Key  (r) = Recorded By, (t) = Taken By, (c) = Cosigned By      Initials Name Provider Type    MS Alejandro Hdz, PT Physical Therapist                   Goals/Plan       Row Name 02/13/24 1550          Bed Mobility Goal 1 (PT)    Activity/Assistive Device (Bed Mobility Goal 1, PT) bed mobility activities, all  -MS     [place] Level/Cues Needed (Bed Mobility Goal 1, PT) modified independence  -MS     Time Frame (Bed Mobility Goal 1, PT) long term goal (LTG);2 weeks  -MS       Row Name 02/13/24 1550          Transfer Goal 1 (PT)    Activity/Assistive Device (Transfer Goal 1, PT) transfers, all;sit-to-stand/stand-to-sit  -MS     [place] Level/Cues Needed (Transfer Goal 1, PT) modified independence  -MS     Time Frame (Transfer Goal 1, PT) long term goal (LTG);2 weeks  -MS       Row Name 02/13/24 1550          Gait Training Goal 1 (PT)    Activity/Assistive Device (Gait Training Goal 1, PT) gait (walking locomotion);assistive device use  -MS      [employer] Level (Gait Training Goal 1, PT) modified independence  -MS     Distance (Gait Training Goal 1, PT) 250ft  -MS     Time Frame (Gait Training Goal 1, PT) long term goal (LTG);2 weeks  -MS       Row Name 02/13/24 1550          Patient Education Goal (PT)    Activity (Patient Education Goal, PT) ther exer on B LE x15 reps ea  -MS     [employer]/Cues/Accuracy (Memory Goal 2, PT) demonstrates adequately  -MS     Time Frame (Patient Education Goal, PT) short term goal (STG);1 week  -MS       Row Name 02/13/24 1550          Therapy Assessment/Plan (PT)    Planned Therapy Interventions (PT) balance training;bed mobility training;gait training;home exercise program;strengthening;stair training;ROM (range of motion);postural re-education;patient/family education;neuromuscular re-education  -MS               User Key  (r) = Recorded By, (t) = Taken By, (c) = Cosigned By      Initials Name Provider Type    Alejandro Melo, PT Physical Therapist                   Clinical Impression       Row Name 02/13/24 1533          Pain    Pretreatment Pain Rating 5/10  -MS     Posttreatment Pain Rating 5/10  -MS     Pain Location - Side/Orientation Right  -MS     Pain Location - hip  -MS       Row Name 02/13/24 1533          Plan of Care Review    Plan of Care Reviewed With patient;spouse;daughter  -MS     Progress no change  -MS     Outcome Evaluation Pt participated in PT eval this date. Pt s/p GINNY on the R LE. Pt reports pain 5/10 R LE. reviewed posterior hip precautions and home set up, left packet with information. Pt required modA for t/f to EOB, able to move R LE with assist. Pt assisted with Rwx and modA to standing, cues for hand placement to not pull on Rwx. Pt required x3 attempts to get to standing. In standing, Pt's R LE unable to get straight, Pt leaning very far anterior. Nsg assisted with pericare following BM. Pt eventually able to attempt taking side steps, however unable to weight bear through the R  LE, wit hknee buckling and requiring modA to remain upright. Pt returned to supine. Explained to Pt that R LE still having difficulty with movemetn adn motor control following block, and would be unsafe to attempt ambulation at this time. Pt and family verbalized understanding, relayed info to RN. Pt would benefit from skilled PT tx to address deficits in strength, transfers and mobility.  -MS       Row Name 02/13/24 1533          Therapy Assessment/Plan (PT)    Rehab Potential (PT) good, to achieve stated therapy goals  -MS     Criteria for Skilled Interventions Met (PT) yes;meets criteria  -MS     Therapy Frequency (PT) 5 times/wk  -MS       Row Name 02/13/24 1533          Vital Signs    O2 Delivery Pre Treatment room air  -MS     O2 Delivery Intra Treatment room air  -MS     O2 Delivery Post Treatment room air  -MS     Pre Patient Position Supine  -MS     Intra Patient Position Standing  -MS     Post Patient Position Supine  -MS       Row Name 02/13/24 1533          Positioning and Restraints    Pre-Treatment Position in bed  -MS     Post Treatment Position bed  -MS     In Bed fowlers;call light within reach;encouraged to call for assist  -MS               User Key  (r) = Recorded By, (t) = Taken By, (c) = Cosigned By      Initials Name Provider Type    Alejandro Melo, PT Physical Therapist                   Outcome Measures       Row Name 02/13/24 1317 02/13/24 1247       5 Meter Walk    Was an Assistive Device Used? No  -RB No  -RB      Row Name 02/13/24 1529          How much help from another person do you currently need...    Turning from your back to your side while in flat bed without using bedrails? 2  -MS     Moving from lying on back to sitting on the side of a flat bed without bedrails? 2  -MS     Moving to and from a bed to a chair (including a wheelchair)? 2  -MS     Standing up from a chair using your arms (e.g., wheelchair, bedside chair)? 2  -MS     Climbing 3-5 steps with a railing? 1  -MS      To walk in hospital room? 1  -MS     AM-PAC 6 Clicks Score (PT) 10  -MS     Highest Level of Mobility Goal 4 --> Transfer to chair/commode  -MS       Row Name 02/13/24 1550          Functional Assessment    Outcome Measure Options AM-PAC 6 Clicks Basic Mobility (PT)  -MS               User Key  (r) = Recorded By, (t) = Taken By, (c) = Cosigned By      Initials Name Provider Type    Soni Ambriz RN Registered Nurse    Alejandro Melo, PT Physical Therapist                                   PT Recommendation and Plan  Planned Therapy Interventions (PT): balance training, bed mobility training, gait training, home exercise program, strengthening, stair training, ROM (range of motion), postural re-education, patient/family education, neuromuscular re-education  Plan of Care Reviewed With: patient, spouse, daughter  Progress: no change  Outcome Evaluation: Pt participated in PT eval this date. Pt s/p GINNY on the R LE. Pt reports pain 5/10 R LE. reviewed posterior hip precautions and home set up, left packet with information. Pt required modA for t/f to EOB, able to move R LE with assist. Pt assisted with Rwx and modA to standing, cues for hand placement to not pull on Rwx. Pt required x3 attempts to get to standing. In standing, Pt's R LE unable to get straight, Pt leaning very far anterior. Nsg assisted with pericare following BM. Pt eventually able to attempt taking side steps, however unable to weight bear through the R LE, wit hknee buckling and requiring modA to remain upright. Pt returned to supine. Explained to Pt that R LE still having difficulty with movemetn adn motor control following block, and would be unsafe to attempt ambulation at this time. Pt and family verbalized understanding, relayed info to RN. Pt would benefit from skilled PT tx to address deficits in strength, transfers and mobility.     Time Calculation:   PT Evaluation Complexity  History, PT Evaluation Complexity: 1-2 personal  factors and/or comorbidities  Examination of Body Systems (PT Eval Complexity): total of 3 or more elements  Clinical Presentation (PT Evaluation Complexity): evolving  Clinical Decision Making (PT Evaluation Complexity): moderate complexity  Overall Complexity (PT Evaluation Complexity): moderate complexity     PT Charges       Row Name 02/13/24 1553             Time Calculation    Start Time 1427  -MS      PT Received On 02/13/24  -MS      PT Goal Re-Cert Due Date 02/23/24  -MS         Untimed Charges    PT Eval/Re-eval Minutes 70  -MS         Total Minutes    Untimed Charges Total Minutes 70  -MS       Total Minutes 70  -MS                User Key  (r) = Recorded By, (t) = Taken By, (c) = Cosigned By      Initials Name Provider Type    MS Alejandro Hdz, PT Physical Therapist                  Therapy Charges for Today       Code Description Service Date Service Provider Modifiers Qty    [id number] HC-PT EVAL MOD COMPLEXITY 5 2/13/2024 Alejandro Hdz, PT  1            PT G-Codes  Outcome Measure Options: AM-PAC 6 Clicks Basic Mobility (PT)  AM-PAC 6 Clicks Score (PT): 10  PT Discharge Summary  Anticipated Discharge Disposition (PT): home with assist, home with home health    Alejandro Hdz PT  2/13/2024

## 2024-02-13 NOTE — CASE MANAGEMENT/SOCIAL WORK
Case Management/Social Work    Patient Name:  Yehuda Capellan  YOB: 1955  MRN: [id number]  Admit Date:  2/13/2024        VALORIE spoke with [facility]  who can accept for services.       Electronically signed by:  KRYSTAL Whiting  02/13/24 15:51 EST

## 2024-02-13 NOTE — PROGRESS NOTES
Pharmacy Consult - Enoxaparin Dosing  Yehuda Capellan is a 68 y.o. male who has been consulted to dose enoxaparin for post surgical GINNY VTE prophylaxis.     Allergies  Metformin and Sulfa antibiotics    Relevant clinical data and objective history reviewed:   [Ht:  ; Wt:  ]  There is no height or weight on file to calculate BMI.  Estimated Creatinine Clearance: 86.5 mL/min (by C-G formula based on SCr of 0.92 mg/dL).        Asessment/Plan  Initiate Enoxaparin 40 mg SQ every 24 hours  Pharmacy will monitor Mr. Capellan's renal function and clinical status and adjust the enoxaparin dose and/or frequency as needed.    Thank you for the consult,     Sy Rosado, BrayanD, BCPS   2/13/2024  18:21 EST

## 2024-02-13 NOTE — INTERVAL H&P NOTE
H&P reviewed. The patient was examined and there are no changes to the H&P.    Vitals:    02/13/24 0632   BP: 117/89   Pulse: 68   Resp: 16   Temp: 97.1 °F (36.2 °C)   SpO2: 96%       Modesto Dickens MD  2/13/2024  07:42 EST

## 2024-02-14 ENCOUNTER — HOME HEALTH ADMISSION (OUTPATIENT)
Dept: HOME HEALTH SERVICES | Facility: HOME HEALTHCARE | Age: 69
End: 2024-02-14
Payer: COMMERCIAL

## 2024-02-14 VITALS
SYSTOLIC BLOOD PRESSURE: 139 MMHG | TEMPERATURE: 96.9 F | HEIGHT: 70 IN | RESPIRATION RATE: 19 BRPM | HEART RATE: 93 BPM | BODY MASS INDEX: 30.08 KG/M2 | DIASTOLIC BLOOD PRESSURE: 75 MMHG | WEIGHT: 210.1 LBS | OXYGEN SATURATION: 96 %

## 2024-02-14 LAB
GLUCOSE BLDC GLUCOMTR-MCNC: 201 MG/DL (ref 70–130)
REF LAB TEST METHOD: NORMAL

## 2024-02-14 PROCEDURE — 82948 REAGENT STRIP/BLOOD GLUCOSE: CPT

## 2024-02-14 PROCEDURE — 99024 POSTOP FOLLOW-UP VISIT: CPT | Performed by: PHYSICIAN ASSISTANT

## 2024-02-14 PROCEDURE — 25010000002 CEFAZOLIN SODIUM-DEXTROSE 2-3 GM-%(50ML) RECONSTITUTED SOLUTION: Performed by: ORTHOPAEDIC SURGERY

## 2024-02-14 PROCEDURE — 97165 OT EVAL LOW COMPLEX 30 MIN: CPT

## 2024-02-14 PROCEDURE — 25010000002 ENOXAPARIN PER 10 MG: Performed by: PHYSICIAN ASSISTANT

## 2024-02-14 PROCEDURE — 97110 THERAPEUTIC EXERCISES: CPT

## 2024-02-14 PROCEDURE — 97116 GAIT TRAINING THERAPY: CPT

## 2024-02-14 RX ADMIN — CEFAZOLIN SODIUM 2000 MG: 2 SOLUTION INTRAVENOUS at 05:04

## 2024-02-14 RX ADMIN — TAMSULOSIN HYDROCHLORIDE 0.4 MG: 0.4 CAPSULE ORAL at 09:06

## 2024-02-14 RX ADMIN — EMPAGLIFLOZIN 10 MG: 10 TABLET, FILM COATED ORAL at 09:06

## 2024-02-14 RX ADMIN — CETIRIZINE HYDROCHLORIDE 10 MG: 10 TABLET, FILM COATED ORAL at 09:06

## 2024-02-14 RX ADMIN — METOPROLOL TARTRATE 50 MG: 50 TABLET ORAL at 09:06

## 2024-02-14 RX ADMIN — HYDROCODONE BITARTRATE AND ACETAMINOPHEN 1 TABLET: 10; 325 TABLET ORAL at 06:54

## 2024-02-14 RX ADMIN — LOSARTAN POTASSIUM 100 MG: 50 TABLET, FILM COATED ORAL at 09:06

## 2024-02-14 RX ADMIN — HYDROCODONE BITARTRATE AND ACETAMINOPHEN 1 TABLET: 10; 325 TABLET ORAL at 00:34

## 2024-02-14 RX ADMIN — HYDROCHLOROTHIAZIDE 12.5 MG: 12.5 CAPSULE ORAL at 09:06

## 2024-02-14 RX ADMIN — ENOXAPARIN SODIUM 40 MG: 100 INJECTION SUBCUTANEOUS at 05:04

## 2024-02-14 NOTE — THERAPY TREATMENT NOTE
"Patient Name: Yehuda Capellan  : 1955    MRN: [id number]                              Today's Date: 2024       Admit Date: 2024    Visit Dx:     ICD-10-CM ICD-9-CM   1. Status post total hip replacement, right  Z96.641 V43.64   2. Primary osteoarthritis of right hip  M16.11 715.15   3. Neuropathy due to medical condition  G63 357.4   4. S/P total right hip arthroplasty  Z96.641 V43.64     Patient Active Problem List   Diagnosis    Atrial fibrillation, chronic    Dyslipidemia    Essential hypertension    Type 2 diabetes mellitus with hyperglycemia, without long-term current use of insulin    Multinodular goiter (nontoxic)    Arthralgia of right hip    Primary osteoarthritis of right hip    Microalbuminuria due to type 2 diabetes mellitus    Screening for colon cancer    OA (osteoarthritis) of hip    Status post total hip replacement, right    S/P total right hip arthroplasty     Past Medical History:   Diagnosis Date    Allergic     Arthritis     Atrial fibrillation     \"permanent\" per Dr. Grajeda note on 23; on Eliquis    Diabetes mellitus     Elevated cholesterol     Elevated glucose 2017    A1C 6.7    Hip fracture     right, at age 17    Hyperlipidemia     Hypertension     Impaired functional mobility, balance, gait, and endurance     Microalbuminuria due to type 2 diabetes mellitus 2023    Obesity     Recurrent UTI     seen by Dr. Molina      Past Surgical History:   Procedure Laterality Date    COLONOSCOPY      HAND SURGERY Left     LEFT PINKY SEWN BACK ON in [place] , cut off with table saw    TOTAL HIP ARTHROPLASTY Right 2024    Procedure: Right total hip arthroplasty (BIOMET);  Surgeon: Modesto Dickens MD;  Location: [facility];  Service: Orthopedics;  Laterality: Right;      General Information       Row Name 24 1027          Physical Therapy Time and Intention    Document Type therapy note (daily note)  -RM     Mode of Treatment physical therapy  -RM  "      Row Name 02/14/24 1027          General Information    Patient Profile Reviewed yes  -RM     Existing Precautions/Restrictions fall  -RM       Row Name 02/14/24 1027          Cognition    Orientation Status (Cognition) oriented x 4  -RM       Row Name 02/14/24 1027          Safety Issues, Functional Mobility    Safety Issues Affecting Function (Mobility) insight into deficits/self-awareness;awareness of need for assistance;positioning of assistive device;problem-solving;safety precaution awareness;safety precautions follow-through/compliance  -RM     Impairments Affecting Function (Mobility) balance;pain;strength  -RM               User Key  (r) = Recorded By, (t) = Taken By, (c) = Cosigned By      Initials Name Provider Type    David Todd PTA Physical Therapist Assistant                   Mobility       Row Name 02/14/24 1029          Bed Mobility    Comment, (Bed Mobility) Pt sitting [facility]  -RM       Row Name 02/14/24 1029          Sit-Stand Transfer    Sit-Stand Sawyer (Transfers) contact guard;verbal cues;nonverbal cues (demo/gesture)  -RM     Assistive Device (Sit-Stand Transfers) walker, front-wheeled  -RM       Row Name 02/14/24 1029          Gait/Stairs (Locomotion)    Sawyer Level (Gait) contact guard;verbal cues;nonverbal cues (demo/gesture)  -RM     Assistive Device (Gait) walker, front-wheeled  -RM     Patient was able to Ambulate yes  -RM     Distance in Feet (Gait) 60'  -RM     Deviations/Abnormal Patterns (Gait) antalgic;base of support, wide  -RM     Right Sided Gait Deviations lateral trunk flexion;forward flexed posture;weight shift ability decreased  -RM               User Key  (r) = Recorded By, (t) = Taken By, (c) = Cosigned By      Initials Name Provider Type    David Todd PTA Physical Therapist Assistant                   Obj/Interventions       Row Name 02/14/24 1031          Motor Skills    Therapeutic Exercise hip;knee;ankle  -RM       Row Name 02/14/24  1031          Hip (Therapeutic Exercise)    Hip (Therapeutic Exercise) isometric exercises;strengthening exercise  -RM     Hip Isometrics (Therapeutic Exercise) bilateral;10 repetitions;gluteal sets  -RM     Hip Strengthening (Therapeutic Exercise) right;heel slides;10 repetitions  -RM       Row Name 02/14/24 1031          Knee (Therapeutic Exercise)    Knee (Therapeutic Exercise) isometric exercises  -RM     Knee Isometrics (Therapeutic Exercise) right;quad sets;10 repetitions  -RM       Row Name 02/14/24 1031          Ankle (Therapeutic Exercise)    Ankle (Therapeutic Exercise) AROM (active range of motion)  -RM     Ankle AROM (Therapeutic Exercise) bilateral;dorsiflexion;plantarflexion;10 repetitions  -RM               User Key  (r) = Recorded By, (t) = Taken By, (c) = Cosigned By      Initials Name Provider Type    RM David Winkler, PTA Physical Therapist Assistant                   Goals/Plan    No documentation.                  Clinical Impression       Row Name 02/14/24 1031          Pain    Pretreatment Pain Rating 0/10 - no pain  -RM     Posttreatment Pain Rating 2/10  -RM     Pain Location - Side/Orientation Right  -RM     Pain Location - hip  -RM     Pain Intervention(s) Repositioned;Ambulation/increased activity  -RM       Row Name 02/14/24 1031          Plan of Care Review    Plan of Care Reviewed With patient;spouse  -     Progress improving  -RM     Outcome Evaluation Pt sitting [facility] and willing to participate with treatment. Pt prior to gait training was instructed in proper technique in turning toward R side as well as performed. Pt was also educated with proper sequencing during gait and use of walker and also performed . Pt was able to advance gait distance to 60' cga with rw. Spouse was eduated in proper use of gait belt when assisting pt with mobility.  THP were reviewed with pt and pt reported understanding.  Pt was given copy of HEP as well as copy placed in chart. Pt performed HEP and  proper techniques reinforced.  Pt and spouse reported understanding. See flowsheet for details. Cont PT per POC progressing to goals as pt tolerates.  -RM       Row Name 02/14/24 1031          Positioning and Restraints    Pre-Treatment Position sitting in chair/recliner  -RM     Post Treatment Position chair  -RM     In Chair sitting;call light within reach;encouraged to call for assist;with family/caregiver;notified nsg  -RM               User Key  (r) = Recorded By, (t) = Taken By, (c) = Cosigned By      Initials Name Provider Type    David Todd, JORDAN Physical Therapist Assistant                   Outcome Measures       Row Name 02/14/24 1036 02/14/24 0800       How much help from another person do you currently need...    Turning from your back to your side while in flat bed without using bedrails? 3  -RM 3  -SF    Moving from lying on back to sitting on the side of a flat bed without bedrails? 3  -RM 3  -SF    Moving to and from a bed to a chair (including a wheelchair)? 3  -RM 3  -SF    Standing up from a chair using your arms (e.g., wheelchair, bedside chair)? 3  -RM 3  -SF    Climbing 3-5 steps with a railing? 2  -RM 2  -SF    To walk in hospital room? 3  -RM 3  -SF    AM-PAC 6 Clicks Score (PT) 17  -RM 17  -SF    Highest Level of Mobility Goal 5 --> Static standing  -RM 5 --> Static standing  -SF      Row Name 02/14/24 1036          Functional Assessment    Outcome Measure Options AM-PAC 6 Clicks Basic Mobility (PT)  -RM               User Key  (r) = Recorded By, (t) = Taken By, (c) = Cosigned By      Initials Name Provider Type    David Todd, PTA Physical Therapist Assistant    Deja Carpenter, RN Registered Nurse                                 Physical Therapy Education       Title: PT OT SLP Therapies (Resolved)       Topic: Physical Therapy (Resolved)       Point: Mobility training (Resolved)       Learning Progress Summary             Patient Acceptance, E,TB, NR by  at  2/13/2024 1902    Comment: Posture to assist with knee extension                         Point: Home exercise program (Resolved)       Learner Progress:  Not documented in this visit.              Point: Body mechanics (Resolved)       Learner Progress:  Not documented in this visit.              Point: Precautions (Resolved)       Learner Progress:  Not documented in this visit.                              User Key       Initials Effective Dates Name Provider Type Discipline     08/22/23 -  Steph Alejandre, PT Physical Therapist PT                  PT Recommendation and Plan     Plan of Care Reviewed With: patient, spouse  Progress: improving  Outcome Evaluation: Pt sitting [facility] and willing to participate with treatment. Pt prior to gait training was instructed in proper technique in turning toward R side as well as performed. Pt was also educated with proper sequencing during gait and use of walker and also performed . Pt was able to advance gait distance to 60' cga with rw. Spouse was eduated in proper use of gait belt when assisting pt with mobility.  THP were reviewed with pt and pt reported understanding.  Pt was given copy of HEP as well as copy placed in chart. Pt performed HEP and proper techniques reinforced.  Pt and spouse reported understanding. See flowsheet for details. Cont PT per POC progressing to goals as pt tolerates.     Time Calculation:         PT Charges       Row Name 02/14/24 1039             Time Calculation    Start Time 0941  -RM      Stop Time 1021  -RM      Time Calculation (min) 40 min  -RM      PT Received On 02/14/24  -RM      PT Goal Re-Cert Due Date 02/23/24  -RM         Time Calculation- PT    Total Timed Code Minutes- PT 40 minute(s)  -RM         Timed Charges    00465 - PT Therapeutic Exercise Minutes 23  -RM      07393 - Gait Training Minutes  17  -RM         Total Minutes    Timed Charges Total Minutes 40  -RM       Total Minutes 40  -RM                User Key  (r) = Recorded  By, (t) = Taken By, (c) = Cosigned By      Initials Name Provider Type     David Winkler, PTA Physical Therapist Assistant                  Therapy Charges for Today       Code Description Service Date Service Provider Modifiers Qty    55785032348 HC PT THER PROC EA 15 MIN 2/14/2024 David Winkler, PTA GP 2    17558663827 HC GAIT TRAINING EA 15 MIN 2/14/2024 David Winkler, PTA GP 1            PT G-Codes  Outcome Measure Options: AM-PAC 6 Clicks Basic Mobility (PT)  AM-PAC 6 Clicks Score (PT): 17       David Winkler PTA  2/14/2024

## 2024-02-14 NOTE — CASE MANAGEMENT/SOCIAL WORK
Case Management Discharge Note                Selected Continued Care - Discharged on 2/14/2024 Admission date: 2/13/2024 - Discharge disposition: Home-Health Care Svc      Destination    No services have been selected for the patient.                Durable Medical Equipment Coordination complete.      Service Provider Selected Services Address Phone Fax Patient Preferred    [facility] [employer] [address] [address] [phone] --       Internal Comment last updated by Tana Griffin, RN 2/14/2024 1032    [facility] [place], order for walker- pt states he has a walker at home                         Dialysis/Infusion    No services have been selected for the patient.                Home Medical Care Coordination complete.      Service Provider Selected Services Address Phone Fax Patient Preferred     [facility] ,  Home Rehabilitation ,  Home Health Services [address] [facility] [id number] [phone] --              Therapy    No services have been selected for the patient.                Community Resources    No services have been selected for the patient.                Community & DME    No services have been selected for the patient.                    Transportation Services  Private: Car    Final Discharge Disposition Code: 06 - home with home health care

## 2024-02-14 NOTE — PROGRESS NOTES
Patient: Senate R Timi  Procedure(s):  Right total hip arthroplasty (BIOMET)  Anesthesia type: spinal    Patient location: [facility] Surgical Floor  Last vitals:   Vitals:    02/14/24 0714   BP: 139/75   Pulse:    Resp: 19   Temp: 96.9 °F (36.1 °C)   SpO2:      Level of consciousness: awake, alert, and oriented    Post-anesthesia pain: adequate analgesia  Airway patency: patent  Respiratory: unassisted  Cardiovascular: stable and blood pressure at baseline  Hydration: euvolemic    Anesthetic complications: no

## 2024-02-14 NOTE — DISCHARGE SUMMARY
"  Name:  Yehuda Capellan   Age:  68 y.o.  Sex:  male  :  1955  MRN:  [id number]   Visit Number:  [id number]  Primary Care Physician:  Cindy Zhang MD  Date of Discharge:  2024  Admission Date:  2024  6:06 AM       Discharge Diagnosis:       Patient Active Problem List   Diagnosis    Atrial fibrillation, chronic    Dyslipidemia    Essential hypertension    Type 2 diabetes mellitus with hyperglycemia, without long-term current use of insulin    Multinodular goiter (nontoxic)    Arthralgia of right hip    Primary osteoarthritis of right hip    Microalbuminuria due to type 2 diabetes mellitus    Screening for colon cancer    OA (osteoarthritis) of hip    Status post total hip replacement, right    S/P total right hip arthroplasty       Presenting Problem/History of Present Illness:    Primary osteoarthritis of right hip [M16.11]  OA (osteoarthritis) of hip [M16.9]  S/P total right hip arthroplasty [Z96.641]         Consults:     Consults       No orders found for last 30 day(s).            Procedures Performed:    Procedure(s):  Right total hip arthroplasty (BIOMET)         History of presenting illness:    Patient was admitted yesterday to hospital s/p right GINNY because he was having difficulty ambulating with PT. He mentioned at that time, he was having numbness to RLE when he tried to bear weight.     This morning, he mentions he is \"doing 100%\" better\"  No CP, denies SOA. He even endorses that he was able to ambulate this am from the recliner to the door without complication.  He states he is \"ready to go home\"       Vital Signs:    Temp:  [96.9 °F (36.1 °C)-98.2 °F (36.8 °C)] 96.9 °F (36.1 °C)  Heart Rate:  [53-96] 93  Resp:  [14-22] 19  BP: ()/(45-91) 139/75    Physical Exam:    General Appearance alert, appears stated age, and cooperative  Head normocephalic, without obvious abnormality and atraumatic  Eyes lids and lashes normal  Ears ears appear intact with no abnormalities " "noted  Nose no drainage  Throat oral mucosa moist  Lungs respirations regular, respirations even, and respirations unlabored  Abdomen non distended  Extremities moves extremities well, no redness, and Jenny's sign negative  Pulses Pulses palpable and equal bilaterally  Skin color normal  Neurologic Mental Status orientated to person, place, time and situation      Pertinent Lab Results:     Lab Results (all)       Procedure Component Value Units Date/Time    TISSUE EXAM, [facility]) [[id number]] Collected: 24    Specimen: Bone from Hip, Right Updated: 24     Reference Lab Report --     [facility]  [address] [place] [address]  Phone: [phone] or [phone]  Fax: [phone]  Pavel Herrera M.D., Medical Director    PATIENT NAME                           LABORATORY NO.  [id number]  DARRYL, SENATE R.                     [id number]  [id number]                         AGE              SEX  [facility]           CLIENT REF #  [facility]            68      1955      [id number]   [id number]    [address] [place] [address] BY-PASS                REQUESTING M.D.     ATTENDING M.D.     COPY TO.   [address]                        [place] [address]                 DATE COLLECTED      DATE RECEIVED      DATE REPORTED  2024    DIAGNOSIS:  FEMORAL HEAD, GROSS ONLY, RIGHT:  GROSS DIAGNOSIS:  Changes consistent with osteoarthritis    RLL/sm    CLINICAL HISTORY:  Primary osteoarthritis of right hip    SPECIMENS RECEIVED:  FEMORAL HEAD, GROSS ONLY, RIGHT    Professional interpretation rendered by Pavel Herrera M.D., F.C.A.P. at  [facility] [employer], [address] [place] [address].    GROSS DESCRIPTION:  Received in formalin labeled \"femoral head\" is a 5.1 x 5.0 x 5.0 cm femoral head  with an attached 0.6 cm in length by 3.9 cm in diameter femoral neck.  The  external surface " is tan-white-pink and smooth to granular with focal areas of  eburnation that measure up to 3.3 cm in greatest dimension.  The cut surfaces are  tan-yellow to red and unremarkable with a cartilage thickness that ranges from  less than 0.1 cm to 0.4 cm.  A 4.9 x 3.9 x 0.8 cm portion of tan-yellow,  lobulated, unremarkable fatty tissue is also received.  The specimen is for gross  examination only, and no tissue is submitted.  AKG    REVIEWED, DIAGNOSED AND ELECTRONICALLY  SIGNED BY:    Pavel Herrera M.D., F.C.A.P.  CPT CODES:  79567      POC Glucose Once [[id number]]  (Abnormal) Collected: 02/14/24 0746    Specimen: Blood Updated: 02/14/24 0751     Glucose 201 mg/dL      Comment: Serial Number: [id number]:  [id number]       POC Glucose Once [[id number]]  (Abnormal) Collected: 02/13/24 1349    Specimen: Blood Updated: 02/13/24 1351     Glucose 142 mg/dL      Comment: Serial Number: [id number]:  [id number]       POC Glucose Once [[id number]]  (Abnormal) Collected: 02/13/24 0623    Specimen: Blood Updated: 02/13/24 0629     Glucose 170 mg/dL      Comment: Serial Number: [id number]:  [id number]               Pertinent Radiology Results:    Imaging Results (All)       Procedure Component Value Units Date/Time    XR Pelvis 1 or 2 View [[id number]] Collected: 02/13/24 1230     Updated: 02/13/24 1239    Narrative:      PROCEDURE: XR PELVIS 1 OR 2 VW-     COMPARISON: 01/30/2024     1 VIEW     HISTORY: post-op R THR; Z96.641-Presence of right artificial hip joint;  M16.11-Unilateral primary osteoarthritis, right hip; O97-Vtglpzkouxxexh  in diseases classified elsewhere     FINDINGS:  One view shows no evidence of an acute, displaced fracture or  dislocation of the visualized bony architecture. There has been interval  right hip arthroplasty. Hardware is unremarkable. Severe degenerative  changes of the left hip are noted.       Impression:      Postoperative changes as above.              This report was signed  and finalized on 2/13/2024 12:36 PM by Bharat Cobb MD.               Condition on Discharge:      stable    Code status during the hospital stay:    <no information>    Discharge Disposition:    Home-Health Care [facility]    Discharge Medication:       Discharge Medications        New Medications        Instructions Start Date   docusate sodium 100 MG capsule  Commonly known as: Colace   100 mg, Oral, 2 Times Daily PRN      HYDROcodone-acetaminophen 7.5-325 MG per tablet  Commonly known as: NORCO   Take 1 tablet by mouth Every 6 (Six) Hours As Needed For Pain      ondansetron 4 MG tablet  Commonly known as: Zofran   4 mg, Oral, Every 12 Hours PRN             Continue These Medications        Instructions Start Date   Accu-Chek Darcy Plus test strip  Generic drug: glucose blood   CHECK GLUCOSE TWO TIMES A  DAY FOR DIABETES MELLITUS      Accu-Chek Darcy solution   Use as directed. E11.9      apixaban 5 MG tablet tablet  Commonly known as: Eliquis   5 mg, Oral, Every 12 Hours Scheduled      atorvastatin 20 MG tablet  Commonly known as: LIPITOR   20 mg, Oral, Nightly, To lower cholesterol and risk of stroke.      cetirizine 10 MG tablet  Commonly known as: zyrTEC   10 mg, Oral, Daily      empagliflozin 10 MG tablet tablet  Commonly known as: Jardiance   10 mg, Oral, Daily      empagliflozin 10 MG tablet tablet  Commonly known as: Jardiance   10 mg, Oral, Daily, For diabetes mellitus.      gabapentin 300 MG capsule  Commonly known as: NEURONTIN   300 mg, Oral, 4 Times Daily PRN      glimepiride 2 MG tablet  Commonly known as: AMARYL   2 mg, Oral, Every Morning Before Breakfast      glucose monitor monitoring kit   ACCU-CHECK DARCY PLUS METER as requested.      Lancets Misc. misc   TEST TWICE A DAY FOR DM. E11.9. patient requests acc-check softclick      losartan-hydrochlorothiazide 100-12.5 MG per tablet  Commonly known as: HYZAAR   1 tablet, Oral, Daily      meloxicam 7.5 MG tablet  Commonly known as: Mobic   7.5 mg, Oral,  Daily      metoprolol tartrate 50 MG tablet  Commonly known as: LOPRESSOR   TAKE 1 TABLET TWICE A DAY      multivitamin with minerals tablet tablet   1 tablet, Oral, Daily      tamsulosin 0.4 MG capsule 24 hr capsule  Commonly known as: FLOMAX   0.4 mg, Oral, Daily      traZODone 50 MG tablet  Commonly known as: DESYREL   TAKE 1 TABLET EVERY NIGHT      Vitamin B-12 1000 MCG sublingual tablet   Sublingual, Daily             Stop These Medications      bisacodyl 5 MG EC tablet  Commonly known as: Dulcolax     fluorouracil 5 % cream  Commonly known as: EFUDEX     lidocaine 5 %  Commonly known as: LIDODERM     polyethylene glycol 17 g packet  Commonly known as: MiraLax     triamcinolone 0.1 % cream  Commonly known as: KENALOG              Discharge Diet:     Diet Instructions       Diet: Regular/House Diet; Pudding Thick      Discharge Diet: Regular/House Diet    Fluid Consistency: Pudding Thick        as tolerated.    Activity at Discharge:     Activity Instructions       Change Dressing      Change dressing once daily starting fifth post-operative day, and keep a clean dry dressing in place.    Change Dressing      Change dressing once daily starting 5th post-operative day, and keep a clean dry dressing in place that you may change every other day.    Patient May Shower; No Tub Baths, Pools or Hot Tubs      May change dressing routinely, keep a clean dry dressing in place.    Patient May Shower; No Tub Baths, Pools or Hot Tubs      May change initial post op dressing on post op day # 5. Then apply a new dressing and change this every other day.    Weight Bearing As Tolerated      Protected weight bearing as tolerated, with cane, crutches or walker as appropriate for condition and safety.    Weight Bearing As Tolerated      Protected weight bearing as tolerated, with cane, crutches or walker as appropriate for condition and safety.            Follow-up Appointments:    Future Appointments   Date Time Provider  Department Center   2/20/2024  1:20 PM Margarita Molina MD [facility]   2/23/2024  2:00 PM Cindy Zhang MD [facility] PC [facility]   2/27/2024  1:30 PM Oumar Wilson PA-C [facility]S [facility]   3/5/2024  1:00 PM Cindy Zhang MD [facility]   6/17/2024  9:00 AM Cruzito Grajeda MD [facility] CD BG [facility]     Additional Instructions for the Follow-ups that You Need to Schedule       Ambulatory Referral to Home Health (Hospital)   As directed      Face to Face Visit Date: 2/13/2024   Follow-up provider for Plan of Care?: I will be treating the patient on an ongoing basis.  Please send me the Plan of Care for signature.   Follow-up provider: ANDREA GAMINO [[id number]]   Reason/Clinical Findings: post-op R THR   Describe mobility limitations that make leaving home difficult: post-op R THR   Nursing/Therapeutic Services Requested: Physical Therapy Occupational Therapy   PT orders: Total joint pathway Gait Training Transfer training Home safety assessment Strengthening Therapeutic exercise   Weight Bearing Status: As Tolerated   Occupational orders: Activities of daily living Strengthening Home safety assessment   Frequency: 1 Week 1        Apply Ice to Affected Extremity Every 2 Hours   As directed      Only 2 hours maximum at a time, only three to four times every 24 hours.    Order Comments: Only 2 hours maximum at a time, only three to four times every 24 hours.         Apply Ice to Affected Extremity Every 2 Hours   As directed      Only 2 hours maximum at a time, only three to four times every 24 hours.    Order Comments: Only 2 hours maximum at a time, only three to four times every 24 hours.         Discharge Follow-up with PCP   As directed       Currently Documented PCP:    Cindy Zhang MD    PCP Phone Number:    [phone]     Follow Up Details: with patient primary care physician in 1 -3 months as appropriate.        Discharge Follow-up with PCP   As directed       Currently Documented  PCP:    Cindy Zhang MD    PCP Phone Number:    [phone]     Follow Up Details: with patient primary care physician in 1 -3 months as appropriate.        Discharge Follow-up with Specified Provider: Loyd Dickens MD   As directed      To: Loyd Dickens MD   Follow Up Details: at scheduled appointment - check on [facility] patient appointments for date and time.        Discharge Follow-up with Specified Provider: Loyd Dickens MD   As directed      To: Loyd Dickens MD   Follow Up Details: at scheduled appointment - check on [facility] patient appointments for date and time.        Elevate Affected Extremity At or Above Level of Heart   As directed      Keep above level of heart at periodic intervals to reduce swelling.    Order Comments: Keep above level of heart at periodic intervals to reduce swelling.         Elevate Affected Extremity At or Above Level of Heart   As directed      Keep above level of heart at periodic intervals to reduce swelling.    Order Comments: Keep above level of heart at periodic intervals to reduce swelling.                 Test Results Pending at Discharge:          The patient is physically incapable of utilizing regular toilet facilities. Use of a bedside commode is necessary as they are confined to one level of home  with no toileting facilities available on that level.     Patient has a mobility limitation that significantly impairs their ability to participate in one or more mobility- related activities of daily living. The patient is able to safely use the walker. The functional mobility deficit can be sufficiently resolved by use of a walker.       Oumar Wilson PA-C  02/14/24  09:49 EST    Time: Discharge 15 min

## 2024-02-14 NOTE — PLAN OF CARE
Goal Outcome Evaluation:  Plan of Care Reviewed With: patient        Progress: no change  Outcome Evaluation: VSS no acute events noted overnight. Patient up to BSC as tolerated. Plan of care to be continued. Possible D/C today with HH.

## 2024-02-14 NOTE — THERAPY DISCHARGE NOTE
"Acute Care - Occupational Therapy Discharge  [facility]    Patient Name: Yehuda Capellan  : 1955    MRN: [id number]                              Today's Date: 2024       Admit Date: 2024    Visit Dx:     ICD-10-CM ICD-9-CM   1. Status post total hip replacement, right  Z96.641 V43.64   2. Primary osteoarthritis of right hip  M16.11 715.15   3. Neuropathy due to medical condition  G63 357.4   4. S/P total right hip arthroplasty  Z96.641 V43.64     Patient Active Problem List   Diagnosis    Atrial fibrillation, chronic    Dyslipidemia    Essential hypertension    Type 2 diabetes mellitus with hyperglycemia, without long-term current use of insulin    Multinodular goiter (nontoxic)    Arthralgia of right hip    Primary osteoarthritis of right hip    Microalbuminuria due to type 2 diabetes mellitus    Screening for colon cancer    OA (osteoarthritis) of hip    Status post total hip replacement, right    S/P total right hip arthroplasty     Past Medical History:   Diagnosis Date    Allergic     Arthritis     Atrial fibrillation     \"permanent\" per Dr. Grajeda note on 23; on Eliquis    Diabetes mellitus     Elevated cholesterol     Elevated glucose 2017    A1C 6.7    Hip fracture     right, at age 17    Hyperlipidemia     Hypertension     Impaired functional mobility, balance, gait, and endurance     Microalbuminuria due to type 2 diabetes mellitus 2023    Obesity     Recurrent UTI     seen by Dr. Molina      Past Surgical History:   Procedure Laterality Date    COLONOSCOPY      HAND SURGERY Left     LEFT PINKY SEWN BACK ON in [place] , cut off with table saw    TOTAL HIP ARTHROPLASTY Right 2024    Procedure: Right total hip arthroplasty (BIOMET);  Surgeon: Modesto Dickens MD;  Location: [facility];  Service: Orthopedics;  Laterality: Right;      General Information       Row Name 24 1212          OT Time and Intention    Document Type discharge evaluation/summary  " -SD     Mode of Treatment occupational therapy  -SD       Row Name 02/14/24 1212          General Information    Patient Profile Reviewed yes  -SD     Prior Level of Function independent:;all household mobility;community mobility;ADL's  Lives with wife, has a RW, SPC, walk in shower, BSC, reacher, long handled sponge and hospital bed. Needs a sock aid and shower chair  -SD     Existing Precautions/Restrictions fall;hip, posterior;right  -SD     Barriers to Rehab medically complex  -SD       Row Name 02/14/24 1212          Living Environment    People in Home spouse  -SD       Row Name 02/14/24 1212          Home Main Entrance    Number of Stairs, Main Entrance none  -SD       Row Name 02/14/24 1212          Stairs Within Home, Primary    Stairs, Within Home, Primary ramp entry, one level  -SD       Row Name 02/14/24 1212          Cognition    Orientation Status (Cognition) oriented x 4  -SD       Row Name 02/14/24 1212          Safety Issues, Functional Mobility    Safety Issues Affecting Function (Mobility) safety precautions follow-through/compliance;safety precaution awareness;awareness of need for assistance;insight into deficits/self-awareness;at risk behavior observed;judgment  -SD     Impairments Affecting Function (Mobility) strength;pain  -SD               User Key  (r) = Recorded By, (t) = Taken By, (c) = Cosigned By      Initials Name Provider Type    SD Patricia Chi OT Occupational Therapist                   Mobility/ADL's       Row Name 02/14/24 1214          Bed Mobility    Comment, (Bed Mobility) in chair, discussed getting in and out of bed to maintain GINNY precautions  -SD       Row Name 02/14/24 1214          Transfers    Transfers sit-stand transfer  -SD       Row Name 02/14/24 1214          Sit-Stand Transfer    Sit-Stand [place] (Transfers) standby assist  -SD     Assistive Device (Sit-Stand Transfers) walker, front-wheeled  -SD     Comment, (Sit-Stand Transfer) VCs for safety  awareness, and to maintain GINNY prec  -SD       Row Name 02/14/24 1214          Functional Mobility    Functional Mobility- Ind. Level standby assist  -SD     Functional Mobility- Device walker, front-wheeled  -SD     Functional Mobility-Distance (Feet) 8  -SD       Row Name 02/14/24 1214          Activities of Daily Living    BADL Assessment/Intervention bathing;upper body dressing;lower body dressing;grooming;feeding;toileting  -SD       Row Name 02/14/24 1214          Bathing Assessment/Intervention    [place] Level (Bathing) contact guard assist;minimum assist (75% patient effort)  -SD     Comment, (Bathing) needs a shower chair, already has long handled sponge. Recommend wife assists for safety  -SD       Row Name 02/14/24 1214          Upper Body Dressing Assessment/Training    [place] Level (Upper Body Dressing) set up  -SD       [facility] Name 02/14/24 1214          Lower Body Dressing Assessment/Training    [place] Level (Lower Body Dressing) don;pants/bottoms;shoes/slippers;socks;standby assist;contact guard assist  -SD     Comment, (Lower Body Dressing) using AE; needs a sock aid  -SD       [facility] Name 02/14/24 1214          Grooming Assessment/Training    [place] Level (Grooming) set up  -SD       [facility] Name 02/14/24 1214          Self-Feeding Assessment/Training    [place] Level (Feeding) set up  -SD       [facility] Name 02/14/24 1214          Toileting Assessment/Training    [place] Level (Toileting) standby assist  -SD     Assistive Devices (Toileting) urinal  -SD     Position (Toileting) supported standing  -SD               User Key  (r) = Recorded By, (t) = Taken By, (c) = Cosigned By      Initials Name Provider Type    SD Patricia Chi OT Occupational Therapist                   Obj/Interventions       Row Name 02/14/24 1217          Range of Motion Comprehensive    General Range of Motion bilateral upper extremity ROM WFL  -SD       Row Name 02/14/24 1217          Strength  Comprehensive (MMT)    General Manual Muscle Testing (MMT) Assessment upper extremity strength deficits identified  -SD     Comment, General Manual Muscle Testing (MMT) Assessment patient given UB HEP and green theraband  -SD               User Key  (r) = Recorded By, (t) = Taken By, (c) = Cosigned By      Initials Name Provider Type    Patricia Nova OT Occupational Therapist                   Goals/Plan    No documentation.                  Clinical Impression       Row Name 02/14/24 1217          Pain Assessment    Pretreatment Pain Rating 2/10  -SD     Posttreatment Pain Rating 5/10  -SD     Pain Location - Side/Orientation Right  -SD     Pain Location lower  -SD     Pain Location - extremity  -SD     Pain Intervention(s) Repositioned;Ambulation/increased activity  -SD       Row Name 02/14/24 1217          Plan of Care Review    Plan of Care Reviewed With patient;spouse  -SD     Progress improving  -SD     Outcome Evaluation OT eval completed. Patient is sitting in chair, wife is present. Patient is Ox4, reports 2/10 R hip pain at rest. Discussed GINNY precautions with patient with him only able to recall 1/3 GINNY precautions. Reiterated importance of maintaining precautions to prevent dislocation. Patient performed sit to stand with SBA, able to perform toileting task with SBA using urinal. Patient assisted in getting dressed using AE. Patient has a reacher and long handled sponge at home, would benefit from a sock aid, but reports his wife will do that for him. Patient has a RW, SPC, BSC and walk in shower; discussed with wife he will need a shower chair for safety during bathing tasks, as well as her assisting initially. Patient requires cues for safety and adherence to GINNY precautions. Patient is being DC'd home today with wife assisting and HH services, would benefit from a sock aid and shower chair.  -SD       Row Name 02/14/24 1217          Therapy Assessment/Plan (OT)    Patient/Family Therapy Goal  Statement (OT) home with HH  -SD     Rehab Potential (OT) good, to achieve stated therapy goals  -SD     Criteria for Skilled Therapeutic Interventions Met (OT) skilled treatment is necessary  -SD     Therapy Frequency (OT) evaluation only  -SD     Predicted Duration of Therapy Intervention (OT) continue rehab at home  -SD       Row Name 02/14/24 1217          Therapy Plan Review/Discharge Plan (OT)    Equipment Needs Upon Discharge (OT) shower chair;other (see comments)  sock aid  -SD     Anticipated Discharge Disposition (OT) home with assist;home with home health;home with 24/7 care  -SD       Row Name 02/14/24 1217          Vital Signs    O2 Delivery Pre Treatment room air  -SD     O2 Delivery Intra Treatment room air  -SD     O2 Delivery Post Treatment room air  -SD       Row Name 02/14/24 1217          Positioning and Restraints    Pre-Treatment Position sitting in chair/recliner  -SD     Post Treatment Position bed  -SD     In Bed sitting EOB;call light within reach;encouraged to call for assist;with nsg  -SD               User Key  (r) = Recorded By, (t) = Taken By, (c) = Cosigned By      Initials Name Provider Type    Patricia Nova, OT Occupational Therapist                   Outcome Measures       Row Name 02/14/24 1224          How much help from another is currently needed...    Putting on and taking off regular lower body clothing? 3  -SD     Bathing (including washing, rinsing, and drying) 3  -SD     Toileting (which includes using toilet bed pan or urinal) 3  -SD     Putting on and taking off regular upper body clothing 3  -SD     Taking care of personal grooming (such as brushing teeth) 3  -SD     Eating meals 4  -SD     AM-PAC 6 Clicks Score (OT) 19  -SD       Row Name 02/14/24 1036 02/14/24 0800       How much help from another person do you currently need...    Turning from your back to your side while in flat bed without using bedrails? 3  -RM 3  -SF    Moving from lying on back to sitting  on the side of a flat bed without bedrails? 3  -RM 3  -SF    Moving to and from a bed to a chair (including a wheelchair)? 3  -RM 3  -SF    Standing up from a chair using your arms (e.g., wheelchair, bedside chair)? 3  -RM 3  -SF    Climbing 3-5 steps with a railing? 2  -RM 2  -SF    To walk in hospital room? 3  -RM 3  -SF    AM-PAC 6 Clicks Score (PT) 17  -RM 17  -SF    Highest Level of Mobility Goal 5 --> Static standing  -RM 5 --> Static standing  -SF      Row Name 02/14/24 1224 02/14/24 1036       Functional Assessment    Outcome Measure Options AM-PAC 6 Clicks Daily Activity (OT)  -SD AM-PAC 6 Clicks Basic Mobility (PT)  -RM              User Key  (r) = Recorded By, (t) = Taken By, (c) = Cosigned By      Initials Name Provider Type    RM David Winkler, PTA Physical Therapist Assistant    Patricia Nova OT Occupational Therapist    Deja Carpenter, RN Registered Nurse                    OT Recommendation and Plan  Therapy Frequency (OT): evaluation only  Plan of Care Review  Plan of Care Reviewed With: patient, spouse  Progress: improving  Outcome Evaluation: OT eval completed. Patient is sitting in chair, wife is present. Patient is Ox4, reports 2/10 R hip pain at rest. Discussed GINNY precautions with patient with him only able to recall 1/3 GINNY precautions. Reiterated importance of maintaining precautions to prevent dislocation. Patient performed sit to stand with SBA, able to perform toileting task with SBA using urinal. Patient assisted in getting dressed using AE. Patient has a reacher and long handled sponge at home, would benefit from a sock aid, but reports his wife will do that for him. Patient has a RW, SPC, BSC and walk in shower; discussed with wife he will need a shower chair for safety during bathing tasks, as well as her assisting initially. Patient requires cues for safety and adherence to GINNY precautions. Patient is being DC'd home today with wife assisting and HH services, would  benefit from a sock aid and shower chair.  Plan of Care Reviewed With: patient, spouse  Outcome Evaluation: OT eval completed. Patient is sitting in chair, wife is present. Patient is Ox4, reports 2/10 R hip pain at rest. Discussed GINNY precautions with patient with him only able to recall 1/3 GINNY precautions. Reiterated importance of maintaining precautions to prevent dislocation. Patient performed sit to stand with SBA, able to perform toileting task with SBA using urinal. Patient assisted in getting dressed using AE. Patient has a reacher and long handled sponge at home, would benefit from a sock aid, but reports his wife will do that for him. Patient has a RW, SPC, BSC and walk in shower; discussed with wife he will need a shower chair for safety during bathing tasks, as well as her assisting initially. Patient requires cues for safety and adherence to GINNY precautions. Patient is being DC'd home today with wife assisting and HH services, would benefit from a sock aid and shower chair.     Time Calculation:   Evaluation Complexity (OT)  Review Occupational Profile/Medical/Therapy History Complexity: brief/low complexity  Assessment, Occupational Performance/Identification of Deficit Complexity: 1-3 performance deficits  Clinical Decision Making Complexity (OT): problem focused assessment/low complexity  Overall Complexity of Evaluation (OT): low complexity     Time Calculation- OT       Row Name 02/14/24 1225 02/14/24 1039          Time Calculation- OT    OT Start Time 1029  -SD --     OT Received On 02/14/24  -SD --        Timed Charges    32789 - Gait Training Minutes  -- 17  -RM        Untimed Charges    OT Eval/Re-eval Minutes 45  -SD --        Total Minutes    Timed Charges Total Minutes -- 17  -RM     Untimed Charges Total Minutes 45  -SD --      Total Minutes 45  -SD 17  -RM               User Key  (r) = Recorded By, (t) = Taken By, (c) = Cosigned By      Initials Name Provider Type    David Todd,  PTA Physical Therapist Assistant    Patricia Nova OT Occupational Therapist                  Therapy Charges for Today       Code Description Service Date Service Provider Modifiers Qty    [id number] HC OT EVAL LOW COMPLEXITY 3 2/14/2024 Patricia Chi OT GO 1               OT Discharge Summary  Anticipated Discharge Disposition (OT): home with assist, home with home health, home with 24/7 care    Patricia Chi OT  2/14/2024

## 2024-02-14 NOTE — PAYOR COMM NOTE
"To:  [place]  From: Elizabeth Barger RN  Phone: [phone]  Fax: [phone]  NPI: [id number]  TIN: [id number]  Member ID: [id number]   MRN: [id number]    Xiao Capellan R \"Ray\" (68 y.o. Male)       Date of Birth   1955    Social Security Number       Address   [address] [place], [place] [address]    Home Phone   [phone]    MRN   [id number]       Mu-ism   None    Marital Status                               Admission Date   2/13/24    Admission Type   Elective    Admitting Provider   Modesto Dickens MD    Attending Provider   Modesto Dickens MD    [facility]       Discharge Date       Discharge Disposition       Discharge Destination                                 Attending Provider: Modesto Dickens MD    Allergies: Metformin, Sulfa Antibiotics    Isolation: None   Infection: None   Code Status: CPR    Ht: 177.8 cm (70\")   Wt: 95.3 kg (210 lb 1.6 oz)    Admission Cmt: None   Principal Problem: Primary osteoarthritis of right hip [M16.11]                   Active Insurance as of 2/13/2024       Primary Coverage       Payor Plan Insurance Group Employer/Plan Group    ANTHEM MEDICARE REPLACEMENT ANTHEM MEDICARE ADVANTAGE [id number]       Payor Plan Address Payor Plan Phone Number Payor Plan Fax Number Effective Dates    [address] [phone]  1/1/2022 - None Entered    [facility] [id number]         Subscriber Name Subscriber Birth Date Member ID       XIAO CAPELLAN MARILYNN 1955 [id number]                     Emergency Contacts        (Rel.) Home Phone Work Phone Mobile Phone    Sydnee Capellan (Spouse) [phone] -- --                 History & Physical        Modesto Dickens MD at 02/13/24 0742          H&P reviewed. The patient was examined and there are no changes to the H&P.    Vitals:    02/13/24 0632   BP: 117/89   Pulse: 68   Resp: 16   Temp: 97.1 °F (36.2 °C)   SpO2: 96%       Modesto DOYLE" MD Maninder  2/13/2024  07:42 EST     Electronically signed by Modesto Dickens MD at 02/13/24 0742   Source Note          Senjanae Capellan is a 68 y.o. right hand dominant male   Pre-op Exam of the Right Hip (Total hip arthroplasty scheduled for 2/13/24)          CHIEF COMPLAINT:    Pre-operative evaluation with history and physical exam    History of Present Illness      This is a chronic condition.    Patient presents for a planned right GINNY. He has been experiencing right hip pain due to OA for many years. He has tried FL guided hip injection which only provides temporary relief. He takes mobic for pain and uses a walker. The hip discomfort impedes on his ADL's      PAST MEDICAL HISTORY:    Past Medical History:   Diagnosis Date    Allergic     Arthritis     Atrial fibrillation     Diabetes mellitus     Elevated cholesterol     Elevated glucose 09/29/2017    A1C 6.7    Hip fracture     right, at age 17    Hyperlipidemia     Hypertension     Microalbuminuria due to type 2 diabetes mellitus 9/9/2023    Obesity        Current Outpatient Medications:     apixaban (Eliquis) 5 MG tablet tablet, Take 1 tablet by mouth Every 12 (Twelve) Hours., Disp: 180 tablet, Rfl: 2    atorvastatin (LIPITOR) 20 MG tablet, Take 1 tablet by mouth Every Night. To lower cholesterol and risk of stroke., Disp: 90 tablet, Rfl: 3    bisacodyl (Dulcolax) 5 MG EC tablet, Take 2 tablets at 3 pm and 2 tablets at 7 pm the day prior to colonoscopy, Disp: 4 tablet, Rfl: 0    Blood Glucose Calibration (Accu-Chek Darcy) solution, Use as directed. E11.9, Disp: 1 each, Rfl: 3    cetirizine (zyrTEC) 10 MG tablet, Take 1 tablet by mouth Daily., Disp: , Rfl:     Cyanocobalamin (Vitamin B-12) 1000 MCG sublingual tablet, Place  under the tongue Daily., Disp: , Rfl:     empagliflozin (Jardiance) 10 MG tablet tablet, Take 1 tablet by mouth Daily., Disp: 7 tablet, Rfl: 0    empagliflozin (Jardiance) 10 MG tablet tablet, Take 1 tablet by mouth Daily. For  diabetes mellitus., Disp: 90 tablet, Rfl: 3    fluorouracil (EFUDEX) 5 % cream, , Disp: , Rfl:     gabapentin (NEURONTIN) 300 MG capsule, Take 1 capsule by mouth 4 (Four) Times a Day As Needed (nerve pain)., Disp: 360 capsule, Rfl: 1    glimepiride (AMARYL) 2 MG tablet, TAKE 1 TABLET EVERY MORNINGBEFORE BREAKFAST, Disp: 90 tablet, Rfl: 3    glucose blood (Accu-Chek Darcy Plus) test strip, CHECK GLUCOSE TWO TIMES A  DAY FOR DIABETES MELLITUS, Disp: 200 each, Rfl: 3    glucose monitor monitoring kit, ACCU-CHECK DARCY PLUS METER as requested., Disp: 1 each, Rfl: 1    Lancets Misc. misc, TEST TWICE A DAY FOR DM. E11.9. patient requests acc-check softclick, Disp: 200 each, Rfl: 3    lidocaine (LIDODERM) 5 %, Place 1 patch on the skin as directed by provider Daily. Remove & Discard patch within 12 hours or as directed by MD, Disp: 30 patch, Rfl: 0    losartan-hydrochlorothiazide (HYZAAR) 100-12.5 MG per tablet, Take 1 tablet by mouth Daily. Indications: High Blood Pressure Disorder, Disp: 90 tablet, Rfl: 3    meloxicam (Mobic) 7.5 MG tablet, Take 1 tablet by mouth Daily., Disp: 14 tablet, Rfl: 0    metoprolol tartrate (LOPRESSOR) 50 MG tablet, TAKE 1 TABLET TWICE A DAY, Disp: 180 tablet, Rfl: 1    multivitamin with minerals tablet tablet, Take 1 tablet by mouth Daily., Disp: , Rfl:     polyethylene glycol (MiraLax) 17 g packet, Mix 238 gram powder with 64 oz of clear liquid starting at 5 pm. Drink 8 oz every 10-15 minutes until consumed., Disp: 238 each, Rfl: 0    tamsulosin (FLOMAX) 0.4 MG capsule 24 hr capsule, Take 1 capsule by mouth Daily for 360 days., Disp: 90 capsule, Rfl: 3    traZODone (DESYREL) 50 MG tablet, TAKE 1 TABLET EVERY NIGHT, Disp: 90 tablet, Rfl: 3    triamcinolone (KENALOG) 0.1 % cream, APPLY 1 APPLICATION        TOPICALLY TO THE           APPROPRIATE AREA AS        DIRECTED TWO TIMES A DAY, Disp: 80 g, Rfl: 3    Past Surgical History:   Procedure Laterality Date    COLONOSCOPY      HAND SURGERY Left   "   LEFT PINKY SEWN BACK ON in [place] , cut off with table saw     Family History   Problem Relation Age of Onset    Cancer Father      Social History     Socioeconomic History    Marital status:    Tobacco Use    Smoking status: Former     Packs/day: 2.00     Years: 15.00     Additional pack years: 0.00     Total pack years: 30.00     Types: Cigarettes     Quit date: 1991     Years since quittin.1     Passive exposure: Past    Smokeless tobacco: Never   Vaping Use    Vaping Use: Never used   Substance and Sexual Activity    Alcohol use: Never    Drug use: Never    Sexual activity: Not Currently     Partners: Female        Allergies   Allergen Reactions    Metformin Diarrhea    Sulfa Antibiotics Unknown - Low Severity     Patient states he had allergic reaction to sulfa drugs when he was a child.     Review of Systems   Musculoskeletal:  Positive for arthralgias (right hip).       I have reviewed the medical and surgical history, family history, social history, medications, and/or allergies, and the review of systems of this report.    PHYSICAL EXAMINATION:       /84 (BP Location: Left arm, Patient Position: Sitting, Cuff Size: Adult)   Ht 177.8 cm (70\")   Wt 95.3 kg (210 lb)   BMI 30.13 kg/m²     GENERAL [x] Well developed  []Ill appearing [x] No acute distress    HEENT [x]No acute changes [x] Normocephalic, atraumatic    NECK [x]Supple  [] No midline tenderness    LUNGS [x]Clear bilaterally [x]No wheezes []Rhonchi [] Rales    HEART [x] Regular rate  [x] Regular rhythm [] Irregular    ABDOMEN [x] Soft [x] Not tender [x] Not distended [x] Normal sounds    VAS/EXT [x] Normal Pulses []Edema []Cyanosis             SKIN [x] Warm [x]Dry []Pink []Ecchymosis []Cool    NEURO [x] Sensation Intact [x] Motor Intact [x] Pulse intact  [] Dysesthesias:_________  []Weakness:__________           Physical Exam  Vitals and nursing note reviewed.   Constitutional:       Appearance: Normal appearance. He is " not ill-appearing.   Pulmonary:      Effort: Pulmonary effort is normal.   Neurological:      Mental Status: He is alert and oriented to person, place, and time.       Right Hip Exam     Tenderness   The patient is experiencing tenderness in the anterior.    Range of Motion   Abduction:  10   Adduction:  15   Flexion:  70     Muscle Strength   Abduction: 4/5   Adduction: 4/5   Flexion: 4/5     Tests   RAYSHAWN: positive  Fadir:  Positive FADIR test          Extremity DVT signs are negative on physical exam with negative Jenny sign, no calf pain, no palpable cords, and no skin tone change   Neurologic Exam     Mental Status   Oriented to person, place, and time.           DVT Screening: No Yes   Smoker [x] []   Personal/Family History of DVT or PE [x] []   Sedentary Lifestyle [x] []   BMI >30 [x] []      Tranexamic acid TXA criteria for usage during arthroplasty surgery.    Previous or Active DVT/PE: NO  Cardiac Stent within 1 year: NO  Embolic/Ischemic stroke within 1 year: NO  GFR less than 30ml/min (advanced kidney disease): NO  TXA  tranexemic acid summary: THIS PATIENT IS A CANDIDATE FOR IV TXA DURING SURGERY.     Independent Review of Radiographic Studies:    X-ray of the right hip 1 view performed in the clinic for the evaluation of chronic right hip pain. ( He could not abduct the right hip due to pain) Comparison films are available and reviewed. There is severe right hip Degenerative changes c/w Tonnis stage 3          Diagnoses and all orders for this visit:    1. Arthralgia of right hip (Primary)  -     XR Hip With or Without Pelvis 2 - 3 View Right; Future    2. Primary osteoarthritis of right hip  -     XR Hip With or Without Pelvis 2 - 3 View Right; Future         *SPECIAL INSTRUCTIONS:  Multi-modal analgesia.  Tranexamic acid IV protocol (see screening parameters this report).  Multi-modal DVT prophylaxis.  Rehabilitation PT/OT protocol with same day walker ambulation with therapist.      Risks,  benefits, and alternative treatments discussed with the patient: [x] Yes [] No    Risk benefits and merits of the proposed surgery were discussed and the patient's questions were answered risks discussed including and not limited to:  Anesthesia reactions  Blood loss and possible transfusion  Infection  Deep venous thrombosis and pulmonary embolus  Nerve, vascular or tendon injury  Fracture  Deformity  Stiffness  Weakness  Prosthesis and implant issues such as loosening, material wear or dislocation  Skin necrosis  Revision surgery or further treatment  Recurrence of problem and condition     Informed consent: [] Signed  [x] To be obtained at hospital  [] Both        Recommendations/Plan:  Discussion of orthopaedic goals and activities and patient expressed appreciation.  Risk, benefits, and merits of treatment alternatives reviewed with the patient and questions answered  Regular exercise as tolerated  Guided on proper techniques for mobility and conditioning exercises  The nature of the proposed surgery reviewed with patient including risks, benefits, rehabilitation, recovery time, and outcome expectations      PLANNED SURGICAL PROCEDURE: Elective right total hip arthroplasty     From cardiology:    Polina Rendon APRN   Nurse Practitioner  Specialty: Cardiology     Telephone Encounter     Signed     Encounter Date: 8/21/2023     Signed         Patient can HOLD eliquis 3-5 days pre-procedure or per surgeon's preference.  Eliquis can be resumed post-op day 1 or when surgeon deems reasonable.            BMI is >= 30 and <35. (Class 1 Obesity). The following options were offered after discussion;: weight loss educational material (shared in after visit summary)    Patient is encouraged and agreeable to call or return sooner for any issues or concerns.      Electronically signed by Oumar Wilson PA-C at 01/30/24 0900                 Oumar Wilson PA-C at 01/30/24 0930          Yehuda Capellan  is a 68 y.o. right hand dominant male   Pre-op Exam of the Right Hip (Total hip arthroplasty scheduled for 2/13/24)          CHIEF COMPLAINT:    Pre-operative evaluation with history and physical exam    History of Present Illness      This is a chronic condition.    Patient presents for a planned right GINNY. He has been experiencing right hip pain due to OA for many years. He has tried FL guided hip injection which only provides temporary relief. He takes mobic for pain and uses a walker. The hip discomfort impedes on his ADL's      PAST MEDICAL HISTORY:    Past Medical History:   Diagnosis Date    Allergic     Arthritis     Atrial fibrillation     Diabetes mellitus     Elevated cholesterol     Elevated glucose 09/29/2017    A1C 6.7    Hip fracture     right, at age 17    Hyperlipidemia     Hypertension     Microalbuminuria due to type 2 diabetes mellitus 9/9/2023    Obesity          Current Outpatient Medications:     apixaban (Eliquis) 5 MG tablet tablet, Take 1 tablet by mouth Every 12 (Twelve) Hours., Disp: 180 tablet, Rfl: 2    atorvastatin (LIPITOR) 20 MG tablet, Take 1 tablet by mouth Every Night. To lower cholesterol and risk of stroke., Disp: 90 tablet, Rfl: 3    bisacodyl (Dulcolax) 5 MG EC tablet, Take 2 tablets at 3 pm and 2 tablets at 7 pm the day prior to colonoscopy, Disp: 4 tablet, Rfl: 0    Blood Glucose Calibration (Accu-Chek Darcy) solution, Use as directed. E11.9, Disp: 1 each, Rfl: 3    cetirizine (zyrTEC) 10 MG tablet, Take 1 tablet by mouth Daily., Disp: , Rfl:     Cyanocobalamin (Vitamin B-12) 1000 MCG sublingual tablet, Place  under the tongue Daily., Disp: , Rfl:     empagliflozin (Jardiance) 10 MG tablet tablet, Take 1 tablet by mouth Daily., Disp: 7 tablet, Rfl: 0    empagliflozin (Jardiance) 10 MG tablet tablet, Take 1 tablet by mouth Daily. For diabetes mellitus., Disp: 90 tablet, Rfl: 3    fluorouracil (EFUDEX) 5 % cream, , Disp: , Rfl:     gabapentin (NEURONTIN) 300 MG capsule, Take 1  capsule by mouth 4 (Four) Times a Day As Needed (nerve pain)., Disp: 360 capsule, Rfl: 1    glimepiride (AMARYL) 2 MG tablet, TAKE 1 TABLET EVERY MORNINGBEFORE BREAKFAST, Disp: 90 tablet, Rfl: 3    glucose blood (Accu-Chek Darcy Plus) test strip, CHECK GLUCOSE TWO TIMES A  DAY FOR DIABETES MELLITUS, Disp: 200 each, Rfl: 3    glucose monitor monitoring kit, ACCU-CHECK DARCY PLUS METER as requested., Disp: 1 each, Rfl: 1    Lancets Misc. misc, TEST TWICE A DAY FOR DM. E11.9. patient requests acc-check softclick, Disp: 200 each, Rfl: 3    lidocaine (LIDODERM) 5 %, Place 1 patch on the skin as directed by provider Daily. Remove & Discard patch within 12 hours or as directed by MD, Disp: 30 patch, Rfl: 0    losartan-hydrochlorothiazide (HYZAAR) 100-12.5 MG per tablet, Take 1 tablet by mouth Daily. Indications: High Blood Pressure Disorder, Disp: 90 tablet, Rfl: 3    meloxicam (Mobic) 7.5 MG tablet, Take 1 tablet by mouth Daily., Disp: 14 tablet, Rfl: 0    metoprolol tartrate (LOPRESSOR) 50 MG tablet, TAKE 1 TABLET TWICE A DAY, Disp: 180 tablet, Rfl: 1    multivitamin with minerals tablet tablet, Take 1 tablet by mouth Daily., Disp: , Rfl:     polyethylene glycol (MiraLax) 17 g packet, Mix 238 gram powder with 64 oz of clear liquid starting at 5 pm. Drink 8 oz every 10-15 minutes until consumed., Disp: 238 each, Rfl: 0    tamsulosin (FLOMAX) 0.4 MG capsule 24 hr capsule, Take 1 capsule by mouth Daily for 360 days., Disp: 90 capsule, Rfl: 3    traZODone (DESYREL) 50 MG tablet, TAKE 1 TABLET EVERY NIGHT, Disp: 90 tablet, Rfl: 3    triamcinolone (KENALOG) 0.1 % cream, APPLY 1 APPLICATION        TOPICALLY TO THE           APPROPRIATE AREA AS        DIRECTED TWO TIMES A DAY, Disp: 80 g, Rfl: 3    Past Surgical History:   Procedure Laterality Date    COLONOSCOPY      HAND SURGERY Left     LEFT PINKY SEWN BACK ON in [place] , cut off with table saw       Family History   Problem Relation Age of Onset    Cancer Father   "      Social History     Socioeconomic History    Marital status:    Tobacco Use    Smoking status: Former     Packs/day: 2.00     Years: 15.00     Additional pack years: 0.00     Total pack years: 30.00     Types: Cigarettes     Quit date: 1991     Years since quittin.1     Passive exposure: Past    Smokeless tobacco: Never   Vaping Use    Vaping Use: Never used   Substance and Sexual Activity    Alcohol use: Never    Drug use: Never    Sexual activity: Not Currently     Partners: Female        Allergies   Allergen Reactions    Metformin Diarrhea    Sulfa Antibiotics Unknown - Low Severity     Patient states he had allergic reaction to sulfa drugs when he was a child.       Review of Systems   Musculoskeletal:  Positive for arthralgias (right hip).       I have reviewed the medical and surgical history, family history, social history, medications, and/or allergies, and the review of systems of this report.    PHYSICAL EXAMINATION:       /84 (BP Location: Left arm, Patient Position: Sitting, Cuff Size: Adult)   Ht 177.8 cm (70\")   Wt 95.3 kg (210 lb)   BMI 30.13 kg/m²     GENERAL [x] Well developed  []Ill appearing [x] No acute distress    HEENT [x]No acute changes [x] Normocephalic, atraumatic    NECK [x]Supple  [] No midline tenderness    LUNGS [x]Clear bilaterally [x]No wheezes []Rhonchi [] Rales    HEART [x] Regular rate  [x] Regular rhythm [] Irregular    ABDOMEN [x] Soft [x] Not tender [x] Not distended [x] Normal sounds    VAS/EXT [x] Normal Pulses []Edema []Cyanosis             SKIN [x] Warm [x]Dry []Pink []Ecchymosis []Cool    NEURO [x] Sensation Intact [x] Motor Intact [x] Pulse intact  [] Dysesthesias:_________  []Weakness:__________             Physical Exam  Vitals and nursing note reviewed.   Constitutional:       Appearance: Normal appearance. He is not ill-appearing.   Pulmonary:      Effort: Pulmonary effort is normal.   Neurological:      Mental Status: He is alert and " oriented to person, place, and time.       Right Hip Exam     Tenderness   The patient is experiencing tenderness in the anterior.    Range of Motion   Abduction:  10   Adduction:  15   Flexion:  70     Muscle Strength   Abduction: 4/5   Adduction: 4/5   Flexion: 4/5     Tests   RAYSHAWN: positive  Fadir:  Positive FADIR test          Extremity DVT signs are negative on physical exam with negative Jenny sign, no calf pain, no palpable cords, and no skin tone change   Neurologic Exam     Mental Status   Oriented to person, place, and time.           DVT Screening: No Yes   Smoker [x] []   Personal/Family History of DVT or PE [x] []   Sedentary Lifestyle [x] []   BMI >30 [x] []      Tranexamic acid TXA criteria for usage during arthroplasty surgery.    Previous or Active DVT/PE: NO  Cardiac Stent within 1 year: NO  Embolic/Ischemic stroke within 1 year: NO  GFR less than 30ml/min (advanced kidney disease): NO  TXA  tranexemic acid summary: THIS PATIENT IS A CANDIDATE FOR IV TXA DURING SURGERY.     Independent Review of Radiographic Studies:    X-ray of the right hip 1 view performed in the clinic for the evaluation of chronic right hip pain. ( He could not abduct the right hip due to pain) Comparison films are available and reviewed. There is severe right hip Degenerative changes c/w Tonnis stage 3          Diagnoses and all orders for this visit:    1. Arthralgia of right hip (Primary)  -     XR Hip With or Without Pelvis 2 - 3 View Right; Future    2. Primary osteoarthritis of right hip  -     XR Hip With or Without Pelvis 2 - 3 View Right; Future         *SPECIAL INSTRUCTIONS:  Multi-modal analgesia.  Tranexamic acid IV protocol (see screening parameters this report).  Multi-modal DVT prophylaxis.  Rehabilitation PT/OT protocol with same day walker ambulation with therapist.      Risks, benefits, and alternative treatments discussed with the patient: [x] Yes [] No    Risk benefits and merits of the proposed surgery  were discussed and the patient's questions were answered risks discussed including and not limited to:  Anesthesia reactions  Blood loss and possible transfusion  Infection  Deep venous thrombosis and pulmonary embolus  Nerve, vascular or tendon injury  Fracture  Deformity  Stiffness  Weakness  Prosthesis and implant issues such as loosening, material wear or dislocation  Skin necrosis  Revision surgery or further treatment  Recurrence of problem and condition     Informed consent: [] Signed  [x] To be obtained at hospital  [] Both        Recommendations/Plan:  Discussion of orthopaedic goals and activities and patient expressed appreciation.  Risk, benefits, and merits of treatment alternatives reviewed with the patient and questions answered  Regular exercise as tolerated  Guided on proper techniques for mobility and conditioning exercises  The nature of the proposed surgery reviewed with patient including risks, benefits, rehabilitation, recovery time, and outcome expectations      PLANNED SURGICAL PROCEDURE: Elective right total hip arthroplasty     From cardiology:    Polina Rendon APRN   Nurse Practitioner  Specialty: Cardiology     Telephone Encounter     Signed     Encounter Date: 8/21/2023     Signed         Patient can HOLD eliquis 3-5 days pre-procedure or per surgeon's preference.  Eliquis can be resumed post-op day 1 or when surgeon deems reasonable.              BMI is >= 30 and <35. (Class 1 Obesity). The following options were offered after discussion;: weight loss educational material (shared in after visit summary)    Patient is encouraged and agreeable to call or return sooner for any issues or concerns.      Electronically signed by Oumar Wilson PA-C at 01/30/24 0900       Vital Signs (last day)       Date/Time Temp Temp src Pulse Resp BP Patient Position SpO2    02/14/24 0714 96.9 (36.1) Oral -- 19 139/75 Sitting --    02/13/24 2356 97.9 (36.6) Temporal -- 18 139/79 Lying --     02/13/24 1827 98.2 (36.8) Oral 93 18 144/88 Sitting 96    02/13/24 1647 -- -- 92 22 118/83 Sitting 94    02/13/24 1547 -- -- 79 20 122/71 Lying 95    02/13/24 1447 -- -- 96 22 127/90 Lying 97    02/13/24 1347 -- -- 76 20 108/91 Sitting 95    02/13/24 1317 -- -- 71 20 127/75 Sitting 98    02/13/24 1247 97.1 (36.2) -- 66 20 97/63 Sitting 97    02/13/24 1240 97.6 (36.4) Temporal 70 16 110/48 Lying 98    02/13/24 1230 -- -- 61 16 97/63 Lying 97    02/13/24 1225 -- -- 70 14 97/61 Lying 96    02/13/24 1220 -- -- 74 14 101/61 Lying 95    02/13/24 1215 -- -- 61 16 99/59 Lying 96    02/13/24 1210 -- -- 62 14 86/58 Lying 95    02/13/24 1209 -- -- 64 -- 83/50 -- --    02/13/24 1205 -- -- 63 14 89/49 Lying 93    02/13/24 1200 -- -- 59 14 82/51 Lying 93    02/13/24 1155 -- -- 58 14 75/50 Lying 92    02/13/24 1150 -- -- 60 16 72/45 Lying 93    02/13/24 1145 -- -- 71 16 85/54 Lying 98    02/13/24 1140 -- -- 61 16 76/52 Lying 95    02/13/24 1135 -- -- 71 14 77/47 Lying 96    02/13/24 1130 -- -- 62 14 81/47 Lying 94    02/13/24 1125 -- -- 62 16 96/69 Lying 96    02/13/24 1120 -- -- 64 16 103/68 Lying 96    02/13/24 1115 -- -- 73 16 97/71 Lying 95    02/13/24 1110 -- -- 66 14 102/72 Lying 96    02/13/24 1100 -- -- 53 16 160/86 Lying 96    02/13/24 1054 97 (36.1) Temporal 56 14 124/80 Lying 96    02/13/24 0632 97.1 (36.2) Temporal 68 16 117/89 Sitting 96          Current Facility-Administered Medications   Medication Dose Route Frequency Provider Last Rate Last Admin    atorvastatin (LIPITOR) tablet 20 mg  20 mg Oral Nightly Modesto Dickens MD   20 mg at 02/13/24 2030    cetirizine (zyrTEC) tablet 10 mg  10 mg Oral Daily Modesto Dickens MD   10 mg at 02/13/24 1826    docusate sodium (COLACE) capsule 100 mg  100 mg Oral BID Modesto Dickens MD        empagliflozin (JARDIANCE) tablet 10 mg  10 mg Oral Daily Modesto Dickens MD   10 mg at 02/13/24 1826    Enoxaparin Sodium (LOVENOX) syringe 40 mg  40 mg Subcutaneous Q24H  Oumar Wilson PA-C   40 mg at 02/14/24 0504    glimepiride (AMARYL) tablet 2 mg (Non-Formulary, Patient Supplied)  2 mg Oral QAM AC Modesto Dickens MD        losartan (COZAAR) tablet 100 mg  100 mg Oral Q24H Modesto Dickens MD        And    hydroCHLOROthiazide oral 12.5 mg  12.5 mg Oral Q24H Modesto Dickens MD        HYDROcodone-acetaminophen (NORCO)  MG per tablet 1 tablet  1 tablet Oral Q4H PRN Modesto Dickens MD   1 tablet at 02/14/24 0654    HYDROmorphone (DILAUDID) injection 0.5 mg  0.5 mg Intravenous Q2H PRN Modesto Dickens MD        And    naloxone (NARCAN) injection 0.1 mg  0.1 mg Intravenous Q5 Min PRN Modesto Dickens MD        lactated ringers infusion 1,000 mL  1,000 mL Intravenous Continuous Modesto Dickens MD   Stopped at 02/13/24 1645    metoprolol tartrate (LOPRESSOR) tablet 50 mg  50 mg Oral BID Modesto Dickens MD   50 mg at 02/13/24 2030    ondansetron ODT (ZOFRAN-ODT) disintegrating tablet 4 mg  4 mg Oral Q6H PRN Modesto Dickens MD        Or    ondansetron (ZOFRAN) injection 4 mg  4 mg Intravenous Q6H PRN Modesto Dickens MD        Pharmacy to Dose enoxaparin (LOVENOX)   Does not apply Continuous PRN Modesto Dickens MD        tamsulosin (FLOMAX) 24 hr capsule 0.4 mg  0.4 mg Oral Daily Modesto Dickens MD   0.4 mg at 02/13/24 1826    traZODone (DESYREL) tablet 50 mg  50 mg Oral Nightly Modesto Dickens MD   50 mg at 02/13/24 2030     Lab Results (last 24 hours)       Procedure Component Value Units Date/Time    POC Glucose Once [[id number]]  (Abnormal) Collected: 02/14/24 0746    Specimen: Blood Updated: 02/14/24 0751     Glucose 201 mg/dL      Comment: Serial Number: [id number]:  [id number]       POC Glucose Once [[id number]]  (Abnormal) Collected: 02/13/24 1349    Specimen: Blood Updated: 02/13/24 1351     Glucose 142 mg/dL      Comment: Serial Number: [id number]:  [id number]       TISSUE EXAM, [facility]) [[id number]]  Collected: 02/13/24 0923    Specimen: Bone from Hip, Right Updated: 02/13/24 1328          Imaging Results (Last 24 Hours)       Procedure Component Value Units Date/Time    XR Pelvis 1 or 2 View [[id number]] Collected: 02/13/24 1230     Updated: 02/13/24 1239    Narrative:      PROCEDURE: XR PELVIS 1 OR 2 VW-     COMPARISON: 01/30/2024     1 VIEW     HISTORY: post-op R THR; Z96.641-Presence of right artificial hip joint;  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 There has been interval  right hip arthroplasty. Hardware is unremarkable. Severe degenerative  changes of the left hip are noted.       Impression:      Postoperative changes as above.              This report was signed and finalized on 2/13/2024 12:36 PM by Bharat Cobb MD.             Orders (last 24 hrs)        Start     Ordered    02/14/24 1600  Vital Signs  Every 8 Hours         02/13/24 1806 02/14/24 0900  losartan (COZAAR) tablet 100 mg  Every 24 Hours Scheduled        See Hyperspace for full Linked Orders Report.    02/13/24 1755    02/14/24 0900  hydroCHLOROthiazide oral 12.5 mg  Every 24 Hours Scheduled        See Hyperspace for full Linked Orders Report.    02/13/24 1755    02/14/24 0835  OT Consult: Eval & Treat ADL Performance Below Baseline  Once        Comments: Reason Why ot Needed: assess needs    02/14/24 0834    02/14/24 0826  Outpatient In A Bed  Once         02/14/24 0825    02/14/24 0800  Up in Chair 3x / Day - Beginning POD 1  3 Times Daily         02/13/24 1806    02/14/24 0800  Ambulate in [place] 4x / Day Beginning POD 1  4 Times Daily       02/13/24 1806 02/14/24 0752  POC Glucose Once  PROCEDURE ONCE        Comments: Complete no more than 45 minutes prior to patient eating      02/14/24 0746    02/14/24 0730  glimepiride (AMARYL)  tablet 2 mg (Non-Formulary, Patient Supplied)  Every Morning Before Breakfast         02/13/24 1755 02/14/24 0700  POC Glucose Finger TID AC  3 Times Daily Before Meals      Comments: Complete no more than 45 minutes prior to patient eating      02/13/24 1747 02/14/24 0600  Pharmacy to Dose enoxaparin (LOVENOX)  Continuous PRN         02/13/24 1806 02/14/24 0600  Enoxaparin Sodium (LOVENOX) syringe 40 mg  Every 24 Hours         02/13/24 1821 02/13/24 2100  docusate sodium (COLACE) capsule 100 mg  2 Times Daily         02/13/24 1741 02/13/24 2100  atorvastatin (LIPITOR) tablet 20 mg  Nightly         02/13/24 1755 02/13/24 2100  metoprolol tartrate (LOPRESSOR) tablet 50 mg  2 Times Daily         02/13/24 1755 02/13/24 2100  traZODone (DESYREL) tablet 50 mg  Nightly         02/13/24 1755 02/13/24 2000  Vital Signs  Every 4 Hours       02/13/24 1806 02/13/24 2000  Neurovascular Checks  Every 4 Hours       02/13/24 1806 02/13/24 2000  Turn, Cough & Deep Breathe Every 2 Hours Until Ambulating  Every 2 Hours       02/13/24 1806 02/13/24 2000  Incentive Spirometry  Every 2 Hours While Awake      Comments: Until lungs are clear and no productive cough.    02/13/24 1806 02/13/24 2000  ceFAZolin Sodium-Dextrose (ANCEF) IVPB (duplex) 2,000 mg  Every 8 Hours         02/13/24 1806 02/13/24 1845  cetirizine (zyrTEC) tablet 10 mg  Daily         02/13/24 1755 02/13/24 1845  empagliflozin (JARDIANCE) tablet 10 mg  Daily         02/13/24 1755 02/13/24 1845  tamsulosin (FLOMAX) 24 hr capsule 0.4 mg  Daily         02/13/24 1755 02/13/24 1807  Anesthesia Follow-Up  Once        Provider:  (Not yet assigned)    02/13/24 1806 02/13/24 1807  Intake and Output  Every Shift       02/13/24 1806 02/13/24 1807  Weight Bearing - As Tolerated  Continuous         02/13/24 1806 02/13/24 1807  Up in Chair x 1 Day of Surgery  Once         02/13/24 1806    02/13/24 1807  Same Day Discharge  Patients - Up in Chair x2 on Day of Surgery  Every Shift       02/13/24 1806    02/13/24 1807  Ambulate in [place] x1 Day of Surgery  Once         02/13/24 1806    02/13/24 1807  Instruct Patient to Do At Least 10 Ankle Pumps Per Hour While Awake  Once        Comments: Instruct Patient to Do At Least 10 Ankle Pumps Per Hour While Awake    02/13/24 1806    02/13/24 1807  May Stand to Void or Use Bedside Commode Day of Surgery. POD 1 & Thereafter Use Bedside Commode or Bathroom  Once         02/13/24 1806    02/13/24 1807  Saline Lock IV With Adequate PO Intake  Continuous         02/13/24 1806 02/13/24 1807  Oxygen Therapy-  Continuous         02/13/24 1806 02/13/24 1807  Advance Diet As Tolerated -  Until Discontinued         02/13/24 1806    02/13/24 1807  Code Status and Medical Interventions:  Continuous         02/13/24 1806    02/13/24 1807  Diet: Regular/House Diet; Texture: Regular Texture (IDDSI 7); Fluid Consistency: Thin (IDDSI 0)  Diet Effective Now,   Status:  Canceled         02/13/24 1806    02/13/24 1807  PT Consult: Eval & Treat  Once         02/13/24 1806 02/13/24 1807  Diet: Diabetic Diets; Consistent Carbohydrate; Texture: Soft to Chew (NDD 3); Soft to Chew: Whole Meat; Fluid Consistency: Thin (IDDSI 0)  Diet Effective Now         02/13/24 1807    02/13/24 1806  HYDROcodone-acetaminophen (NORCO)  MG per tablet 1 tablet  Every 4 Hours PRN         02/13/24 1806 02/13/24 1806  HYDROmorphone (DILAUDID) injection 0.5 mg  Every 2 Hours PRN        See Hyperspace for full Linked Orders Report.    02/13/24 1806 02/13/24 1806  naloxone (NARCAN) injection 0.1 mg  Every 5 Minutes PRN        See Hyperspace for full Linked Orders Report.    02/13/24 1806 02/13/24 1806  ondansetron ODT (ZOFRAN-ODT) disintegrating tablet 4 mg  Every 6 Hours PRN        See Hyperspace for full Linked Orders Report.    02/13/24 1806 02/13/24 1806  ondansetron (ZOFRAN) injection 4 mg  Every 6 Hours PRN         See Hyperspace for full Linked Orders Report.    02/13/24 1806    02/13/24 1740  Inpatient Admission  Once         02/13/24 1741    02/13/24 1732  Inpatient Case Management  Consult  Once        Provider:  (Not yet assigned)    02/13/24 1741    02/13/24 1352  POC Glucose Once  PROCEDURE ONCE        Comments: Complete no more than 45 minutes prior to patient eating      02/13/24 1349    02/13/24 1200  ceFAZolin Sodium-Dextrose (ANCEF) IVPB (duplex) 2,000 mg  Once         02/13/24 1111    02/13/24 1153  ePHEDrine Sulfate (Pressors) 5 MG/ML injection 5 mg  As Needed,   Status:  Discontinued         02/13/24 1154    02/13/24 1150  Assess Need for Indwelling Urinary Catheter - Follow Removal Protocol  Continuous        Comments: Follow Protocol As Outlined in Process Instructions (Open Order Report to View Full Instructions)    02/13/24 1149    02/13/24 1150  Urinary Catheter Care  Every Shift       02/13/24 1149    02/13/24 1149  Oxygen Therapy- Nasal Cannula; 2 LPM  Continuous PRN,   Status:  Canceled       02/13/24 1149    02/13/24 1128  Assess Need for Indwelling Urinary Catheter - Initiate Removal Protocol  Continuous,   Status:  Canceled        Comments: Follow Protocol As Outlined in Process Instructions (Open Order Report to View Full Instructions)    02/13/24 1127    02/13/24 1128  Discontinue Indwelling Urinary Catheter  Once,   Status:  Canceled         02/13/24 1127    02/13/24 1128  Notify Provider if Bladder Distention Continues  Until Discontinued         02/13/24 1127    02/13/24 1128  Consult Pharmacist For Review of Medications That May Cause Urinary Retention - RN To Place Order for Consult it Needed  Continuous         02/13/24 1127    02/13/24 1112  Discharge patient  Once,   Status:  Canceled         02/13/24 1116    02/13/24 1111  PT Consult: Eval & Treat As Tolerated; Post Surgery Care  Once,   Status:  Canceled        Comments: Joint replacement protocol    02/13/24 1111     02/13/24 1111  XR Pelvis 1 or 2 View  1 Time Imaging         02/13/24 1111    02/13/24 1110  HYDROcodone-acetaminophen (NORCO)  MG per tablet 1 tablet  Once As Needed         02/13/24 1111    02/13/24 1110  ondansetron (ZOFRAN) injection 4 mg  Once As Needed,   Status:  Discontinued         02/13/24 1111    02/13/24 1100  Vital signs every 5 minutes for 15 minutes, every 15 minutes thereafter.  Once,   Status:  Canceled         02/13/24 1059    02/13/24 1100  Notify Anesthesia of Any Acute Changes in Patient Condition  Until Discontinued,   Status:  Canceled         02/13/24 1059    02/13/24 1100  Notify Anesthesia for Unrelieved Pain  Until Discontinued,   Status:  Canceled         02/13/24 1059    02/13/24 1100  Once DC criteria to floor met, follow surgeon's orders.  Continuous,   Status:  Canceled         02/13/24 1059    02/13/24 1100  Discharge patient from PACU when discharge criteria is met.  Continuous,   Status:  Canceled         02/13/24 1059    02/13/24 1059  HYDROmorphone (DILAUDID) injection 0.5 mg  Every 15 Minutes PRN,   Status:  Discontinued         02/13/24 1059    02/13/24 0924  TISSUE EXAM, [facility])  RELEASE UPON ORDERING         02/13/24 0924    02/13/24 0908  Insert Indwelling Urinary Catheter  Once,   Status:  Canceled        Comments: Follow Protocol As Outlined in Process Instructions (Open Order Report to View Full Instructions)    02/13/24 0907    02/13/24 0858  sodium chloride 3,000 mL with ceFAZolin 1 g irrigation  As Needed,   Status:  Discontinued         02/13/24 0858    02/13/24 0730  lactated ringers infusion 1,000 mL  Continuous         02/13/24 0638    02/13/24 0730  tranexamic acid 1000 mg in 100 mL 0.7% NaCl infusion (premix)  On Call to O.R.,   Status:  Discontinued         02/13/24 0638    02/13/24 0000  Discharge Follow-up with Specified Provider: Loyd Dickens MD         02/13/24 1116    02/13/24 0000  Discharge Follow-up with PCP         02/13/24 1116     02/13/24 0000  Weight Bearing As Tolerated        Comments: Protected weight bearing as tolerated, with cane, crutches or walker as appropriate for condition and safety.    02/13/24 1116    02/13/24 0000  Change Dressing        Comments: Change dressing once daily starting fifth post-operative day, and keep a clean dry dressing in place.    02/13/24 1116    02/13/24 0000  Patient May Shower; No Tub Baths, Pools or Hot Tubs        Comments: May change dressing routinely, keep a clean dry dressing in place.    02/13/24 1116    02/13/24 0000  Apply Ice to Affected Extremity Every 2 Hours        Comments: Only 2 hours maximum at a time, only three to four times every 24 hours.    02/13/24 1116    02/13/24 0000  Elevate Affected Extremity At or Above Level of Heart        Comments: Keep above level of heart at periodic intervals to reduce swelling.    02/13/24 1116    02/13/24 0000  Walker  Standard Walker Folding         02/13/24 1116    02/13/24 0000  docusate sodium (Colace) 100 MG capsule  2 Times Daily PRN         02/13/24 1116    02/13/24 0000  ondansetron (Zofran) 4 MG tablet  Every 12 Hours PRN         02/13/24 1116    02/13/24 0000  Ambulatory Referral to Home Health ([facility])         02/13/24 1116    Unscheduled  Pulse Oximetry, Continuous  Continuous PRN       02/13/24 1149    Unscheduled  Bladder Scan if Patient Unable to Void 4-6 Hours After Catheter Removal  As Needed         02/13/24 1127    Unscheduled  If Bladder Scan Volume is Less Than 500mL & Patient is Without Symptoms of Bladder Discomfort / Distention Monitor Every 1-2 Hours for Spontaneous Void  As Needed       02/13/24 1127    Unscheduled  Straight Cath Every 4-6 Hours As Needed If Patient is Unable to Void After 4-6 Hours, Bladder Scan Volume is Greater Than 500mL & Patient Has Symptoms of Bladder Discomfort / Distention  As Needed       02/13/24 1127    Unscheduled  Schedule / Prompt Voiding For Patients With Urinary Incontinence  As  Needed       02/13/24 1127    Unscheduled  Ice to Incision for 48 Hours  As Needed       02/13/24 1806    Unscheduled  Apply Ice to Incision PRN for Edema, After Activity or Exercise, and to Lessen Discomfort  As Needed       02/13/24 1806    Unscheduled  Patient May Shower With Assistance  As Needed       02/13/24 1806    Unscheduled  Incentive Spirometry  As Needed      Comments: PRN Every 2 Hours While Awake.    02/13/24 1806                  Physician Progress Notes (most recent note)    No notes of this type exist for this encounter.       Consult Notes (most recent note)    No notes of this type exist for this encounter.

## 2024-02-14 NOTE — THERAPY TREATMENT NOTE
"Patient Name: Yehuda Capellan  : 1955    MRN: [id number]                              Today's Date: 2024       Admit Date: 2024    Visit Dx:     ICD-10-CM ICD-9-CM   1. Status post total hip replacement, right  Z96.641 V43.64   2. Primary osteoarthritis of right hip  M16.11 715.15   3. Neuropathy due to medical condition  G63 357.4     Patient Active Problem List   Diagnosis    Atrial fibrillation, chronic    Dyslipidemia    Essential hypertension    Type 2 diabetes mellitus with hyperglycemia, without long-term current use of insulin    Multinodular goiter (nontoxic)    Arthralgia of right hip    Primary osteoarthritis of right hip    Microalbuminuria due to type 2 diabetes mellitus    Screening for colon cancer    OA (osteoarthritis) of hip    Status post total hip replacement, right    S/P total right hip arthroplasty     Past Medical History:   Diagnosis Date    Allergic     Arthritis     Atrial fibrillation     \"permanent\" per Dr. Grajeda note on 23; on Eliquis    Diabetes mellitus     Elevated cholesterol     Elevated glucose 2017    A1C 6.7    Hip fracture     right, at age 17    Hyperlipidemia     Hypertension     Impaired functional mobility, balance, gait, and endurance     Microalbuminuria due to type 2 diabetes mellitus 2023    Obesity     Recurrent UTI     seen by Dr. Molina      Past Surgical History:   Procedure Laterality Date    COLONOSCOPY      HAND SURGERY Left     LEFT PINKY SEWN BACK ON in [place] , cut off with table saw      General Information       Row Name 24 1646 24 1515       Physical Therapy Time and Intention    Document Type therapy note (daily note)  -JR evaluation  -MS    Mode of Treatment physical therapy  -JR physical therapy  -MS      Row Name 24 1646 24 1515       General Information    Patient Profile Reviewed yes  -JR yes  -MS    Prior Level of Function -- independent:;all household mobility;community mobility  -MS "    Existing Precautions/Restrictions -- fall  -MS    Barriers to Rehab -- medically complex;previous functional deficit  -MS      Row Name 02/13/24 1515          Living Environment    People in Home spouse  -MS       Row Name 02/13/24 1515          Home Main Entrance    Number of Stairs, Main Entrance none  ramp  -MS       Row Name 02/13/24 1515          Cognition    Orientation Status (Cognition) oriented x 4  -MS       Row Name 02/13/24 1515          Safety Issues, Functional Mobility    Safety Issues Affecting Function (Mobility) insight into deficits/self-awareness;safety precautions follow-through/compliance;ability to follow commands;positioning of assistive device;awareness of need for assistance;safety precaution awareness;sequencing abilities  -MS     Impairments Affecting Function (Mobility) balance;endurance/activity tolerance;motor control;muscle tone abnormal;pain;range of motion (ROM);shortness of breath;strength  -MS               User Key  (r) = Recorded By, (t) = Taken By, (c) = Cosigned By      Initials Name Provider Type    JR Steph Alejandre, PT Physical Therapist    MS Alejandro Hdz, PT Physical Therapist                   Mobility       Row Name 02/13/24 1646 02/13/24 1522       Bed Mobility    Bed Mobility supine-sit  -JR supine-sit;sit-supine  -MS    All Activities, [place] (Bed Mobility) -- minimum assist (75% patient effort)  -MS    Supine-Sit [place] (Bed Mobility) minimum assist (75% patient effort)  -JR minimum assist (75% patient effort)  -MS    Sit-Supine [place] (Bed Mobility) -- moderate assist (50% patient effort)  -MS    Assistive Device (Bed Mobility) head of bed elevated  -JR bed rails;draw sheet;head of bed elevated  -MS      Row Name 02/13/24 1646 02/13/24 1522       Sit-Stand Transfer    Sit-Stand [place] (Transfers) minimum assist (75% patient effort)  - moderate assist (50% patient effort)  -MS    Assistive Device (Sit-Stand Transfers) walker,  front-wheeled  -JR walker, front-wheeled  -MS    Comment, (Sit-Stand Transfer) -- difficulty achieving upright standing due to R knee buckling  -MS      Row Name 02/13/24 1646 02/13/24 1522       Gait/Stairs (Locomotion)    [place] Level (Gait) minimum assist (75% patient effort);moderate assist (50% patient effort)  -JR moderate assist (50% patient effort)  -MS    Assistive Device (Gait) walker, front-wheeled  -JR walker, front-wheeled  -MS    Patient was able to Ambulate yes  -JR no, other medical factors prevent ambulation  -MS    Reason Patient was unable to Ambulate -- Limited Motor Function related to Block  -MS    Distance in Feet (Gait) 14  -JR --  did take 2 side steps to HOB  -MS    Deviations/Abnormal Patterns (Gait) antalgic;base of support, wide;festinating/shuffling  -JR --    Right Sided Gait Deviations knee buckling, right side;lateral trunk flexion;forward flexed posture;weight shift ability decreased  -JR knee buckling, right side;lateral trunk flexion;forward flexed posture;weight shift ability decreased  -MS              User Key  (r) = Recorded By, (t) = Taken By, (c) = Cosigned By      Initials Name Provider Type    JR Steph Alejandre PT Physical Therapist    Alejandro Melo, PT Physical Therapist                   Obj/Interventions       Row Name 02/13/24 1525          Range of Motion Comprehensive    General Range of Motion lower extremity range of motion deficits identified  -MS       Row Name 02/13/24 1525          Strength Comprehensive (MMT)    General Manual Muscle Testing (MMT) Assessment lower extremity strength deficits identified  -MS     Comment, General Manual Muscle Testing (MMT) Assessment R LE weakness, able to perform partial ROM on R LE, L LE strength 4/5  -MS               User Key  (r) = Recorded By, (t) = Taken By, (c) = Cosigned By      Initials Name Provider Type    Alejandro Melo, PT Physical Therapist                   Goals/Plan       Row Name 02/13/24 7485           Bed Mobility Goal 1 (PT)    Activity/Assistive Device (Bed Mobility Goal 1, PT) bed mobility activities, all  -MS     [place] Level/Cues Needed (Bed Mobility Goal 1, PT) modified independence  -MS     Time Frame (Bed Mobility Goal 1, PT) long term goal (LTG);2 weeks  -MS       Row Name 02/13/24 1550          Transfer Goal 1 (PT)    Activity/Assistive Device (Transfer Goal 1, PT) transfers, all;sit-to-stand/stand-to-sit  -MS     [place] Level/Cues Needed (Transfer Goal 1, PT) modified independence  -MS     Time Frame (Transfer Goal 1, PT) long term goal (LTG);2 weeks  -MS       Row Name 02/13/24 1550          Gait Training Goal 1 (PT)    Activity/Assistive Device (Gait Training Goal 1, PT) gait (walking locomotion);assistive device use  -MS     [place] Level (Gait Training Goal 1, PT) modified independence  -MS     Distance (Gait Training Goal 1, PT) 250ft  -MS     Time Frame (Gait Training Goal 1, PT) long term goal (LTG);2 weeks  -MS       Row Name 02/13/24 1550          Patient Education Goal (PT)    Activity (Patient Education Goal, PT) ther exer on B LE x15 reps ea  -MS     [place]/Cues/Accuracy (Memory Goal 2, PT) demonstrates adequately  -MS     Time Frame (Patient Education Goal, PT) short term goal (STG);1 week  -MS       Row Name 02/13/24 1550          Therapy Assessment/Plan (PT)    Planned Therapy Interventions (PT) balance training;bed mobility training;gait training;home exercise program;strengthening;stair training;ROM (range of motion);postural re-education;patient/family education;neuromuscular re-education  -MS               User Key  (r) = Recorded By, (t) = Taken By, (c) = Cosigned By      Initials Name Provider Type    Alejandro Melo, PT Physical Therapist                   Clinical Impression       Row Name 02/13/24 1646 02/13/24 1533       Pain    Pretreatment Pain Rating 5/10  -JR 5/10  -MS    Posttreatment Pain Rating 5/10  -JR 5/10  -MS    Pain Location -  Side/Orientation Right  -JR Right  -MS    Pain Location - hip  -JR hip  -MS    Pain Intervention(s) Repositioned;Ambulation/increased activity  -JR --    Additional Documentation Pain Scale: Numbers Pre/Post-Treatment (Group)  -JR --      Row Name 02/13/24 1646 02/13/24 1533       Plan of Care Review    Plan of Care Reviewed With patient  -JR patient;spouse;daughter  -MS    Progress improving  -JR no change  -MS    Outcome Evaluation Patient was able to perform gait x 14 feet with right knee buckling x 3 during walk.  He has difficulty with turning and backing up to sit.  He has difficulty with posture and motor control of both legs.  He is not able to safely walk well enough to be discharged to home.  Patient is expected to benefit from additional PT services per  POC.  -JR Pt participated in PT eval this date. Pt s/p GINNY on the R LE. Pt reports pain 5/10 R LE. reviewed posterior hip precautions and home set up, left packet with information. Pt required modA for t/f to EOB, able to move R LE with assist. Pt assisted with Rwx and modA to standing, cues for hand placement to not pull on Rwx. Pt required x3 attempts to get to standing. In standing, Pt's R LE unable to get straight, Pt leaning very far anterior. Nsg assisted with pericare following BM. Pt eventually able to attempt taking side steps, however unable to weight bear through the R LE, wit hknee buckling and requiring modA to remain upright. Pt returned to supine. Explained to Pt that R LE still having difficulty with movemetn adn motor control following block, and would be unsafe to attempt ambulation at this time. Pt and family verbalized understanding, relayed info to RN. Pt would benefit from skilled PT tx to address deficits in strength, transfers and mobility.  -MS      Row Name 02/13/24 9953          Therapy Assessment/Plan (PT)    Rehab Potential (PT) good, to achieve stated therapy goals  -MS     Criteria for Skilled Interventions Met (PT)  yes;meets criteria  -MS     Therapy Frequency (PT) 5 times/wk  -MS       Row Name 02/13/24 1533          Vital Signs    O2 Delivery Pre Treatment room air  -MS     O2 Delivery Intra Treatment room air  -MS     O2 Delivery Post Treatment room air  -MS     Pre Patient Position Supine  -MS     Intra Patient Position Standing  -MS     Post Patient Position Supine  -MS       Row Name 02/13/24 1646 02/13/24 1533       Positioning and Restraints    Pre-Treatment Position in bed  -JR in bed  -MS    Post Treatment Position wheelchair  -JR bed  -MS    In Bed -- fowlers;call light within reach;encouraged to call for assist  -MS    In Wheelchair sitting;with nsg  -JR --              User Key  (r) = Recorded By, (t) = Taken By, (c) = Cosigned By      Initials Name Provider Type    JR Steph Alejandre, PT Physical Therapist    Alejandro Melo, PT Physical Therapist                   Outcome Measures       Row Name 02/13/24 1735 02/13/24 1700       5 Meter Walk    Was an Assistive Device Used? Yes (comment)  W/C  -RB Yes (comment)  w/c  -RB      Row Name 02/13/24 1647 02/13/24 1547       5 Meter Walk    Was an Assistive Device Used? No  -RB Yes (comment)  -RB      Row Name 02/13/24 1447 02/13/24 1442       5 Meter Walk    Was an Assistive Device Used? Yes (comment)  -RB Yes (comment)  -RB      Row Name 02/13/24 1317 02/13/24 1247       5 Meter Walk    Was an Assistive Device Used? No  -RB No  -RB      Row Name 02/13/24 1646 02/13/24 1552       How much help from another person do you currently need...    Turning from your back to your side while in flat bed without using bedrails? 3  -JR 2  -MS    Moving from lying on back to sitting on the side of a flat bed without bedrails? 3  -JR 2  -MS    Moving to and from a bed to a chair (including a wheelchair)? 2  -JR 2  -MS    Standing up from a chair using your arms (e.g., wheelchair, bedside chair)? 2  -JR 2  -MS    Climbing 3-5 steps with a railing? 1  -JR 1  -MS    To walk in  hospital room? 2  -JR 1  -MS    AM-PAC 6 Clicks Score (PT) 13  -JR 10  -MS    Highest Level of Mobility Goal 4 --> Transfer to chair/commode  -JR 4 --> Transfer to chair/commode  -MS      Row Name 02/13/24 1646 02/13/24 1552       Functional Assessment    Outcome Measure Options AM-PAC 6 Clicks Basic Mobility (PT)  -JR AM-PAC 6 Clicks Basic Mobility (PT)  -MS              User Key  (r) = Recorded By, (t) = Taken By, (c) = Cosigned By      Initials Name Provider Type    Steph Nelson, PT Physical Therapist    Soni Ambriz RN Registered Nurse    Alejandro Melo PT Physical Therapist                                 Physical Therapy Education       Title: PT OT SLP Therapies (In Progress)       Topic: Physical Therapy (In Progress)       Point: Mobility training (In Progress)       Learning Progress Summary             Patient Acceptance, E,TB, NR by  at 2/13/2024 1902    Comment: Posture to assist with knee extension                         Point: Home exercise program (Not Started)       Learner Progress:  Not documented in this visit.              Point: Body mechanics (Not Started)       Learner Progress:  Not documented in this visit.              Point: Precautions (Not Started)       Learner Progress:  Not documented in this visit.                              User Key       Initials Effective Dates Name Provider Type [facility] 08/22/23 -  Steph Alejandre, PT Physical Therapist PT                  PT Recommendation and Plan     Plan of Care Reviewed With: patient  Progress: improving  Outcome Evaluation: Patient was able to perform gait x 14 feet with right knee buckling x 3 during walk.  He has difficulty with turning and backing up to sit.  He has difficulty with posture and motor control of both legs.  He is not able to safely walk well enough to be discharged to home.  Patient is expected to benefit from additional PT services per  POC.     Time Calculation:         PT Charges       Row  Name 02/13/24 1724 02/13/24 1553          Time Calculation    Start Time 1720  -JR 1427  -MS     Stop Time 1744  -JR --     Time Calculation (min) 24 min  -JR --     PT Received On 02/13/24  -JR 02/13/24  -MS     PT Goal Re-Cert Due Date 02/23/24  -JR 02/23/24  -MS        Time Calculation- PT    Total Timed Code Minutes- PT 24 minute(s)  -JR --        Timed Charges    70325 - Gait Training Minutes  12 -JR --     27811 - PT Therapeutic Activity Minutes 12  -JR --        Untimed Charges    PT Eval/Re-eval Minutes -- 70  -MS        Total Minutes    Timed Charges Total Minutes 24 -JR --     Untimed Charges Total Minutes -- 70  -MS      Total Minutes 24 -JR 70  -MS               User Key  (r) = Recorded By, (t) = Taken By, (c) = Cosigned By      Initials Name Provider Type    Steph Nelson, PT Physical Therapist    Alejandro Melo, PT Physical Therapist                  Therapy Charges for Today       Code Description Service Date Service Provider Modifiers Qty    68393176116 HC GAIT TRAINING EA 15 MIN 2/13/2024 Steph Alejandre, PT GP 1    18273548059 HC PT THERAPEUTIC ACT EA 15 MIN 2/13/2024 Steph Alejandre, PT GP 1            PT G-Codes  Outcome Measure Options: AM-PAC 6 Clicks Basic Mobility (PT)  AM-PAC 6 Clicks Score (PT): 13       Steph Alejandre, PT  2/13/2024

## 2024-02-14 NOTE — CASE MANAGEMENT/SOCIAL WORK
Discharge Planning Assessment   [facility]     Patient Name: Yehuda Capellan  MRN: [id number]  Today's Date: 2/14/2024    Admit Date: 2/13/2024    Plan: Met with pt and his wife for dcp, pt provided demographics. He has an AD, no POA or financial concerns. Home dem is a cane, fw walker, raised toilet seat, and glucometer. His primary is [facility] [place] and he is enrolled in meds to bed. He and his spouse have been at current address 5years, he plans to return home with her transporting.   Discharge Needs Assessment       Row Name 02/14/24 1131       Living Environment    People in Home spouse    Current Living Arrangements home    Duration at Residence 5 yrs    Potentially Unsafe Housing Conditions none    In the past 12 months has the electric, gas, oil, or water company threatened to shut off services in your home? No    Primary Care Provided by self    Family Caregiver if Needed spouse    Quality of Family Relationships helpful;involved    Able to Return to Prior Arrangements yes       Resource/Environmental Concerns    Resource/Environmental Concerns none    Transportation Concerns none       Transportation Needs    In the past 12 months, has lack of transportation kept you from medical appointments or from getting medications? no    In the past 12 months, has lack of transportation kept you from meetings, work, or from getting things needed for daily living? No       Food Insecurity    Within the past 12 months, you worried that your food would run out before you got the money to buy more. Never true    Within the past 12 months, the food you bought just didn't last and you didn't have money to get more. Never true       Transition Planning    Patient/Family Anticipates Transition to home with family    Patient/Family Anticipated Services at Transition home health care    Transportation Anticipated family or friend will provide       Discharge Needs Assessment    Readmission Within the Last 30 Days no previous  admission in last 30 days    Concerns to be Addressed denies needs/concerns at this time    Anticipated Changes Related to Illness none    Equipment Needed After Discharge other (see comments)  [facility]                   [place]charge Plan       Row Name 02/14/24 1135       Plan    Plan Met with pt and his wife for dcp, pt provided demographics. He has an AD, no POA or financial concerns. Home dem is a cane, fw walker, raised toilet seat, and glucometer. His primary is Cindy [place] and he is enrolled in meds to bed. He and his spouse have been at current address 5years, he plans to return home with her transporting.                  Continued Care and Services - Discharged on 2/14/2024 Admission date: 2/13/2024 - Discharge disposition: Home-Health Care Svc      Durable Medical Equipment Coordination complete.      Service Provider Request Status Selected Services Address Phone Fax Patient Preferred    [facility]  [address] [facility] Medical [facility] [address] [id number] [phone] --       Internal Comment last updated by Tana Griffin, RN 2/14/2024 1032    [facility] [place]- pt states he has a walker at home                         Home Medical Care Coordination complete.      Service Provider Request Status Selected Services Address Phone Fax Patient Preferred     [facility]  Selected Home Nursing ,  Home Rehabilitation ,  Home Health Services [address], [facility] [address] [phone] --                  Expected Discharge Date and Time       Expected Discharge Date Expected Discharge Time    Feb 14, 2024            Demographic Summary       Row Name 02/14/24 1123       General Information    Admission Type inpatient    Arrived From home    Required Notices Provided Important Message from Medicare    Referral Source admission list    Reason for Consult discharge planning    Preferred Language English       Contact Information     Permission Granted to Share Info With family/designee                   Functional Status       Row Name 02/14/24 1130       Functional Status    Usual Activity Tolerance moderate    Current Activity Tolerance fair       Physical Activity    On average, how many days per week do you engage in moderate to strenuous exercise (like a brisk walk)? 0 days    On average, how many minutes do you engage in exercise at this level? 0 min    Number of minutes of exercise per week 0       Functional Status, IADL    Medications independent    Meal Preparation independent    Housekeeping independent    Laundry assistive person    Shopping assistive person       Mental Status    General Appearance WDL WDL       Mental Status Summary    Recent Changes in Mental Status/Cognitive Functioning no changes       Employment/    Employment Status retired    Current or Previous Occupation traveling/driving                   Psychosocial    No documentation.                  Abuse/Neglect    No documentation.                  Legal    No documentation.                  Substance Abuse    No documentation.                  Patient Forms    No documentation.                     Tana Griffin RN

## 2024-02-14 NOTE — PLAN OF CARE
Goal Outcome Evaluation:  Plan of Care Reviewed With: patient, spouse        Progress: improving  Outcome Evaluation: Pt sitting UIC and willing to participate with treatment. Pt prior to gait training was instructed in proper technique in turning toward R side as well as performed. Pt was also educated with proper sequencing during gait and use of walker and also performed . Pt was able to advance gait distance to 60' cga with rw. Spouse was eduated in proper use of gait belt when assisting pt with mobility.  THP were reviewed with pt and pt reported understanding.  Pt was given copy of HEP as well as copy placed in chart. Pt performed HEP and proper techniques reinforced.  Pt and spouse reported understanding. See flowsheet for details. Cont PT per POC progressing to goals as pt tolerates.

## 2024-02-14 NOTE — PLAN OF CARE
Goal Outcome Evaluation:  Plan of Care Reviewed With: patient, spouse        Progress: improving  Outcome Evaluation: OT eval completed. Patient is sitting in chair, wife is present. Patient is Ox4, reports 2/10 R hip pain at rest. Discussed GINNY precautions with patient with him only able to recall 1/3 GINNY precautions. Reiterated importance of maintaining precautions to prevent dislocation. Patient performed sit to stand with SBA, able to perform toileting task with SBA using urinal. Patient assisted in getting dressed using AE. Patient has a reacher and long handled sponge at home, would benefit from a sock aid, but reports his wife will do that for him. Patient has a RW, SPC, BSC and walk in shower; discussed with wife he will need a shower chair for safety during bathing tasks, as well as her assisting initially. Patient requires cues for safety and adherence to GINNY precautions. Patient is being DC'd home today with wife assisting and HH services, would benefit from a sock aid and shower chair.      Anticipated Discharge Disposition (OT): home with assist, home with home health, home with 24/7 care

## 2024-02-14 NOTE — PLAN OF CARE
Goal Outcome Evaluation:  Plan of Care Reviewed With: patient        Progress: improving  Outcome Evaluation: Patient was able to perform gait x 14 feet with right knee buckling x 3 during walk.  He has difficulty with turning and backing up to sit.  He has difficulty with posture and motor control of both legs.  He is not able to safely walk well enough to be discharged to home.  Patient is expected to benefit from additional PT services per  POC.

## 2024-02-15 ENCOUNTER — HOME CARE VISIT (OUTPATIENT)
Dept: HOME HEALTH SERVICES | Facility: HOME HEALTHCARE | Age: 69
End: 2024-02-15
Payer: MEDICARE

## 2024-02-15 PROCEDURE — G0151 HHCP-SERV OF PT,EA 15 MIN: HCPCS

## 2024-02-16 ENCOUNTER — HOME CARE VISIT (OUTPATIENT)
Dept: HOME HEALTH SERVICES | Facility: HOME HEALTHCARE | Age: 69
End: 2024-02-16
Payer: COMMERCIAL

## 2024-02-16 ENCOUNTER — TELEPHONE (OUTPATIENT)
Dept: ORTHOPEDIC SURGERY | Facility: CLINIC | Age: 69
End: 2024-02-16
Payer: MEDICARE

## 2024-02-16 VITALS
TEMPERATURE: 98 F | HEART RATE: 58 BPM | OXYGEN SATURATION: 96 % | DIASTOLIC BLOOD PRESSURE: 73 MMHG | SYSTOLIC BLOOD PRESSURE: 131 MMHG | RESPIRATION RATE: 16 BRPM

## 2024-02-16 PROCEDURE — G0152 HHCP-SERV OF OT,EA 15 MIN: HCPCS

## 2024-02-18 VITALS
TEMPERATURE: 97.3 F | RESPIRATION RATE: 17 BRPM | DIASTOLIC BLOOD PRESSURE: 76 MMHG | SYSTOLIC BLOOD PRESSURE: 142 MMHG | HEART RATE: 70 BPM | OXYGEN SATURATION: 95 %

## 2024-02-21 ENCOUNTER — HOME CARE VISIT (OUTPATIENT)
Dept: HOME HEALTH SERVICES | Facility: HOME HEALTHCARE | Age: 69
End: 2024-02-21
Payer: COMMERCIAL

## 2024-02-21 PROCEDURE — G0157 HHC PT ASSISTANT EA 15: HCPCS

## 2024-02-22 ENCOUNTER — HOME CARE VISIT (OUTPATIENT)
Dept: HOME HEALTH SERVICES | Facility: HOME HEALTHCARE | Age: 69
End: 2024-02-22
Payer: COMMERCIAL

## 2024-02-22 VITALS
SYSTOLIC BLOOD PRESSURE: 120 MMHG | TEMPERATURE: 97.9 F | RESPIRATION RATE: 16 BRPM | OXYGEN SATURATION: 97 % | HEART RATE: 81 BPM | DIASTOLIC BLOOD PRESSURE: 78 MMHG

## 2024-02-22 VITALS
SYSTOLIC BLOOD PRESSURE: 138 MMHG | HEART RATE: 75 BPM | DIASTOLIC BLOOD PRESSURE: 74 MMHG | TEMPERATURE: 97.8 F | OXYGEN SATURATION: 96 % | RESPIRATION RATE: 16 BRPM

## 2024-02-22 PROCEDURE — G0152 HHCP-SERV OF OT,EA 15 MIN: HCPCS

## 2024-02-22 NOTE — HOME HEALTH
"Routine Visit Note: Greeted at door by patient, who was first seen ambulating with rolling walker, showing no apparent signs of distress. Patient states that he is feeling \"pretty good for an old man\" and reports regular compliance with his HEP as prescribed.     Skill/education provided: Instructed gait training, therapeutic exercise, and balance training today. Educated patient on HEP, mobiltiy precautions, incision/post-surgical care, pain management, and fall prevention strategies.     Patient/caregiver response: Patient tolerated all treatment well today, with no signs of distress. Patient verbalizes understanding of all provided education and appears on track to meet his goals / exhibits improvement overall today.     Plan for next visit: Patient would benefit from continued skilled PT to address deficits in BLE strength, balance, endurance, gait quality, and to improve overall functional mobility / to facilitate post-surgical healing. Progress with exercise and gait training next visit and review education as needed."

## 2024-02-22 NOTE — HOME HEALTH
Patient tolerated OT session well. Pt. participated in 44 mins of ADLs/exercises: Pt  demonstrated good participation and progress with functional mobility and ADLs. Pt. will require review of HEP/EC techniques for ADLs. Continue OT per POC for increased strength/safety and independence with ADLs at home.

## 2024-02-23 ENCOUNTER — HOME CARE VISIT (OUTPATIENT)
Dept: HOME HEALTH SERVICES | Facility: HOME HEALTHCARE | Age: 69
End: 2024-02-23
Payer: COMMERCIAL

## 2024-02-23 ENCOUNTER — TELEPHONE (OUTPATIENT)
Dept: UROLOGY | Facility: CLINIC | Age: 69
End: 2024-02-23
Payer: MEDICARE

## 2024-02-23 VITALS
TEMPERATURE: 98.6 F | DIASTOLIC BLOOD PRESSURE: 74 MMHG | OXYGEN SATURATION: 97 % | SYSTOLIC BLOOD PRESSURE: 131 MMHG | HEART RATE: 62 BPM | RESPIRATION RATE: 16 BRPM

## 2024-02-23 PROCEDURE — G0157 HHC PT ASSISTANT EA 15: HCPCS

## 2024-02-23 NOTE — TELEPHONE ENCOUNTER
"Relay     \"LVM for PT to reschedule 02/20/24 Dr. Molina appointment to a follow up in the middle of March as timeframe was requested by PT\"                 "

## 2024-02-23 NOTE — HOME HEALTH
"Routine Visit Note: Greeted at door by patient, who was first seen ambulating with rolling walker, showing no apparent signs of distress. Patient states that he is feeling \"alright\" and reports regular compliance with his HEP as prescribed.     Skill/education provided: Instructed gait training, therapeutic exercise, and balance training today. Educated patient on HEP, post-surgical care, and pain management.     Patient/caregiver response: Patient tolerated all treatment well today, with no signs of distress, excessive fatigue, or exacerbation of pain symptoms. Patient verbalizes understanding of all provided education/mobility limitations, and appears on track to meet his goals / exhibits improvement overall today.     Plan for next visit: Patient would benefit from continued skilled PT to address deficits in LE/hip strength, balance, gait quality, and overall functional mobility / to facilitate post-surgical healing and recovery. Progress with exercise and gait training as tolerated and review/progress HEP appropriately."

## 2024-02-27 ENCOUNTER — OFFICE VISIT (OUTPATIENT)
Dept: ORTHOPEDIC SURGERY | Facility: CLINIC | Age: 69
End: 2024-02-27
Payer: MEDICARE

## 2024-02-27 ENCOUNTER — HOSPITAL ENCOUNTER (OUTPATIENT)
Dept: ULTRASOUND IMAGING | Facility: HOSPITAL | Age: 69
Discharge: HOME | End: 2024-02-27
Admitting: PHYSICIAN ASSISTANT
Payer: MEDICARE

## 2024-02-27 VITALS — TEMPERATURE: 97.7 F | HEIGHT: 70 IN | BODY MASS INDEX: 30.06 KG/M2 | WEIGHT: 210 LBS

## 2024-02-27 DIAGNOSIS — M79.89 CALF SWELLING: ICD-10-CM

## 2024-02-27 DIAGNOSIS — Z96.641 S/P TOTAL RIGHT HIP ARTHROPLASTY: ICD-10-CM

## 2024-02-27 DIAGNOSIS — Z96.641 S/P TOTAL RIGHT HIP ARTHROPLASTY: Primary | ICD-10-CM

## 2024-02-27 DIAGNOSIS — Z96.641 STATUS POST TOTAL HIP REPLACEMENT, RIGHT: Primary | ICD-10-CM

## 2024-02-27 PROCEDURE — 1160F RVW MEDS BY RX/DR IN RCRD: CPT | Performed by: PHYSICIAN ASSISTANT

## 2024-02-27 PROCEDURE — 99024 POSTOP FOLLOW-UP VISIT: CPT | Performed by: PHYSICIAN ASSISTANT

## 2024-02-27 PROCEDURE — 93971 EXTREMITY STUDY: CPT

## 2024-02-27 PROCEDURE — 1159F MED LIST DOCD IN RCRD: CPT | Performed by: PHYSICIAN ASSISTANT

## 2024-02-27 RX ORDER — HYDROCODONE BITARTRATE AND ACETAMINOPHEN 7.5; 325 MG/1; MG/1
1 TABLET ORAL EVERY 8 HOURS PRN
Qty: 21 TABLET | Refills: 0 | Status: SHIPPED | OUTPATIENT
Start: 2024-02-27

## 2024-02-27 NOTE — PROGRESS NOTES
"Subjective   Patient ID: Yehuda Capellan is a 68 y.o. right hand dominant male is here today for a post-operative visit.  Post-op of the Right Hip (Right total hip arthroplasty 2/13/24)       CHIEF COMPLAINT:    Progress and recovery after surgery.    History of Present Illness      Pain controlled: [] no   [x] yes   Medication refill requested: [x] no   [] yes    Patient compliant with instructions: [] no   [x] yes   Other: Reports fair progress since surgery.  He denies CP or SOA. He does endorse RLE edema \"since surgery\"  He takes Eliquis BID and assures me he hasn't missed a dose.       Past Medical History:   Diagnosis Date    Allergic     Arthritis     Atrial fibrillation     \"permanent\" per Dr. Grajeda note on 6/8/23; on Eliquis    Diabetes mellitus     Elevated cholesterol     Elevated glucose 09/29/2017    A1C 6.7    Hip fracture     right, at age 17    Hyperlipidemia     Hypertension     Impaired functional mobility, balance, gait, and endurance     Microalbuminuria due to type 2 diabetes mellitus 09/09/2023    Obesity     Recurrent UTI     seen by Dr. Molina 2023        Past Surgical History:   Procedure Laterality Date    COLONOSCOPY      HAND SURGERY Left     LEFT PINKY SEWN BACK ON in [place] , cut off with table saw    TOTAL HIP ARTHROPLASTY Right 2/13/2024    Procedure: Right total hip arthroplasty (BIOMET);  Surgeon: Modesto Dickens MD;  Location: [facility];  Service: Orthopedics;  Laterality: Right;       Allergies   Allergen Reactions    Metformin Diarrhea    Sulfa Antibiotics Unknown - Low Severity     Patient states he had allergic reaction to sulfa drugs when he was a child.       Review of Systems   Musculoskeletal:  Positive for joint swelling (RLE).     I have reviewed the medical and surgical history, family history, social history, medications, and/or allergies, and the review of systems of this report.    Objective   Temp 97.7 °F (36.5 °C)   Ht 177.8 cm (70\")   Wt 95.3 kg (210 lb) "   BMI 30.13 kg/m²       Signs of infection: [x] no                    [] yes   Drainage: [x] no                    [] yes   Incision: [x] healing well     []healed well   Motor exam intact: [] no                    [x] yes   Neurovascular exam intact: [] no                    [x] yes   Signs of compartment syndrome: [x] no                    [] yes   Signs of DVT: [] no                    [x] Yes + RLE calf LE swelling.   Other:      Physical Exam  Ortho Exam    Neurologic Exam    Assessment & Plan     Independent Review of Radiographic Studies:    No new imaging done today.         Procedures     Diagnoses and all orders for this visit:    1. S/P total right hip arthroplasty (Primary)  -     Ambulatory Referral to Physical Therapy Evaluate and treat, POST OP, Ortho  -     US Venous Doppler Lower Extremity Right (duplex); Future    2. Calf swelling  -     US Venous Doppler Lower Extremity Right (duplex); Future       Recommendations/Plan:     [x] Staples    [] Sutures [x] Removed today  [] At prior visit  [] Plan removal later   Physical therapy: []rehab facility  [x]outpatient referral  [] therapy ongoing   Ultrasound: Ordered today as STAT   Labs: []not ordered         []order given to patient   Weight Bearing status: []Full [x]WBAT []PWB []NWB []Other     Discussion of orthopaedic goals and activities and patient expressed appreciation.  Regular exercise as tolerated  Guided on proper techniques for mobility and conditioning exercises  Weight bearing parameters reviewed  Take prescribed medications as instructed only as tolerated     Exercise, medications other patient advice, and return appointment as noted.  Brace: No brace was given at today's visit.  Referral: No referrals made at today's visit.  Test/Studies: No additional studies ordered at this time.  Work/Activity Status: Usual activities, routine exercise as tolerated, light physical work as tolerated, no strenuous activity.  Will send for STAT US- use  your CODY stocking to RLE.  Follow up in 6 weeks or sooner if needed      Patient is encouraged and agreeable to call or return sooner for any issues or concerns.

## 2024-02-27 NOTE — TELEPHONE ENCOUNTER
----- Message from Oumar Wilson PA-C sent at 2/27/2024  3:53 PM EST -----  Please let patient know the US is negative for DVT

## 2024-02-28 ENCOUNTER — HOME CARE VISIT (OUTPATIENT)
Dept: HOME HEALTH SERVICES | Facility: HOME HEALTHCARE | Age: 69
End: 2024-02-28
Payer: COMMERCIAL

## 2024-02-28 PROCEDURE — G0157 HHC PT ASSISTANT EA 15: HCPCS

## 2024-02-29 VITALS
OXYGEN SATURATION: 97 % | HEART RATE: 81 BPM | SYSTOLIC BLOOD PRESSURE: 123 MMHG | RESPIRATION RATE: 16 BRPM | TEMPERATURE: 97.8 F | DIASTOLIC BLOOD PRESSURE: 75 MMHG

## 2024-02-29 NOTE — HOME HEALTH
"Routine Visit Note: Greeted at door by patient, who was first seen ambulating with rolling walker, showing no apparent signs of distress. Patient states that he is feeling \"okay, but my back hurts today\" and reports regular compliance with his HEP as prescribed / mentions that he is ambulating more throughout the day.    Skill/education provided: Instructed gait training and  therapeutic exercise. Educated patient on HEP, fall prevention, pain management, and post-surgical care.      Patient/caregiver response: Patient limited on exercise progressions today due to back pain. Patient verbalizes understanding of all provided education and appears on track to meet his goals despite back pain / mentions that he has had a history of chronic back pain on and off, and is willing to progress next treatment session if he is feeling better.    Plan for next visit: Patient would benefit from continued skilled PT to address deficits in LE/hip strength, balance, and to improve overall functional mobility / to facilitate post-surgical healing. Progress with therapeutic exercises and balance training as tolerated. Reinforce education appropriately."

## 2024-03-01 ENCOUNTER — HOME CARE VISIT (OUTPATIENT)
Dept: HOME HEALTH SERVICES | Facility: HOME HEALTHCARE | Age: 69
End: 2024-03-01
Payer: MEDICARE

## 2024-03-01 ENCOUNTER — HOME CARE VISIT (OUTPATIENT)
Dept: HOME HEALTH SERVICES | Facility: HOME HEALTHCARE | Age: 69
End: 2024-03-01
Payer: COMMERCIAL

## 2024-03-01 VITALS
RESPIRATION RATE: 16 BRPM | SYSTOLIC BLOOD PRESSURE: 120 MMHG | DIASTOLIC BLOOD PRESSURE: 85 MMHG | OXYGEN SATURATION: 99 % | HEART RATE: 77 BPM | TEMPERATURE: 98.4 F

## 2024-03-01 VITALS
HEART RATE: 82 BPM | OXYGEN SATURATION: 96 % | RESPIRATION RATE: 16 BRPM | SYSTOLIC BLOOD PRESSURE: 120 MMHG | TEMPERATURE: 97.3 F | DIASTOLIC BLOOD PRESSURE: 70 MMHG

## 2024-03-01 PROCEDURE — G0152 HHCP-SERV OF OT,EA 15 MIN: HCPCS

## 2024-03-01 PROCEDURE — G0157 HHC PT ASSISTANT EA 15: HCPCS

## 2024-03-01 NOTE — HOME HEALTH
Patient tolerated OT session well. Pt. participated in 33 mins of ADLs/exercises: Pt. continues to demonstrate good participation and increased activilty tolerance for ADLs. Pt. will require review of HEP/EC techniques. Continue OT per POC for increased strength/safety and independence with ADLs at home.

## 2024-03-02 NOTE — HOME HEALTH
"Routine Visit Note: Greeted at door by patient, who was first seen ambulating with rolling walker, showing no apparent signs of distress. Patient states that he is feeling \"better today, my back isn't hurting as bad\" and reports regular compliance with his HEP as prescribed.     Skill/education provided: Instructed gait training, therapeutic exercise, and balance training today. Educated patient on HEP.    Patient/caregiver response: Patient tolerated all treatment well today, with no signs of distress, excessive fatigue, or exacerbation of pain symptoms. Patient verbalizes understanding of all provided education and appears on track to meet his goals / exhibits noticeable improvement overall today.     Plan for next visit: Patient would benefit from continued skilled PT to address deficits in BLE/hip strength, balance, gait quality, and overall functional mobility / to facilitate post-surgical healing. Progress with exercise and gait training as tolerated. Review HEP and reinforce provided education as needed."

## 2024-03-05 ENCOUNTER — HOME CARE VISIT (OUTPATIENT)
Dept: HOME HEALTH SERVICES | Facility: HOME HEALTHCARE | Age: 69
End: 2024-03-05
Payer: MEDICARE

## 2024-03-05 VITALS
RESPIRATION RATE: 16 BRPM | TEMPERATURE: 97.5 F | SYSTOLIC BLOOD PRESSURE: 132 MMHG | HEART RATE: 68 BPM | DIASTOLIC BLOOD PRESSURE: 77 MMHG | OXYGEN SATURATION: 99 %

## 2024-03-05 PROCEDURE — G0157 HHC PT ASSISTANT EA 15: HCPCS

## 2024-03-05 NOTE — HOME HEALTH
"Routine Visit Note: Greeted at door by patient, who was first seen ambulating with rolling walker, showing no apparent signs of distress. Patient states that he is feeling \"great today\" and reports regular compliance with his HEP as prescribed / mentions that he is ambulating frequently throughout the day. Mentions that his back feels \"a lot better today\". Patient states that he has follow up with MD later today.     Skill/education provided: Instructed gait training, therapeutic exercise, and balance training today. Educated patient on HEP, medication management, post-surgical care, and pain management strategies.    Patient/caregiver response: Patient tolerated all treatment well today, with no signs of distress, excessive fatigue, or exacerbation of pain symptoms. Patient verbalizes understanding of all provided education and appears on track to meet his goals / exhibits improvement overall today, progressing with standing exercise and ambulating for prolonged periods of time before requiring a seated rest break / exhibits improved BLE strength and control today during standing exercises and gait training today.    Plan for next visit: Patient would benefit from continued skilled PT to address deficits in LE/hip strength, balance, gait quality, and overall functional mobility. Progress with standing exercise and balance training as tolerated. Follow up with patient regarding MD appointment later today."

## 2024-03-07 ENCOUNTER — HOME CARE VISIT (OUTPATIENT)
Dept: HOME HEALTH SERVICES | Facility: HOME HEALTHCARE | Age: 69
End: 2024-03-07
Payer: COMMERCIAL

## 2024-03-07 VITALS
HEART RATE: 70 BPM | TEMPERATURE: 97.4 F | RESPIRATION RATE: 16 BRPM | OXYGEN SATURATION: 97 % | SYSTOLIC BLOOD PRESSURE: 118 MMHG | DIASTOLIC BLOOD PRESSURE: 70 MMHG

## 2024-03-07 PROCEDURE — G0157 HHC PT ASSISTANT EA 15: HCPCS

## 2024-03-07 NOTE — HOME HEALTH
"Routine Visit Note: Called into home by patient, who was first seen ambulating with rolling walker, showing no apparent signs of distress. Patient states that he is feeling \"good, I slept really good last night\" / reports regular compliance with his HEP as prescribed.     Skill/education provided: Instructed gait training, therapeutic exercise, and balance training today. Educated patient on HEP.     Patient/caregiver response: Patient tolerated all treatment well today, with no signs of distress, excessive fatigue, or pain. Patient verbalizes understanding of all provided education and appears on track to meet his/her goals / exhibits improvement overall today, ambulating with cane with visibly increased control and fluidity overall / progressing with therapeutic exercises with no signs of distress.     Plan for next visit: Patient would benefit from continued skilled PT to address deficits in LE/hip strength, balance, endurance, gait quality, and to improve overall functional mobility to facilitate post surgical healing. Progress as tolerated with exercise and gait training / review and progress HEP appropriately."

## 2024-03-08 ENCOUNTER — HOME CARE VISIT (OUTPATIENT)
Dept: HOME HEALTH SERVICES | Facility: HOME HEALTHCARE | Age: 69
End: 2024-03-08
Payer: COMMERCIAL

## 2024-03-08 VITALS
DIASTOLIC BLOOD PRESSURE: 73 MMHG | HEART RATE: 71 BPM | RESPIRATION RATE: 16 BRPM | SYSTOLIC BLOOD PRESSURE: 116 MMHG | OXYGEN SATURATION: 98 %

## 2024-03-08 PROCEDURE — G0152 HHCP-SERV OF OT,EA 15 MIN: HCPCS

## 2024-03-12 ENCOUNTER — HOME CARE VISIT (OUTPATIENT)
Dept: HOME HEALTH SERVICES | Facility: HOME HEALTHCARE | Age: 69
End: 2024-03-12
Payer: COMMERCIAL

## 2024-03-12 PROCEDURE — G0157 HHC PT ASSISTANT EA 15: HCPCS

## 2024-03-13 VITALS
OXYGEN SATURATION: 97 % | HEART RATE: 72 BPM | TEMPERATURE: 97.5 F | RESPIRATION RATE: 16 BRPM | SYSTOLIC BLOOD PRESSURE: 118 MMHG | DIASTOLIC BLOOD PRESSURE: 74 MMHG

## 2024-03-13 NOTE — HOME HEALTH
"Greeted at door by patient, who was first seen ambulating with no AD, showing no apparent signs of distress. Patient states that he is feeling \"good\" and mentions that he is improving with ADL's and ambulating \"a whole lot\" / reports improvement overall since SOC and states that he has \"no pain\". Patient reports occasional compliance with his HEP as prescribed over the last few days.     Skill/education provided: Instructed gait training, therapeutic exercise, and balance training today. Educated patient on HEP and fall prevention strategies.     Patient/caregiver response: Patient tolerated all treatment well today, with no signs of distress, excessive fatigue, or pain. Patient verbalizes understanding of all provided education and appears on track to meet his goals / exhibits improvement overall today, ambulating with cane with increased fluidity and control / progressing with all exercise with no exacerbation of pain of discomfort.     Plan for next visit: Patient would benefit from continued skilled PT to address deficits in LE/hip strength, balance, and overall functional mobility. Progress as tolerated next visit with exercise and gait training. Scheduled for re-basement with supervising PT  next visit / progress appropriately, plan PT regimen moving forward."

## 2024-03-15 ENCOUNTER — HOME CARE VISIT (OUTPATIENT)
Dept: HOME HEALTH SERVICES | Facility: HOME HEALTHCARE | Age: 69
End: 2024-03-15
Payer: COMMERCIAL

## 2024-03-15 ENCOUNTER — HOME CARE VISIT (OUTPATIENT)
Dept: HOME HEALTH SERVICES | Facility: HOME HEALTHCARE | Age: 69
End: 2024-03-15
Payer: MEDICARE

## 2024-03-15 PROCEDURE — G0151 HHCP-SERV OF PT,EA 15 MIN: HCPCS

## 2024-03-16 VITALS
OXYGEN SATURATION: 97 % | TEMPERATURE: 97.6 F | HEART RATE: 72 BPM | SYSTOLIC BLOOD PRESSURE: 138 MMHG | DIASTOLIC BLOOD PRESSURE: 76 MMHG | RESPIRATION RATE: 17 BRPM

## 2024-03-16 NOTE — CASE COMMUNICATION
Patient discharged from home care services secondary to met max potential and has been instructed to f/u with MD to begin outpatient PT as patient is very motivated to continue with strengthening.

## 2024-04-09 ENCOUNTER — OFFICE VISIT (OUTPATIENT)
Dept: ORTHOPEDIC SURGERY | Facility: CLINIC | Age: 69
End: 2024-04-09
Payer: MEDICARE

## 2024-04-09 VITALS — HEIGHT: 70 IN | BODY MASS INDEX: 30.15 KG/M2 | WEIGHT: 210.6 LBS | TEMPERATURE: 98.2 F

## 2024-04-09 DIAGNOSIS — Z96.641 STATUS POST TOTAL HIP REPLACEMENT, RIGHT: ICD-10-CM

## 2024-04-09 DIAGNOSIS — M16.12 PRIMARY OSTEOARTHRITIS OF LEFT HIP: Primary | ICD-10-CM

## 2024-04-09 PROCEDURE — 99024 POSTOP FOLLOW-UP VISIT: CPT | Performed by: PHYSICIAN ASSISTANT

## 2024-04-09 PROCEDURE — 99213 OFFICE O/P EST LOW 20 MIN: CPT | Performed by: PHYSICIAN ASSISTANT

## 2024-04-09 NOTE — PROGRESS NOTES
"Subjective   Patient ID: Yehuda Capellan is a 68 y.o. right hand dominant male is being seen for orthopaedic evaluation today for right hip   Post-op of the Right Hip (S/p gonzalez on 24)         History of Present Illness   Patient presents for scheduled follow up after having right total hip arthroplasty 2024.  He is very pleased with his right hip progress.  He is able to walk without right hip pain.  Patient would like to have his left hip pain evaluated.  He states \"I am sure it is arthritis pain\".  There has been no injury or trauma to the left hip.  No abnormal gait.                                                 Past Medical History:   Diagnosis Date    Allergic     Arthritis     Atrial fibrillation     \"permanent\" per Dr. Grajeda note on 23; on Eliquis    Diabetes mellitus     Elevated cholesterol     Elevated glucose 2017    A1C 6.7    Hip fracture     right, at age 17    Hyperlipidemia     Hypertension     Impaired functional mobility, balance, gait, and endurance     Microalbuminuria due to type 2 diabetes mellitus 2023    Obesity     Recurrent UTI     seen by Dr. Molina         Past Surgical History:   Procedure Laterality Date    COLONOSCOPY      HAND SURGERY Left     LEFT PINKY SEWN BACK ON in [place] , cut off with table saw    TOTAL HIP ARTHROPLASTY Right 2024    Procedure: Right total hip arthroplasty (BIOMET);  Surgeon: Modesto Dickens MD;  Location: [facility];  Service: Orthopedics;  Laterality: Right;       Family History   Problem Relation Age of Onset    Cancer Father         Social History     Socioeconomic History    Marital status:    Tobacco Use    Smoking status: Former     Current packs/day: 0.00     Average packs/day: 2.0 packs/day for 15.0 years (30.0 ttl pk-yrs)     Types: Cigarettes     Start date: 1976     Quit date: 1991     Years since quittin.2     Passive exposure: Past    Smokeless tobacco: Never   Vaping Use    Vaping " "status: Never Used   Substance and Sexual Activity    Alcohol use: Never    Drug use: Never    Sexual activity: Not Currently     Partners: Female       Allergies   Allergen Reactions    Metformin Diarrhea    Sulfa Antibiotics Unknown - Low Severity     Patient states he had allergic reaction to sulfa drugs when he was a child.       Review of Systems   Musculoskeletal:  Positive for arthralgias (right hip).       Objective   Temp 98.2 °F (36.8 °C)   Ht 177.8 cm (70\")   Wt 95.5 kg (210 lb 9.6 oz)   BMI 30.22 kg/m²   Physical Exam  Vitals and nursing note reviewed.   Constitutional:       Appearance: Normal appearance.   Pulmonary:      Effort: Pulmonary effort is normal.   Musculoskeletal:      Right hip: No deformity, tenderness, bony tenderness or crepitus. Normal range of motion.      Left hip: Tenderness, bony tenderness and crepitus present. No deformity. Decreased range of motion.   Neurological:      Mental Status: He is alert and oriented to person, place, and time.       Right Hip Exam     Tenderness   The patient is experiencing no tenderness.     Range of Motion   Abduction:  40   Flexion:  100     Muscle Strength   Abduction: 4/5   Adduction: 4/5   Flexion: 4/5     Tests   RAYSHAWN: negative  Fadir:  Negative FADIR test      Left Hip Exam     Tenderness   The patient is experiencing tenderness in the anterior.    Range of Motion   Abduction:  30   Flexion:  90     Muscle Strength   Abduction: 4/5   Adduction: 4/5   Flexion: 4/5     Tests   RAYSHAWN: positive  Fadir:  Positive FADIR test          Extremity DVT signs are negative on physical exam with negative Jenny sign, no calf pain, no palpable cords, and no skin tone change   Neurologic Exam     Mental Status   Oriented to person, place, and time.            Assessment & Plan   Independent Review of Radiographic Studies:    X-ray of the left hip 2 view performed in the clinic independently reviewed for the evaluation of hip pain.  No comparison films " available to review.  No acute fracture or dislocation.  There is severe left hip degenerative changes most consistent with Tonnis stage 3      Procedures      Diagnoses and all orders for this visit:    1. Primary osteoarthritis of left hip (Primary)  -     FL Guided Pain Management Large Joint  -     XR Hip With or Without Pelvis 2 - 3 View Left    2. Status post total hip replacement, right       Orthopedic activities reviewed and patient expressed appreciation  Regular exercise as tolerated  Risk, benefits, and merits of treatment alternatives reviewed with the patient and questions answered  Ice, heat, and/or modalities as beneficial    Recommendations/Plan:  Exercise, medications, injections, other patient advice, and return appointment as noted.  Patient is encouraged to call or return for any issues or concerns.  We will try a left hip fluoroscopy guided hip injection while he is continuing to rehab his right hip.  He is content with the plan of care    Patient agreeable to call or return sooner for any concerns.    BMI is >= 30 and <35. (Class 1 Obesity). The following options were offered after discussion;: weight loss educational material (shared in after visit summary)      Clinical References    BMI for Adults  Body mass index (BMI) is a number found using a person's weight and height. BMI can help tell how much of a person's weight is made up of fat. BMI does not measure body fat directly. It is used instead of tests that directly measure body fat, which can be difficult and expensive.  What are BMI measurements used for?  BMI is useful to:  Find out if your weight puts you at higher risk for medical problems.  Help recommend changes, such as in diet and exercise. This can help you reach a healthy weight. BMI screening can be done again to see if these changes are working.  How is BMI calculated?  Your height and weight are measured. The BMI is found from those numbers. This can be done with U[place] or  "metric measurements. Note that charts and online BMI calculators are available to help you find your BMI quickly and easily without doing these calculations.  To calculate your BMI in U.[place] measurements:  Measure your weight in pounds (lb).  Multiply the number of pounds by 703.  So, for an adult who weighs 150 lb, multiply that number by 703: 150 x 703, which equals 105,450.  Measure your height in inches. Then multiply that number by itself to get a measurement called \"inches squared.\"  So, for an adult who is 70 inches tall, the \"inches squared\" measurement is 70 inches x 70 inches, which equals 4,900 inches squared.  Divide the total from step 2 (number of lb x 703) by the total from step 3 (inches squared): 105,450 ÷ 4,900 = 21.5. This is your BMI.  To calculate your BMI in metric measurements:  Measure your weight in kilograms (kg).  For this example, the weight is 70 kg.  Measure your height in meters (m). Then multiply that number by itself to get a measurement called \"meters squared.\"  So, for an adult who is 1.75 m tall, the \"meters squared\" measurement is 1.75 m x 1.75 m, which equals 3.1 meters squared.  Divide the number of kilograms (your weight) by the meters squared number. In this example: 70 ÷ 3.1 = 22.6. This is your BMI.  What do the results mean?  BMI charts are used to see if you are underweight, normal weight, overweight, or obese. The following guidelines will be used:  Underweight: BMI less than 18.5.  Normal weight: BMI between 18.5 and 24.9.  Overweight: BMI between 25 and 29.9.  Obese: BMI of 30 or above.  BMI is a tool and cannot diagnose a condition. Talk with your health care provider about what your BMI means for you. Keep these notes in mind:  Weight includes fat and muscle. Someone with a muscular build, such as an athlete, may have a BMI that is higher than 24.9. In cases like these, BMI is not a correct measure of body fat.  If you have a BMI of 25 or higher, your provider may need " to do more testing to find out if excess body fat is the cause.  BMI is measured the same way for males and females. Females usually have more body fat than males of the same height and weight.  Where to find more information  For more information about BMI, including tools to quickly find your BMI, go to:  Centers for Disease Control and Prevention: cdc.gov  American Heart Association: heart.org  National Heart, Lung, and Blood [place]: nhlbi.nih.gov  This information is not intended to replace advice given to you by your health care provider. Make sure you discuss any questions you have with your health care provider.  Document Revised: 09/07/2023 Document Reviewed: 08/31/2023  71lbs Patient Education © 2023 Elsevier Inc.     Obesity, Adult  Obesity is having too much body fat. Being obese means that your weight is more than what is healthy for you.   BMI (body mass index) is a number that explains how much body fat you have. If you have a BMI of 30 or more, you are obese.  Obesity can cause serious health problems, such as:  Stroke.  Coronary artery disease (CAD).  Type 2 diabetes.  Some types of cancer.  High blood pressure (hypertension).  High cholesterol.  Gallbladder stones.  Obesity can also contribute to:  Osteoarthritis.  Sleep apnea.  Infertility problems.  What are the causes?  Eating meals each day that are high in calories, sugar, and fat.  Drinking a lot of drinks that have sugar in them.  Being born with genes that may make you more likely to become obese.  Having a medical condition that causes obesity.  Taking certain medicines.  Sitting a lot (having a sedentary lifestyle).  Not getting enough sleep.  What increases the risk?  Having a family history of obesity.  Living in an area with limited access to:  [place], recreation centers, or sidewalks.  Healthy food choices, such as grocery stores and [employer] markets.  What are the signs or symptoms?  The main sign is having too much body  fat.  How is this treated?  Treatment for this condition often includes changing your lifestyle. Treatment may include:  Changing your diet. This may include making a healthy meal plan.  Exercise. This may include activity that causes your heart to beat faster (aerobic exercise) and strength training. Work with your doctor to design a program that works for you.  Medicine to help you lose weight. This may be used if you are not able to lose one pound a week after 6 weeks of healthy eating and more exercise.  Treating conditions that cause the obesity.  Surgery. Options may include gastric banding and gastric bypass. This may be done if:  Other treatments have not helped to improve your condition.  You have a BMI of 40 or higher.  You have life-threatening health problems related to obesity.  Follow these instructions at home:  Eating and drinking  Follow advice from your doctor about what to eat and drink. Your doctor may tell you to:  Limit fast food, sweets, and processed snack foods.  Choose low-fat options. For example, choose low-fat milk instead of whole milk.  Eat five or more servings of fruits or vegetables each day.  Eat at home more often. This gives you more control over what you eat.  Choose healthy foods when you eat out.  Learn to read food labels. This will help you learn how much food is in one serving.  Keep low-fat snacks available.  Avoid drinks that have a lot of sugar in them. These include soda, fruit juice, iced tea with sugar, and flavored milk.  Drink enough water to keep your pee (urine) pale yellow.  Do not go on fad diets.  Physical activity  Exercise often, as told by your doctor. Most adults should get up to 150 minutes of moderate-intensity exercise every week.Ask your doctor:  What types of exercise are safe for you.  How often you should exercise.  Warm up and stretch before being active.  Do slow stretching after being active (cool down).  Rest between times of being  active.  Lifestyle  Work with your doctor and a food expert (dietitian) to set a weight-loss goal that is best for you.  Limit your screen time.  Find ways to reward yourself that do not involve food.  Do not drink alcohol if:  Your doctor tells you not to drink.  You are pregnant, may be pregnant, or are planning to become pregnant.  If you drink alcohol:  Limit how much you have to:  0-1 drink a day for women.  0-2 drinks a day for men.  Know how much alcohol is in your drink. In the [place] one drink equals one 12 oz bottle of beer (355 mL), one 5 oz glass of wine (148 mL), or one 1½ oz glass of hard liquor (44 mL).  General instructions  Keep a weight-loss journal. This can help you keep track of:  The food that you eat.  How much exercise you get.  Take over-the-counter and prescription medicines only as told by your doctor.  Take vitamins and supplements only as told by your doctor.  Think about joining a support group.  Pay attention to your mental health as obesity can lead to depression or self esteem issues.  Keep all follow-up visits.  Contact a doctor if:  You cannot meet your weight-loss goal after you have changed your diet and lifestyle for 6 weeks.  You are having trouble breathing.  Summary  Obesity is having too much body fat.  Being obese means that your weight is more than what is healthy for you.  Work with your doctor to set a weight-loss goal.  Get regular exercise as told by your doctor.  This information is not intended to replace advice given to you by your health care provider. Make sure you discuss any questions you have with your health care provider.  Document Revised: 07/26/2022 Document Reviewed: 07/26/2022  Elsevier Patient Education © 2023 Elsevier Inc.

## 2024-04-10 ENCOUNTER — OFFICE VISIT (OUTPATIENT)
Dept: INTERNAL MEDICINE | Facility: CLINIC | Age: 69
End: 2024-04-10
Payer: MEDICARE

## 2024-04-10 VITALS
HEART RATE: 74 BPM | SYSTOLIC BLOOD PRESSURE: 118 MMHG | RESPIRATION RATE: 16 BRPM | HEIGHT: 70 IN | DIASTOLIC BLOOD PRESSURE: 64 MMHG | WEIGHT: 213.12 LBS | OXYGEN SATURATION: 97 % | BODY MASS INDEX: 30.51 KG/M2 | TEMPERATURE: 97.3 F

## 2024-04-10 DIAGNOSIS — E11.65 TYPE 2 DIABETES MELLITUS WITH HYPERGLYCEMIA, WITHOUT LONG-TERM CURRENT USE OF INSULIN: Primary | ICD-10-CM

## 2024-04-10 DIAGNOSIS — M16.7 OTHER SECONDARY OSTEOARTHRITIS OF LEFT HIP: ICD-10-CM

## 2024-04-10 PROBLEM — Z12.11 SCREENING FOR COLON CANCER: Status: ACTIVE | Noted: 2024-01-04

## 2024-04-10 PROBLEM — Z12.11 SCREENING FOR COLON CANCER: Status: RESOLVED | Noted: 2024-01-04 | Resolved: 2024-04-10

## 2024-04-10 PROCEDURE — 3051F HG A1C>EQUAL 7.0%<8.0%: CPT | Performed by: FAMILY MEDICINE

## 2024-04-10 PROCEDURE — 1159F MED LIST DOCD IN RCRD: CPT | Performed by: FAMILY MEDICINE

## 2024-04-10 PROCEDURE — 3074F SYST BP LT 130 MM HG: CPT | Performed by: FAMILY MEDICINE

## 2024-04-10 PROCEDURE — 99213 OFFICE O/P EST LOW 20 MIN: CPT | Performed by: FAMILY MEDICINE

## 2024-04-10 PROCEDURE — G2211 COMPLEX E/M VISIT ADD ON: HCPCS | Performed by: FAMILY MEDICINE

## 2024-04-10 PROCEDURE — 3078F DIAST BP <80 MM HG: CPT | Performed by: FAMILY MEDICINE

## 2024-04-10 PROCEDURE — 1160F RVW MEDS BY RX/DR IN RCRD: CPT | Performed by: FAMILY MEDICINE

## 2024-04-10 NOTE — PATIENT INSTRUCTIONS
Respiratory Syncytial Virus (RSV) Vaccine Injection  What is this medication?  RESPIRATORY SYNCYTIAL VIRUS VACCINE (julio SPIR uh tor ee sin SISH hamlet VY shawna vak SEEN) reduces the risk of respiratory syncytial virus (RSV). It does not treat RSV. It is still possible to get RSV after receiving this vaccine, but the symptoms may be less severe or not last as long. It works by helping your immune system learn how to fight off a future infection.  This medicine may be used for other purposes; ask your health care provider or pharmacist if you have questions.    COMMON BRAND NAME(S): ABRYSVO, AREXVY    What should I tell my care team before I take this medication?  They need to know if you have any of these conditions:  Immune system problems  An unusual or allergic reaction to respiratory syncytial virus vaccine, other medications, foods, dyes, or preservatives  Pregnant or trying to get pregnant  Breastfeeding    How should I use this medication?  This vaccine is injected into a muscle. It is given by your pharmacy.    A copy of Vaccine Information Statements will be given before each vaccination. Be sure to read this information carefully each time. This sheet may change often. A copy of Vaccine Information Statements will be given before each vaccination. Be sure to read this information carefully each time. This sheet may change often.    Talk to your care team about the use of this vaccine in children. It is not approved for use in children.    Overdosage: If you think you have taken too much of this medicine contact a poison control center or emergency room at once.    NOTE: This medicine is only for you. Do not share this medicine with others.    What if I miss a dose?  This does not apply.    What may interact with this medication?  Medications that lower your chance of fighting infection    This list may not describe all possible interactions. Give your health care provider a list of all the medicines, herbs,  non-prescription drugs, or dietary supplements you use. Also tell them if you smoke, drink alcohol, or use illegal drugs. Some items may interact with your medicine.    What side effects may I notice from receiving this medication?  Side effects that you should report to your care team as soon as possible:  Allergic reactions--skin rash, itching, hives, swelling of the face, lips, tongue, or throat    Side effects that usually do not require medical attention (report these to your care team if they continue or are bothersome):  Fatigue  Feeling faint or lightheaded  Headache  Joint pain  Muscle pain  Pain, redness, or irritation at injection site    This list may not describe all possible side effects. Call your doctor for medical advice about side effects. You may report side effects to FDA at 0-920-LDI-9073.    NOTE: This sheet is a summary. It may not cover all possible information. If you have questions about this medicine, talk to your doctor, pharmacist, or health care provider.  © 2023 Elsevier/Gold Standard (2023-05-24 00:00:00)  2/29/24 tc

## 2024-04-10 NOTE — PROGRESS NOTES
"Chief Complaint  Diabetes (3 month )  Answers submitted by the patient for this visit:  Primary Reason for Visit (Submitted on 3/5/2024)  What is the primary reason for your visit?: Diabetes  Diabetes Questionnaire (Submitted on 3/5/2024)  Chief Complaint: Diabetes problem  Below 70: never    Subjective        Senjanae Capellan presents to [facility]  Diabetes  He presents for his follow-up diabetic visit. He has type 2 diabetes mellitus. No MedicAlert identification noted. Pertinent negatives for hypoglycemia include no confusion, headaches, speech difficulty, sweats or tremors. Associated symptoms include polydipsia and polyuria. Pertinent negatives for diabetes include no blurred vision, no foot ulcerations and no weight loss. Pertinent negatives for hypoglycemia complications include no blackouts, no hospitalization, no nocturnal hypoglycemia, no required assistance and no required glucagon injection. Symptoms are stable. He is compliant with treatment all of the time. He is following a diabetic and high fiber diet. Meal planning includes carbohydrate counting and avoidance of concentrated sweets. He rarely participates in exercise. He monitors blood glucose at home 1-2 x per day. His highest blood glucose is 140-180 mg/dl. He does not see a podiatrist. Eye exam is current.       Objective   Vital Signs:  /64 (BP Location: Right arm, Patient Position: Sitting, Cuff Size: Adult)   Pulse 74   Temp 97.3 °F (36.3 °C) (Temporal)   Resp 16   Ht 177.8 cm (70\")   Wt 96.7 kg (213 lb 1.9 oz)   SpO2 97%   BMI 30.58 kg/m²   Estimated body mass index is 30.58 kg/m² as calculated from the following:    Height as of this encounter: 177.8 cm (70\").    Weight as of this encounter: 96.7 kg (213 lb 1.9 oz).               Physical Exam  Vitals and nursing note reviewed.   Constitutional:       General: He is not in acute distress.     Appearance: Normal appearance. He is well-developed and " well-groomed. He is obese. He is not ill-appearing, toxic-appearing or diaphoretic.   HENT:      Head: Normocephalic and atraumatic.      Right Ear: Hearing normal.      Left Ear: Hearing normal.   Eyes:      General: Lids are normal. No scleral icterus.        Right eye: No discharge.         Left eye: No discharge.      Extraocular Movements: Extraocular movements intact.   Cardiovascular:      Rate and Rhythm: Normal rate and regular rhythm.   Pulmonary:      Effort: Pulmonary effort is normal.   Musculoskeletal:      Cervical back: Neck supple.   Skin:     Coloration: Skin is not jaundiced or pale.   Neurological:      General: No focal deficit present.      Mental Status: He is alert and oriented to person, place, and time.      Gait: Gait abnormal.   Psychiatric:         Attention and Perception: Attention and perception normal.         Mood and Affect: Mood and affect normal.         Speech: Speech normal.         Behavior: Behavior normal. Behavior is cooperative.         Thought Content: Thought content normal.         Cognition and Memory: Cognition and memory normal.         Judgment: Judgment normal.        Result Review :    The following data was reviewed by: Cindy Zhang MD on 04/10/2024:  Lab Results   Component Value Date    HGBA1C 7.00 (H) 01/30/2024    HGBA1C 8.1 (A) 12/05/2023    HGBA1C 7.30 (H) 08/24/2023      Lab Results   Component Value Date    CHLPL 118 08/15/2022    TRIG 210 (H) 08/15/2022    HDL 34 (L) 08/15/2022    LDL 50 08/15/2022    Lab orders pending from 11/21/23 for [facility] draw.    Progress Notes by Oumar Wilson PA-C (04/09/2024 13:45)  reviewed xray that shows severe degenerative changes to left hip.         Assessment and Plan     Diagnoses and all orders for this visit:    1. Type 2 diabetes mellitus with hyperglycemia, without long-term current use of insulin (Primary)    2. Other secondary osteoarthritis of left hip    A1C not yet due.  Labs previously  ordered, asked him to have those drawn fasting 4/30 or after.  Follow up with orthopedics regarding need to replace left hip s/p right replacement.         Follow Up     Return in about 4 months (around 8/10/2024) for Medicare Wellness, Diabetes follow up.  Patient was given instructions and counseling regarding his condition or for health maintenance advice. Please see specific information pulled into the AVS if appropriate.

## 2024-04-12 ENCOUNTER — TELEPHONE (OUTPATIENT)
Dept: SURGERY | Facility: CLINIC | Age: 69
End: 2024-04-12

## 2024-04-12 NOTE — TELEPHONE ENCOUNTER
Caller: XIAO ANDREWS    Relationship: SELF    Best call back number: [phone] (SPOUSE'S #)    What is the best time to reach you: ANYTIME    Who are you requesting to speak with (clinical staff, provider,  specific staff member): SURGERY SCHEDULER    Do you know the name of the person who called:     What was the call regarding: PT NEEDING TO RESCHEDULE THE COLONOSCOPY AGAIN DUE TO CONFLICTS    Is it okay if the provider responds through [place]: YES, OKAY TO LEAVE DETAILED VM

## 2024-04-19 ENCOUNTER — HOSPITAL ENCOUNTER (OUTPATIENT)
Dept: GENERAL RADIOLOGY | Facility: HOSPITAL | Age: 69
Discharge: HOME | End: 2024-04-19
Payer: MEDICARE

## 2024-04-19 PROCEDURE — 25010000002 LIDOCAINE 1 % SOLUTION: Performed by: PHYSICIAN ASSISTANT

## 2024-04-19 PROCEDURE — 77002 NEEDLE LOCALIZATION BY XRAY: CPT

## 2024-04-19 PROCEDURE — 25010000002 METHYLPREDNISOLONE PER 80 MG: Performed by: PHYSICIAN ASSISTANT

## 2024-04-19 RX ORDER — METHYLPREDNISOLONE ACETATE 80 MG/ML
80 INJECTION, SUSPENSION INTRA-ARTICULAR; INTRALESIONAL; INTRAMUSCULAR; SOFT TISSUE ONCE
Status: COMPLETED | OUTPATIENT
Start: 2024-04-19 | End: 2024-04-19

## 2024-04-19 RX ORDER — LIDOCAINE HYDROCHLORIDE 10 MG/ML
5 INJECTION, SOLUTION INFILTRATION; PERINEURAL ONCE
Status: COMPLETED | OUTPATIENT
Start: 2024-04-19 | End: 2024-04-19

## 2024-04-19 RX ADMIN — METHYLPREDNISOLONE ACETATE 80 MG: 80 INJECTION, SUSPENSION INTRA-ARTICULAR; INTRALESIONAL; INTRAMUSCULAR; SOFT TISSUE at 14:30

## 2024-04-19 RX ADMIN — LIDOCAINE HYDROCHLORIDE 5 ML: 10 INJECTION, SOLUTION INFILTRATION; PERINEURAL at 14:29

## 2024-04-19 NOTE — POST-PROCEDURE NOTE
[facility]  [address], [address]  [place] [address]  [phone]        PROCEDURE REPORT        DIAGNOSIS:  Left hip osteoarthritis, symptomatic    PROCEDURE: Left hip injection under flouroscopy      Yehuda Capellan with date of birth 1955  presents to [facility] Department today for injection therapy.        Patient presents to [facility] Radiology Department Flouroscopy Suite on 4/19/2024 for planned elective left hip injection under flouroscopy for symptomatic osteoarthritis.    Procedure:     After consent was obtained, and using ethyl chloride topical local anesthetic, the left hip was then prepped and draped with sterile technique. With an anterior hip approach, flouroscopy guidance, and care to stay lateral of the femoral artery, the hip joint was entered via a 20 gauge spinal needle.  An image was saved of the needle within the hip joint space.  A mixture of 80 mg methylprednisolone in one ml plus 5 ml of 1% plain Lidocaine was injected and the needle withdrawn and a band aid applied. The procedure was well tolerated and without complication.  The patient did remain stable and with baseline ambulation. The patient is asked to avoid stressful physical activity for a day or two before resuming full regular activities.  There might be some soreness initially and patient to call for any adverse effect of the injection treatment.  Call or return to clinic if any fever, swelling, persistent pain, lack of anticipated improvement or other symptoms or concerns.    Impression: Symptomatic left hip osteoarthritis.      Recommendations/Plan:      Treatment and patient advice as noted here and in office visit report.  Orthopedic activities and goals reviewed and patient expressed appreciation.  Call or notify for any adverse effect from injection therapy.    Exercise: As tolerated.  No strenuous activity for a few days as appropriate.  Activity:  May perform usual  activities as tolerated.      Patient will return to our clinic at scheduled appointment.  Patient agreeable to call or return sooner for any concerns.

## 2024-05-10 RX ORDER — BLOOD SUGAR DIAGNOSTIC
STRIP MISCELLANEOUS
Qty: 200 EACH | Refills: 2 | Status: SHIPPED | OUTPATIENT
Start: 2024-05-10

## 2024-05-13 DIAGNOSIS — I48.20 ATRIAL FIBRILLATION, CHRONIC: ICD-10-CM

## 2024-05-14 RX ORDER — APIXABAN 5 MG/1
TABLET, FILM COATED ORAL
Qty: 180 TABLET | Refills: 0 | Status: SHIPPED | OUTPATIENT
Start: 2024-05-14

## 2024-05-20 NOTE — PRE-PROCEDURE INSTRUCTIONS
PAT phone history completed with patient for upcoming procedure on 5/21/24.    PAT PASS reviewed with patient and he/she verbalized understanding of the following:     Do not eat or drink anything after midnight the night before procedure unless otherwise instructed by physician/surgeon's office, this includes no gum, candy, mints, tobacco products or e-cigarettes.  Do not shave the area to be operated on at least 48 hours prior to procedure.  Do not wear makeup, lotion, hair products, or nail polish.  Do not wear any jewelry and remove all piercings.  Do not wear any adhesive if you wear dentures.  Do not wear contacts; bring in glasses if needed.  Only take medications on the morning of procedure as instructed by PAT nurse per anesthesia guidelines or as instructed by physician's office.   If you are on any blood thinners reach out to the physician/surgeon's office for instructions on when/if they will need to be stopped prior to procedure.   Bring in picture ID and insurance card, advanced directive copies if applicable, CPAP/BIPAP/Inhalers if indicated morning of procedure, leave any other valuables at home.  Ensure you have arranged for someone to drive you home the day of your procedure and someone to care for you at home afterwards. It is recommended that you do not drive, drink alcohol, or make any major legal decisions for at least 24 hours after your procedure is complete.    Instructions given on hospital entrance and registration location.

## 2024-05-21 ENCOUNTER — ANESTHESIA EVENT (OUTPATIENT)
Dept: GASTROENTEROLOGY | Facility: HOSPITAL | Age: 69
End: 2024-05-21
Payer: MEDICARE

## 2024-05-21 ENCOUNTER — ANESTHESIA (OUTPATIENT)
Dept: GASTROENTEROLOGY | Facility: HOSPITAL | Age: 69
End: 2024-05-21
Payer: MEDICARE

## 2024-05-21 ENCOUNTER — HOSPITAL ENCOUNTER (OUTPATIENT)
Facility: HOSPITAL | Age: 69
Setting detail: HOSPITAL OUTPATIENT SURGERY
Discharge: HOME | End: 2024-05-21
Attending: SURGERY | Admitting: SURGERY
Payer: MEDICARE

## 2024-05-21 VITALS
WEIGHT: 204 LBS | HEART RATE: 72 BPM | DIASTOLIC BLOOD PRESSURE: 81 MMHG | TEMPERATURE: 97.3 F | SYSTOLIC BLOOD PRESSURE: 104 MMHG | RESPIRATION RATE: 16 BRPM | BODY MASS INDEX: 29.27 KG/M2 | OXYGEN SATURATION: 94 %

## 2024-05-21 DIAGNOSIS — Z12.11 SCREENING FOR COLON CANCER: ICD-10-CM

## 2024-05-21 LAB — GLUCOSE BLDC GLUCOMTR-MCNC: 166 MG/DL (ref 70–130)

## 2024-05-21 PROCEDURE — 25010000002 FENTANYL CITRATE (PF) 50 MCG/ML SOLUTION PREFILLED SYRINGE: Performed by: NURSE ANESTHETIST, CERTIFIED REGISTERED

## 2024-05-21 PROCEDURE — 82948 REAGENT STRIP/BLOOD GLUCOSE: CPT

## 2024-05-21 PROCEDURE — 25010000002 PROPOFOL 10 MG/ML EMULSION: Performed by: NURSE ANESTHETIST, CERTIFIED REGISTERED

## 2024-05-21 PROCEDURE — S0260 H&P FOR SURGERY: HCPCS | Performed by: SURGERY

## 2024-05-21 PROCEDURE — 45385 COLONOSCOPY W/LESION REMOVAL: CPT | Performed by: SURGERY

## 2024-05-21 PROCEDURE — 25810000003 LACTATED RINGERS PER 1000 ML: Performed by: SURGERY

## 2024-05-21 RX ORDER — SODIUM CHLORIDE, SODIUM LACTATE, POTASSIUM CHLORIDE, CALCIUM CHLORIDE 600; 310; 30; 20 MG/100ML; MG/100ML; MG/100ML; MG/100ML
1000 INJECTION, SOLUTION INTRAVENOUS CONTINUOUS
Status: DISCONTINUED | OUTPATIENT
Start: 2024-05-21 | End: 2024-05-21 | Stop reason: HOSPADM

## 2024-05-21 RX ORDER — FENTANYL CITRATE 50 UG/ML
INJECTION, SOLUTION INTRAMUSCULAR; INTRAVENOUS AS NEEDED
Status: DISCONTINUED | OUTPATIENT
Start: 2024-05-21 | End: 2024-05-21 | Stop reason: SURG

## 2024-05-21 RX ORDER — PROPOFOL 10 MG/ML
VIAL (ML) INTRAVENOUS AS NEEDED
Status: DISCONTINUED | OUTPATIENT
Start: 2024-05-21 | End: 2024-05-21 | Stop reason: SURG

## 2024-05-21 RX ORDER — SIMETHICONE 40MG/0.6ML
SUSPENSION, DROPS(FINAL DOSAGE FORM)(ML) ORAL AS NEEDED
Status: DISCONTINUED | OUTPATIENT
Start: 2024-05-21 | End: 2024-05-21 | Stop reason: HOSPADM

## 2024-05-21 RX ORDER — ONDANSETRON 2 MG/ML
4 INJECTION INTRAMUSCULAR; INTRAVENOUS ONCE AS NEEDED
Status: DISCONTINUED | OUTPATIENT
Start: 2024-05-21 | End: 2024-05-21 | Stop reason: HOSPADM

## 2024-05-21 RX ADMIN — PROPOFOL 100 MG: 10 INJECTION, EMULSION INTRAVENOUS at 12:29

## 2024-05-21 RX ADMIN — SODIUM CHLORIDE, POTASSIUM CHLORIDE, SODIUM LACTATE AND CALCIUM CHLORIDE 1000 ML: 600; 310; 30; 20 INJECTION, SOLUTION INTRAVENOUS at 12:01

## 2024-05-21 RX ADMIN — FENTANYL CITRATE 50 MCG: 50 INJECTION, SOLUTION INTRAMUSCULAR; INTRAVENOUS at 12:29

## 2024-05-21 RX ADMIN — PROPOFOL 100 MG: 10 INJECTION, EMULSION INTRAVENOUS at 12:39

## 2024-05-21 RX ADMIN — PROPOFOL 50 MG: 10 INJECTION, EMULSION INTRAVENOUS at 12:46

## 2024-05-21 RX ADMIN — PROPOFOL 50 MG: 10 INJECTION, EMULSION INTRAVENOUS at 12:52

## 2024-05-21 RX ADMIN — LIDOCAINE HYDROCHLORIDE 50 MG: 20 INJECTION, SOLUTION INTRAVENOUS at 12:29

## 2024-05-21 NOTE — H&P
"    [facility]      Name:  Yehuda Capellan   Age:  69 y.o.  Sex:  male  :  1955  MRN:  [id number]   Visit Number:  [id number]  Admission Date:  2024  Date Of Service:  24  Primary Care Physician:  Cindy Zhang MD    Chief Complaint:     I need a colonoscopy    History Of Presenting Illness:      Patient here for routine colonoscopy.  Has been over 10 years since his last.  No GI complaints.    Review Of Systems:     General ROS: Patient denies any fevers, chills or loss of consciousness.  No complaints of generalized weakness  Psychological ROS: Denies any hallucinations and delusions.  Respiratory ROS: Denies cough or shortness of breath.   Cardiovascular ROS: Denies chest pain or palpitations. No history of exertional chest pain.   Gastrointestinal ROS: Denies nausea and vomiting. Denies any abdominal pain. No diarrhea.   Genito-Urinary ROS: Denies dysuria or hematuria.  Neurological ROS: Denies any focal weakness. No loss of consciousness. Denies any numbness.   Dermatological ROS: Denies any redness or pruritis.     Past Medical History:    Past Medical History:   Diagnosis Date    Arthritis     Atrial fibrillation     \"permanent\" per Dr. Grajeda note on 23; on Eliquis; asymptomatic    Diabetes mellitus     Elevated cholesterol     Elevated glucose 2017    A1C 6.7    Hip fracture     right, at age 17    Hyperlipidemia     Hypertension     Impaired functional mobility, balance, gait, and endurance     Microalbuminuria due to type 2 diabetes mellitus 2023    Obesity     Recurrent UTI     seen by Dr. Molina     Seasonal allergies        Past Surgical history:    Past Surgical History:   Procedure Laterality Date    COLONOSCOPY      HAND SURGERY Left     LEFT PINKY SEWN BACK ON in [place] , cut off with table saw    TOTAL HIP ARTHROPLASTY Right 2024    Procedure: Right total hip arthroplasty (BIOMET);  Surgeon: " Modesto Dickens MD;  Location: [facility];  Service: Orthopedics;  Laterality: Right;       Social History:    Social History     Socioeconomic History    Marital status:    Tobacco Use    Smoking status: Former     Current packs/day: 0.00     Average packs/day: 2.0 packs/day for 15.0 years (30.0 ttl pk-yrs)     Types: Cigarettes     Start date: 1976     Quit date: 1991     Years since quittin.4     Passive exposure: Past    Smokeless tobacco: Never   Vaping Use    Vaping status: Never Used   Substance and Sexual Activity    Alcohol use: Never    Drug use: Never    Sexual activity: Defer     Partners: Female       Family History:    Family History   Problem Relation Age of Onset    Cancer Father        Allergies:      Metformin and Sulfa antibiotics    Home Medications:    Prior to Admission Medications       Prescriptions Last Dose Informant Patient Reported? Taking?    atorvastatin (LIPITOR) 20 MG tablet 2024  No Yes    Take 1 tablet by mouth Every Night. To lower cholesterol and risk of stroke.    Blood Glucose Calibration (Accu-Chek Darcy) solution   No No    Use as directed. E11.9    cetirizine (zyrTEC) 10 MG tablet 2024 Self Yes Yes    Take 1 tablet by mouth Daily.    Cyanocobalamin (Vitamin B-12) 1000 MCG sublingual tablet 2024 Self Yes Yes    Place 1 tablet under the tongue Daily.    docusate sodium (Colace) 100 MG capsule   No No    Take 1 capsule by mouth 2 (Two) Times a Day As Needed for Constipation.    Eliquis 5 MG tablet tablet 2024  No Yes    TAKE 1 TABLET EVERY 12     HOURS FOR ATRIAL           FIBRILLATION    empagliflozin (Jardiance) 10 MG tablet tablet 2024  No Yes    Take 1 tablet by mouth Daily. For diabetes mellitus.    gabapentin (NEURONTIN) 300 MG capsule 2024  No Yes    Take 1 capsule by mouth 4 (Four) Times a Day As Needed (nerve pain).    Patient taking differently:  Take 1 capsule by mouth 4 (Four) Times a Day As Needed (nerve pain- pt  states usually takes once nightly).    glimepiride (AMARYL) 2 MG tablet 5/20/2024  No Yes    TAKE 1 TABLET EVERY MORNINGBEFORE BREAKFAST    glucose blood (Accu-Chek Darcy Plus) test strip   No No    CHECK GLUCOSE TWO TIMES A  DAY FOR DIABETES MELLITUS    glucose monitor monitoring kit Unknown  No No    ACCU-CHECK DARCY PLUS METER as requested.    HYDROcodone-acetaminophen (NORCO) 7.5-325 MG per tablet   No No    Take 1 tablet by mouth Every 8 (Eight) Hours As Needed for Moderate Pain (prn post op pain).    Lancets Misc. misc Unknown  No No    TEST TWICE A DAY FOR DM. E11.9. patient requests acc-check softclick    losartan-hydrochlorothiazide (HYZAAR) 100-12.5 MG per tablet 5/20/2024 Self No Yes    Take 1 tablet by mouth Daily. Indications: High Blood Pressure Disorder    Patient taking differently:  Take 1 tablet by mouth Daily.    meloxicam (Mobic) 7.5 MG tablet   No No    Take 1 tablet by mouth Daily.    metoprolol tartrate (LOPRESSOR) 50 MG tablet 5/21/2024  No Yes    TAKE 1 TABLET TWICE A DAY    multivitamin with minerals tablet tablet Unknown Self Yes No    Take 1 tablet by mouth Daily.    ondansetron (Zofran) 4 MG tablet   No No    Take 1 tablet by mouth Every 12 (Twelve) Hours As Needed for Nausea or Vomiting.    tamsulosin (FLOMAX) 0.4 MG capsule 24 hr capsule 5/20/2024  No Yes    Take 1 capsule by mouth Daily for 360 days.    traZODone (DESYREL) 50 MG tablet 5/20/2024  No Yes    TAKE 1 TABLET EVERY NIGHT               ED Medications:    Medications   lactated ringers infusion 1,000 mL (1,000 mL Intravenous New Bag 5/21/24 1201)       Vital Signs:    Temp:  [97.4 °F (36.3 °C)] 97.4 °F (36.3 °C)  Heart Rate:  [73] 73  Resp:  [16] 16  BP: (125)/(92) 125/92        05/20/24  0936   Weight: 92.5 kg (204 lb)       Body mass index is 29.27 kg/m².    Physical Exam:      General Appearance:  Alert and cooperative, not in any acute distress.   Head:  Atraumatic and normocephalic, without obvious abnormality.   Eyes:           PERRLA, conjunctivae and sclerae normal, no Icterus. No pallor. Extra-occular movements are within normal limits.   Ears:  Ears appear intact with no abnormalities noted.   Respiratory/Lungs:   Breath sounds heard bilaterally equally.  No crackles or wheezing. No Pleural rub or bronchial breathing. Normal respiratory effort.    Cardiovascular/Heart:  Normal S1 and S2,    GI/Abdomen:   No masses, no hepatosplenomegaly. Soft, non-tender, non-distended, no hernia                 Musculoskeletal/ Extremities:   Moves all extremities well   Skin: No bleeding, bruising or rash, no induration   Psychiatric : Alert and oriented ×3.  No depression or anxiety    Neurologic: Cranial nerves 2 - 12 grossly intact, sensation intact, Motor power is normal and equal bilaterally.       EKG:          Labs:    Lab Results (last 24 hours)       Procedure Component Value Units Date/Time    POC Glucose Once [[id number]]  (Abnormal) Collected: 05/21/24 1141    Specimen: Blood Updated: 05/21/24 1145     Glucose 166 mg/dL      Comment: Serial Number: [id number]:  [id number]               Radiology:    Imaging Results (Last 72 Hours)       ** No results found for the last 72 hours. **            Assessment:    Screening colonoscopy     Plan:     I recommend a screening colonoscopy to the patient.  The patient understands the procedure and the reason for the procedure.  The patient also understands the risks which include but are not limited to bleeding and perforation and they understand the ramifications of these potential complications including operative intervention and wish to proceed.    Robby Menendez MD  05/21/24  12:30 EDT

## 2024-05-21 NOTE — ANESTHESIA POSTPROCEDURE EVALUATION
Patient: Yehuda SUBRAMANIAN Timi    Procedure Summary       Date: 05/21/24 Room / Location: [facility]COPY 3 / [facility]    Anesthesia Start: 1230 Anesthesia Stop: 1305    Procedure: COLONOSCOPY WITH POLYPECTOMY (Anus) Diagnosis:       Screening for colon cancer      (Screening for colon cancer [Z12.11])    Surgeons: Robby Menendez MD Provider: Roderick Sanchez CRNA    Anesthesia Type: MAC ASA Status: 3            Anesthesia Type: MAC    Vitals  No vitals data found for the desired time range.          Post Anesthesia Care and Evaluation    Patient location during evaluation: bedside  Patient participation: complete - patient participated  Level of consciousness: awake  Pain score: 0  Pain management: adequate    Airway patency: patent  Anesthetic complications: No anesthetic complications  PONV Status: controlled  Cardiovascular status: acceptable and stable  Respiratory status: acceptable and room air  Hydration status: acceptable    Comments: Vital signs as noted in nursing documentation as per protocol

## 2024-05-21 NOTE — ANESTHESIA PREPROCEDURE EVALUATION
Anesthesia Evaluation     Patient summary reviewed and Nursing notes reviewed   NPO Solid Status: > 8 hours  NPO Liquid Status: > 8 hours           Airway   Mallampati: II  TM distance: >3 FB  Neck ROM: full  Possible difficult intubation  Dental - normal exam   (+) edentulous    Pulmonary - normal exam   (+) a smoker Former, Abstained day of surgery,  Cardiovascular - normal exam    ECG reviewed  PT is on anticoagulation therapy  Patient on routine beta blocker    (+) hypertension well controlled 2 medications or greater, dysrhythmias Atrial Fib, hyperlipidemia      Neuro/Psych  GI/Hepatic/Renal/Endo    (+) obesity, diabetes mellitus type 2 well controlled, thyroid problem thyroid nodules    Musculoskeletal     Abdominal  - normal exam   Substance History      OB/GYN          Other   arthritis,     ROS/Med Hx Other: 1.  Normal left ventricular size and systolic function, LVEF 55-60%.  2.  Mild concentric LVH.  3.  Grade 1 diastolic dysfunction.  4.  Severely increased left atrial volume index.  5.  Normal right ventricular size and systolic function.  6.  Mild calcification aortic valve without significant stenosis.  7.  Trace AI.  8.  Mild MR.                Anesthesia Plan    ASA 3     MAC     (  All questions answered and informed consent obtained.  )  intravenous induction     Anesthetic plan, risks, benefits, and alternatives have been provided, discussed and informed consent has been obtained with: patient.  Pre-procedure education provided  Plan discussed with CRNA.    CODE STATUS:

## 2024-05-22 DIAGNOSIS — I10 ESSENTIAL HYPERTENSION: ICD-10-CM

## 2024-05-22 DIAGNOSIS — I48.20 ATRIAL FIBRILLATION, CHRONIC: ICD-10-CM

## 2024-05-22 RX ORDER — METOPROLOL TARTRATE 50 MG/1
TABLET, FILM COATED ORAL
Qty: 180 TABLET | Refills: 3 | Status: SHIPPED | OUTPATIENT
Start: 2024-05-22

## 2024-05-22 NOTE — TELEPHONE ENCOUNTER
Rx Refill Note  Requested Prescriptions     Pending Prescriptions Disp Refills    metoprolol tartrate (LOPRESSOR) 50 MG tablet [Pharmacy Med Name: METOPROLOL TARTRATE 50MG TABS] 180 tablet 1     Sig: TAKE 1 TABLET TWICE A DAY      Last office visit with prescribing clinician: 4/10/2024   Next office visit with prescribing clinician: 9/11/2024       Quinton Field MA  05/22/24, 07:48 EDT

## 2024-05-23 LAB — REF LAB TEST METHOD: NORMAL

## 2024-05-28 DIAGNOSIS — I25.10 CVD (CARDIOVASCULAR DISEASE): Primary | ICD-10-CM

## 2024-05-28 DIAGNOSIS — E11.65 TYPE 2 DIABETES MELLITUS WITH HYPERGLYCEMIA, WITHOUT LONG-TERM CURRENT USE OF INSULIN: ICD-10-CM

## 2024-05-28 RX ORDER — ATORVASTATIN CALCIUM 20 MG/1
20 TABLET, FILM COATED ORAL NIGHTLY
Qty: 90 TABLET | Refills: 3 | Status: SHIPPED | OUTPATIENT
Start: 2024-05-28

## 2024-05-29 ENCOUNTER — TELEPHONE (OUTPATIENT)
Dept: INTERNAL MEDICINE | Facility: CLINIC | Age: 69
End: 2024-05-29
Payer: MEDICARE

## 2024-05-29 DIAGNOSIS — I25.10 CVD (CARDIOVASCULAR DISEASE): ICD-10-CM

## 2024-05-29 DIAGNOSIS — E11.65 TYPE 2 DIABETES MELLITUS WITH HYPERGLYCEMIA, WITHOUT LONG-TERM CURRENT USE OF INSULIN: ICD-10-CM

## 2024-05-29 NOTE — TELEPHONE ENCOUNTER
Caller: Sydnee Capellan    Relationship: Emergency Contact    Best call back number: [phone]      What was the call regarding: PATIENT'S WIFE CALLED ASKING FOR THE PRESCRIPTION FOR JARDIANCE TO GO TO [facility] [address] [place] [address], [address] [phone] [facility] [phone] FX

## 2024-06-17 ENCOUNTER — OFFICE VISIT (OUTPATIENT)
Dept: CARDIOLOGY | Facility: CLINIC | Age: 69
End: 2024-06-17
Payer: MEDICARE

## 2024-06-17 VITALS
HEIGHT: 70 IN | HEART RATE: 71 BPM | WEIGHT: 209.8 LBS | OXYGEN SATURATION: 97 % | SYSTOLIC BLOOD PRESSURE: 120 MMHG | DIASTOLIC BLOOD PRESSURE: 82 MMHG | RESPIRATION RATE: 19 BRPM | BODY MASS INDEX: 30.03 KG/M2

## 2024-06-17 DIAGNOSIS — I10 ESSENTIAL HYPERTENSION: ICD-10-CM

## 2024-06-17 DIAGNOSIS — E11.65 TYPE 2 DIABETES MELLITUS WITH HYPERGLYCEMIA, WITHOUT LONG-TERM CURRENT USE OF INSULIN: ICD-10-CM

## 2024-06-17 DIAGNOSIS — I48.20 ATRIAL FIBRILLATION, CHRONIC: Primary | ICD-10-CM

## 2024-06-17 DIAGNOSIS — E78.5 DYSLIPIDEMIA: ICD-10-CM

## 2024-06-17 PROCEDURE — 3074F SYST BP LT 130 MM HG: CPT | Performed by: INTERNAL MEDICINE

## 2024-06-17 PROCEDURE — 99213 OFFICE O/P EST LOW 20 MIN: CPT | Performed by: INTERNAL MEDICINE

## 2024-06-17 PROCEDURE — 3079F DIAST BP 80-89 MM HG: CPT | Performed by: INTERNAL MEDICINE

## 2024-06-17 NOTE — PROGRESS NOTES
[facility] Cardiology Office Follow Up Note    Yehuda Capellan  [id number]  2024    Primary Care Provider: Cindy Zhang MD    Chief Complaint: Routine follow-up    History of Present Illness:   Mr. Yehuda Capellan is a 69y.o. male who presents to the Cardiology Clinic for routine follow-up.  The patient has a past medical history significant for type 2 diabetes mellitus, hypertension, and hyperlipidemia.  He has a past cardiac history significant for permanent atrial fibrillation.  He is chronically anticoagulated with Eliquis for CVA prophylaxis.  Today, the patient reports he is doing well from a cardiac standpoint.  He continues to deny any episodes of palpitations.  His atrial fibrillation remains asymptomatic.  He continues to tolerate Eliquis without significant bleeding or bruising.  No chest pain or exertional chest discomfort.  No specific complaints today.     Past Cardiac Testin. Last Coronary Angio: None  2. Prior Stress Testin, no evidence of inducible ischemia  3. Last Echo:   1.  Normal left ventricular size and systolic function, LVEF 55-60%.  2.  Mild concentric LVH.  3.  Grade 1 diastolic dysfunction.  4.  Severely increased left atrial volume index.  5.  Normal right ventricular size and systolic function.  6.  Mild calcification aortic valve without significant stenosis.  7.  Trace AI.  8.  Mild MR.  4. Prior Holter Monitor: None    Review of Systems:   Review of Systems   Constitutional:  Negative for activity change, appetite change, chills, diaphoresis, fatigue, fever, unexpected weight gain and unexpected weight loss.   Eyes:  Negative for blurred vision and double vision.   Respiratory:  Negative for cough, chest tightness, shortness of breath and wheezing.    Cardiovascular:  Negative for chest pain, palpitations and leg swelling.   Gastrointestinal:  Negative for abdominal pain, anal bleeding, blood in stool and GERD.   Endocrine:  Negative for cold intolerance and heat intolerance.   Genitourinary:  Negative for hematuria.   Neurological:  Negative for dizziness, syncope, weakness and light-headedness.   Hematological:  Does not bruise/bleed easily.   Psychiatric/Behavioral:  Negative for depressed mood and stress. The patient is not nervous/anxious.        I have reviewed and/or updated the patient's past medical, past surgical, family, social history, problem list and allergies as appropriate.     Medications:     Current Outpatient Medications:     atorvastatin (LIPITOR) 20 MG tablet, Take 1 tablet by mouth Every Night. To lower cholesterol and risk of stroke., Disp: 90 tablet, Rfl: 3    Blood Glucose Calibration (Accu-Chek Darcy) solution, Use as directed. E11.9, Disp: 1 each, Rfl: 3    cetirizine (zyrTEC) 10 MG tablet, Take 1 tablet by mouth Daily., Disp: , Rfl:     Cyanocobalamin (Vitamin B-12) 1000 MCG sublingual tablet, Place 1 tablet under the tongue Daily., Disp: , Rfl:     Eliquis 5 MG tablet tablet, TAKE 1 TABLET EVERY 12     HOURS FOR ATRIAL           FIBRILLATION, Disp: 180 tablet, Rfl: 0    empagliflozin (Jardiance) 25 MG tablet tablet, Take 1 tablet by mouth Daily. For diabetes mellitus., Disp: 90 tablet, Rfl: 3    gabapentin (NEURONTIN) 300 MG capsule, Take 1 capsule by mouth 4 (Four) Times a Day As Needed (nerve pain). (Patient taking differently: Take 1 capsule by mouth 4 (Four) Times a Day As Needed (nerve pain- pt states usually takes once nightly).), Disp: 360 capsule, Rfl: 1    glimepiride (AMARYL) 2 MG tablet, TAKE 1 TABLET EVERY MORNINGBEFORE BREAKFAST, Disp: 90 tablet, Rfl: 3    glucose blood (Accu-Chek Darcy Plus) test strip, CHECK GLUCOSE TWO TIMES A  DAY FOR DIABETES MELLITUS, Disp: 200 each, Rfl: 2    glucose monitor monitoring kit, ACCU-CHECK DARCY PLUS METER as requested., Disp: 1 each, Rfl: 1    Lancets Misc. misc, TEST TWICE A DAY FOR DM. E11.9. patient requests acc-check softclick, Disp: 200 each, Rfl: 3     "losartan-hydrochlorothiazide (HYZAAR) 100-12.5 MG per tablet, Take 1 tablet by mouth Daily. Indications: High Blood Pressure Disorder (Patient taking differently: Take 1 tablet by mouth Daily.), Disp: 90 tablet, Rfl: 3    metoprolol tartrate (LOPRESSOR) 50 MG tablet, TAKE 1 TABLET TWICE A DAY, Disp: 180 tablet, Rfl: 3    multivitamin with minerals tablet tablet, Take 1 tablet by mouth Daily., Disp: , Rfl:     tamsulosin (FLOMAX) 0.4 MG capsule 24 hr capsule, Take 1 capsule by mouth Daily for 360 days., Disp: 90 capsule, Rfl: 3    traZODone (DESYREL) 50 MG tablet, TAKE 1 TABLET EVERY NIGHT, Disp: 90 tablet, Rfl: 3    Physical Exam:  Vital Signs:   Vitals:    06/17/24 0905   BP: 120/82   BP Location: Right arm   Patient Position: Sitting   Cuff Size: Adult   Pulse: 71   Resp: 19   SpO2: 97%   Weight: 95.2 kg (209 lb 12.8 oz)   Height: 177.8 cm (70\")       Physical Exam  Constitutional:       General: He is not in acute distress.     Appearance: Normal appearance. He is not diaphoretic.   HENT:      Head: Normocephalic and atraumatic.   Cardiovascular:      Rate and Rhythm: Normal rate and regular rhythm.      Heart sounds: No murmur heard.  Pulmonary:      Effort: Pulmonary effort is normal. No respiratory distress.      Breath sounds: Normal breath sounds. No stridor. No wheezing, rhonchi or rales.   Abdominal:      General: Bowel sounds are normal. There is no distension.      Palpations: Abdomen is soft.      Tenderness: There is no abdominal tenderness. There is no guarding or rebound.   Musculoskeletal:         General: No swelling. Normal range of motion.      Cervical back: Neck supple. No tenderness.   Skin:     General: Skin is warm and dry.   Neurological:      General: No focal deficit present.      Mental Status: He is alert and oriented to person, place, and time.   Psychiatric:         Mood and Affect: Mood normal.         Behavior: Behavior normal.         Results Review:   I reviewed the patient's new " clinical results.        Assessment / Plan:     1. Atrial fibrillation, permanent   -- Remains rate controlled and asymptomatic  --Will continue metoprolol for ventricular rate control strategy  --Continue Eliquis for CVA prophylaxis  --Follow-up in 1 year, sooner if required     2.  Moderate mitral regurgitation  -- Last echocardiogram showed mitral regurgitation was mild  --Routine surveillance echocardiogram in 2-3 years     3. Primary hypertension  -- BP adequately controlled today     4. Pure hypercholesterolemia  -- Lipid profile in 5/24 showed LDL 55, HDL 40, triglycerides 83  --Continue high intensity statin     5. Type 2 diabetes mellitus   -- Last hemoglobin A1c in 7.7% in 5/24  -- Management per PCP    Follow Up:   Return in about 1 year (around 6/17/2025).      Thank you for allowing me to participate in the care of your patient. Please to not hesitate to contact me with additional questions or concerns.     CAROLE Grajeda MD  Interventional Cardiology   06/17/2024  09:05 EDT

## 2024-07-07 DIAGNOSIS — I48.20 ATRIAL FIBRILLATION, CHRONIC: ICD-10-CM

## 2024-07-08 RX ORDER — APIXABAN 5 MG/1
TABLET, FILM COATED ORAL
Qty: 180 TABLET | Refills: 0 | Status: SHIPPED | OUTPATIENT
Start: 2024-07-08

## 2024-07-23 ENCOUNTER — TELEPHONE (OUTPATIENT)
Dept: CARDIOLOGY | Facility: CLINIC | Age: 69
End: 2024-07-23

## 2024-07-23 NOTE — TELEPHONE ENCOUNTER
This was regarding patient assistance and needing patient to provide the 3% out of pocket cost. Advised patient to call pharmacy and provide us with form so we can process his patient assistance.

## 2024-07-23 NOTE — TELEPHONE ENCOUNTER
"  Caller: Yehuda Capellan \"Ray\"    Relationship: Self    Best call back number: [phone]    Which medication are you concerned about: ELIQUIS 5 MG    Who prescribed you this medication: JOANIE POSADAS MD    What are your concerns: PATIENT CALLED STATING HIS PHARMACY CAN'T SEND A FAX TO THE OFFICE ABOUT LOWERING THE PRICE FOR HIS ELIQUIS 5 MG SO THEY'RE SENDING IT TO HIM AND HE'S GOING TO BRING IT TO THE OFFICE. PLEASE CALL HIM BACK WHEN POSSIBLE WITH ANY QUESTIONS. THANK YOU      "

## 2024-07-26 ENCOUNTER — TELEPHONE (OUTPATIENT)
Dept: ORTHOPEDIC SURGERY | Facility: CLINIC | Age: 69
End: 2024-07-26

## 2024-07-26 DIAGNOSIS — M16.12 PRIMARY OSTEOARTHRITIS OF LEFT HIP: Primary | ICD-10-CM

## 2024-07-26 NOTE — TELEPHONE ENCOUNTER
Returned patients call, he was not on the schedule so I put In an order and scheduled for 1:20pm 8/8/24. Patient was advised to arrive at the hospital 15 minutes early, he verbalized understanding.

## 2024-07-26 NOTE — TELEPHONE ENCOUNTER
I forwarded the patient's messages to Radha to schedule on our next available injection day. Thank you.

## 2024-07-26 NOTE — TELEPHONE ENCOUNTER
Tried calling . Left detailed message that Maninder Dia would be doing Fl guided hip injections 8/8/24.

## 2024-07-26 NOTE — TELEPHONE ENCOUNTER
HUB AGENT ATTEMPTED CLINICAL WARM TRANSFER - NO ANSWER     PATIENT CALLING BACK x 3 re VMAIL LEFT THIS MORNING FRI 7/26 @ 1147 BY JUAN alas LEFT HIP FLURO GUIDED INJECTION     PATIENT STATED HE WAS TOLD 08-08-24 DATE BUT UNSURE OF TIME?     PLEASE CALL / LEAVE VMAIL PATIENT CELL [phone] TO DISCUSS     PATIENT INFORMED TO ALLOW 24-48 (BUSINESS) HOURS aka 1-2 BUSINESS DAYS FOR STAFF TO ADDRESS NON-URGENT MESSAGES     THANKS

## 2024-07-26 NOTE — TELEPHONE ENCOUNTER
"Caller: Yehuda Capellan \"Ray\"    Relationship to patient: Self    Best call back number: [phone] (home)       Chief complaint: LEFT HIP     Type of visit: INJECTION THAT IS DONE AT HOSPITAL       "

## 2024-08-08 ENCOUNTER — HOSPITAL ENCOUNTER (OUTPATIENT)
Dept: GENERAL RADIOLOGY | Facility: HOSPITAL | Age: 69
Discharge: HOME | End: 2024-08-08
Payer: MEDICARE

## 2024-08-08 ENCOUNTER — TELEPHONE (OUTPATIENT)
Dept: CARDIOLOGY | Facility: CLINIC | Age: 69
End: 2024-08-08
Payer: MEDICARE

## 2024-08-08 DIAGNOSIS — M16.12 PRIMARY OSTEOARTHRITIS OF LEFT HIP: ICD-10-CM

## 2024-08-08 DIAGNOSIS — I48.20 ATRIAL FIBRILLATION, CHRONIC: ICD-10-CM

## 2024-08-08 PROCEDURE — 25010000002 METHYLPREDNISOLONE PER 80 MG: Performed by: PHYSICIAN ASSISTANT

## 2024-08-08 PROCEDURE — 77002 NEEDLE LOCALIZATION BY XRAY: CPT

## 2024-08-08 PROCEDURE — 25010000002 LIDOCAINE 1 % SOLUTION: Performed by: PHYSICIAN ASSISTANT

## 2024-08-08 RX ORDER — METHYLPREDNISOLONE ACETATE 80 MG/ML
80 INJECTION, SUSPENSION INTRA-ARTICULAR; INTRALESIONAL; INTRAMUSCULAR; SOFT TISSUE ONCE
Status: COMPLETED | OUTPATIENT
Start: 2024-08-08 | End: 2024-08-08

## 2024-08-08 RX ORDER — LIDOCAINE HYDROCHLORIDE 10 MG/ML
5 INJECTION, SOLUTION INFILTRATION; PERINEURAL ONCE
Status: COMPLETED | OUTPATIENT
Start: 2024-08-08 | End: 2024-08-08

## 2024-08-08 RX ADMIN — LIDOCAINE HYDROCHLORIDE 5 ML: 10 INJECTION, SOLUTION INFILTRATION; PERINEURAL at 14:46

## 2024-08-08 RX ADMIN — METHYLPREDNISOLONE ACETATE 80 MG: 80 INJECTION, SUSPENSION INTRA-ARTICULAR; INTRALESIONAL; INTRAMUSCULAR; SOFT TISSUE at 14:46

## 2024-08-10 DIAGNOSIS — G47.9 TROUBLE IN SLEEPING: ICD-10-CM

## 2024-08-10 RX ORDER — TRAZODONE HYDROCHLORIDE 50 MG/1
TABLET ORAL
Qty: 90 TABLET | Refills: 2 | Status: SHIPPED | OUTPATIENT
Start: 2024-08-10

## 2024-08-12 NOTE — POST-PROCEDURE NOTE
[facility]  [address], [address]  [place] [address]  [phone]        PROCEDURE REPORT        DIAGNOSIS:  Left hip osteoarthritis, symptomatic    PROCEDURE: Left hip injection under flouroscopy      Yehuda Capellan with date of birth 1955  presents to [facility] Radiology Department today for injection therapy.        Patient presents to [facility] Radiology Department Flouroscopy Suite on 8/12/2024 for planned elective left hip injection under flouroscopy for symptomatic osteoarthritis.    Procedure:     After consent was obtained, and using ethyl chloride topical local anesthetic, the left hip was then prepped and draped with sterile technique. With an anterior hip approach, flouroscopy guidance, and care to stay lateral of the femoral artery, the hip joint was entered via a 20 gauge spinal needle.  An image was saved of the needle within the hip joint space.  A mixture of 80 mg methylprednisolone in one ml plus 5 ml of 1% plain Lidocaine was injected and the needle withdrawn and a band aid applied. The procedure was well tolerated and without complication.  The patient did remain stable and with baseline ambulation. The patient is asked to avoid stressful physical activity for a day or two before resuming full regular activities.  There might be some soreness initially and patient to call for any adverse effect of the injection treatment.  Call or return to clinic if any fever, swelling, persistent pain, lack of anticipated improvement or other symptoms or concerns.    Impression: Symptomatic left hip osteoarthritis.      Recommendations/Plan:      Treatment and patient advice as noted here and in office visit report.  Orthopedic activities and goals reviewed and patient expressed appreciation.  Call or notify for any adverse effect from injection therapy.    Exercise: As tolerated.  No strenuous activity for a few days as appropriate.  Activity:  May perform usual  activities as tolerated.      Patient will return to our clinic at scheduled appointment.  Patient agreeable to call or return sooner for any concerns.

## 2024-08-26 ENCOUNTER — TELEPHONE (OUTPATIENT)
Dept: CARDIOLOGY | Facility: CLINIC | Age: 69
End: 2024-08-26
Payer: MEDICARE

## 2024-08-26 NOTE — TELEPHONE ENCOUNTER
"Name: Yehuda Capellan \"Ray\"      Relationship: Self      Best Callback Number: [phone]      HUB PROVIDED THE RELAY MESSAGE FROM THE OFFICE      PATIENT: HAS FURTHER QUESTIONS AND WOULD LIKE A CALL BACK AT THE FOLLOWING PHONE [phone]    ADDITIONAL INFORMATION:   "

## 2024-08-26 NOTE — TELEPHONE ENCOUNTER
Caller: Sydnee Capellan    Relationship: Emergency Contact    Best call back number: [phone]     What is the best time to reach you: ANYTIME     What was the call regarding: PT REPORTS THAT THEY WERE GIVEN A CALL BUT THERE IS NO DOCUMENTATION IN THE CHART ABOUT THIS. IF THIS WAS NOT A MISTAKE, PLEASE CALL PT BACK.     STATES THIS MAY BE ABOUT PAPERS PT IS DROPPING OFF, WONDERING IF [facility], PLEASE ADVISE.     Is it okay if the provider responds through MyChart: CALL

## 2024-09-11 ENCOUNTER — OFFICE VISIT (OUTPATIENT)
Dept: INTERNAL MEDICINE | Facility: CLINIC | Age: 69
End: 2024-09-11
Payer: MEDICARE

## 2024-09-11 VITALS
HEIGHT: 70 IN | BODY MASS INDEX: 29.46 KG/M2 | OXYGEN SATURATION: 95 % | TEMPERATURE: 97.1 F | HEART RATE: 72 BPM | WEIGHT: 205.8 LBS | DIASTOLIC BLOOD PRESSURE: 70 MMHG | SYSTOLIC BLOOD PRESSURE: 120 MMHG

## 2024-09-11 DIAGNOSIS — E11.65 TYPE 2 DIABETES MELLITUS WITH HYPERGLYCEMIA, WITHOUT LONG-TERM CURRENT USE OF INSULIN: ICD-10-CM

## 2024-09-11 DIAGNOSIS — Z91.81 AT MODERATE RISK FOR FALL: ICD-10-CM

## 2024-09-11 DIAGNOSIS — G63 NEUROPATHY DUE TO MEDICAL CONDITION: ICD-10-CM

## 2024-09-11 DIAGNOSIS — I10 ESSENTIAL HYPERTENSION: ICD-10-CM

## 2024-09-11 DIAGNOSIS — Z23 NEED FOR INFLUENZA VACCINATION: ICD-10-CM

## 2024-09-11 DIAGNOSIS — M62.838 MUSCLE SPASM: ICD-10-CM

## 2024-09-11 DIAGNOSIS — I48.20 ATRIAL FIBRILLATION, CHRONIC: ICD-10-CM

## 2024-09-11 DIAGNOSIS — Z00.00 MEDICARE ANNUAL WELLNESS VISIT, SUBSEQUENT: Primary | ICD-10-CM

## 2024-09-11 PROBLEM — Z86.0100 HISTORY OF COLON POLYPS: Status: ACTIVE | Noted: 2024-09-11

## 2024-09-11 PROBLEM — K57.90 DIVERTICULOSIS: Chronic | Status: ACTIVE | Noted: 2024-09-11

## 2024-09-11 PROBLEM — K57.90 DIVERTICULOSIS: Status: ACTIVE | Noted: 2024-09-11

## 2024-09-11 PROBLEM — Z12.11 SCREENING FOR COLON CANCER: Status: RESOLVED | Noted: 2024-01-04 | Resolved: 2024-09-11

## 2024-09-11 PROBLEM — Z86.010 HISTORY OF COLON POLYPS: Status: ACTIVE | Noted: 2024-09-11

## 2024-09-11 LAB
EXPIRATION DATE: ABNORMAL
HBA1C MFR BLD: 7.8 % (ref 4.5–5.7)
Lab: ABNORMAL

## 2024-09-11 PROCEDURE — G0439 PPPS, SUBSEQ VISIT: HCPCS | Performed by: FAMILY MEDICINE

## 2024-09-11 PROCEDURE — 1159F MED LIST DOCD IN RCRD: CPT | Performed by: FAMILY MEDICINE

## 2024-09-11 PROCEDURE — 83036 HEMOGLOBIN GLYCOSYLATED A1C: CPT | Performed by: FAMILY MEDICINE

## 2024-09-11 PROCEDURE — 90662 IIV NO PRSV INCREASED AG IM: CPT | Performed by: FAMILY MEDICINE

## 2024-09-11 PROCEDURE — 3078F DIAST BP <80 MM HG: CPT | Performed by: FAMILY MEDICINE

## 2024-09-11 PROCEDURE — 1160F RVW MEDS BY RX/DR IN RCRD: CPT | Performed by: FAMILY MEDICINE

## 2024-09-11 PROCEDURE — 3051F HG A1C>EQUAL 7.0%<8.0%: CPT | Performed by: FAMILY MEDICINE

## 2024-09-11 PROCEDURE — G0008 ADMIN INFLUENZA VIRUS VAC: HCPCS | Performed by: FAMILY MEDICINE

## 2024-09-11 PROCEDURE — 99214 OFFICE O/P EST MOD 30 MIN: CPT | Performed by: FAMILY MEDICINE

## 2024-09-11 PROCEDURE — 3074F SYST BP LT 130 MM HG: CPT | Performed by: FAMILY MEDICINE

## 2024-09-11 PROCEDURE — 1126F AMNT PAIN NOTED NONE PRSNT: CPT | Performed by: FAMILY MEDICINE

## 2024-09-11 PROCEDURE — 1170F FXNL STATUS ASSESSED: CPT | Performed by: FAMILY MEDICINE

## 2024-09-11 PROCEDURE — 99397 PER PM REEVAL EST PAT 65+ YR: CPT | Performed by: FAMILY MEDICINE

## 2024-09-11 RX ORDER — LOSARTAN POTASSIUM AND HYDROCHLOROTHIAZIDE 12.5; 1 MG/1; MG/1
1 TABLET ORAL DAILY
Qty: 90 TABLET | Refills: 3 | Status: SHIPPED | OUTPATIENT
Start: 2024-09-11

## 2024-09-11 RX ORDER — GLIMEPIRIDE 1 MG/1
3 TABLET ORAL
Qty: 270 TABLET | Refills: 3 | Status: SHIPPED | OUTPATIENT
Start: 2024-09-11

## 2024-09-11 RX ORDER — DAPAGLIFLOZIN 10 MG/1
10 TABLET, FILM COATED ORAL DAILY
Qty: 90 TABLET | Refills: 3 | Status: SHIPPED | OUTPATIENT
Start: 2024-09-11

## 2024-09-11 RX ORDER — GABAPENTIN 300 MG/1
300 CAPSULE ORAL 4 TIMES DAILY PRN
Qty: 360 CAPSULE | Refills: 1 | Status: SHIPPED | OUTPATIENT
Start: 2024-09-11

## 2024-09-11 NOTE — ASSESSMENT & PLAN NOTE
Diabetes is  stable but not controlled, goal A1C 7.5 or uder as age >64 yo .   Discussed diet changes he could implement. Continue Jardiance 25 mg or start Farxiga 10 mg (Not to use both, providing rx for both and signing forms for patient assistance for both). No change glimepiride, take with food. Appreciate patient providing copy of eye exam from July 2024.  Diabetes will be reassessed in 3 months

## 2024-09-11 NOTE — PROGRESS NOTES
Subjective   The ABCs of the Annual Wellness Visit  Medicare Wellness Visit      Yehuda Capellan is a 69 y.o. patient who presents for a Medicare Wellness Visit.    The following portions of the patient's history were reviewed and   updated as appropriate: allergies, current medications, past family history, past medical history, past social history, past surgical history, and problem list.    Compared to one year ago, the patient's physical   health is better.  Compared to one year ago, the patient's mental   health is the same.    Recent Hospitalizations:  He was admitted within the past 365 days at [facility].     Current Medical Providers:  Patient Care Team:  Cindy Zhang MD as PCP - General (Family Medicine)  Jennifer Lawler MD as Consulting Physician (Endocrinology)  Cruzito Grajeda MD as Consulting Physician (Cardiology)  Margarita Molina MD as Consulting Physician (Urology)  Modesto Dickens MD as Surgeon (Orthopedic Surgery)  Eye [facility] (Optometry)    Outpatient Medications Prior to Visit   Medication Sig Dispense Refill    apixaban (Eliquis) 5 MG tablet tablet Take 1 tablet by mouth Every 12 (Twelve) Hours. 28 tablet 0    atorvastatin (LIPITOR) 20 MG tablet Take 1 tablet by mouth Every Night. To lower cholesterol and risk of stroke. 90 tablet 3    Blood Glucose Calibration (Accu-Chek Darcy) solution Use as directed. E11.9 1 each 3    cetirizine (zyrTEC) 10 MG tablet Take 1 tablet by mouth Daily.      Cyanocobalamin (Vitamin B-12) 1000 MCG sublingual tablet Place 1 tablet under the tongue Daily.      empagliflozin (Jardiance) 25 MG tablet tablet Take 1 tablet by mouth Daily. For diabetes mellitus. 90 tablet 3    glucose blood (Accu-Chek Darcy Plus) test strip CHECK GLUCOSE TWO TIMES A  DAY FOR DIABETES MELLITUS 200 each 2    glucose monitor monitoring kit ACCU-CHECK DARCY PLUS METER as requested. 1 each 1    Lancets Misc. misc TEST TWICE A DAY FOR DM.  E11.9. patient requests acc-check softclick 200 each 3    metoprolol tartrate (LOPRESSOR) 50 MG tablet TAKE 1 TABLET TWICE A  tablet 3    multivitamin with minerals tablet tablet Take 1 tablet by mouth Daily.      tamsulosin (FLOMAX) 0.4 MG capsule 24 hr capsule Take 1 capsule by mouth Daily for 360 days. 90 capsule 3    traZODone (DESYREL) 50 MG tablet TAKE 1 TABLET EVERY NIGHT 90 tablet 2    gabapentin (NEURONTIN) 300 MG capsule Take 1 capsule by mouth 4 (Four) Times a Day As Needed (nerve pain). (Patient taking differently: Take 1 capsule by mouth 4 (Four) Times a Day As Needed (nerve pain- pt states usually takes once nightly).) 360 capsule 1    glimepiride (AMARYL) 2 MG tablet TAKE 1 TABLET EVERY MORNINGBEFORE BREAKFAST (Patient taking differently: Take 1 tablet by mouth Every Morning Before Breakfast. 3 mg qd) 90 tablet 3    losartan-hydrochlorothiazide (HYZAAR) 100-12.5 MG per tablet Take 1 tablet by mouth Daily. Indications: High Blood Pressure Disorder (Patient taking differently: Take 1 tablet by mouth Daily.) 90 tablet 3     No facility-administered medications prior to visit.     No opioid medication identified on active medication list. I have reviewed chart for other potential  high risk medication/s and harmful drug interactions in the elderly.      Aspirin is not on active medication list.  Aspirin use is contraindicated for this patient due to: current use of Eliquis.  .    Patient Active Problem List   Diagnosis    Atrial fibrillation, chronic    Dyslipidemia    Essential hypertension    Type 2 diabetes mellitus with hyperglycemia, without long-term current use of insulin    Multinodular goiter (nontoxic)    Arthralgia of right hip    Primary osteoarthritis of right hip    Microalbuminuria due to type 2 diabetes mellitus    OA (osteoarthritis) of hip    Status post total hip replacement, right    S/P total right hip arthroplasty    Diverticulosis    History of colon polyps     Advance Care  "Planning Advance Directive is on file.  ACP discussion was declined by the patient. Patient has an advance directive in EMR which is still valid.             Objective   Vitals:    24 1514   BP: 120/70   Pulse: 72   Temp: 97.1 °F (36.2 °C)   TempSrc: Temporal   SpO2: 95%   Weight: 93.4 kg (205 lb 12.8 oz)   Height: 177.8 cm (70\")   PainSc: 0-No pain       Estimated body mass index is 29.53 kg/m² as calculated from the following:    Height as of this encounter: 177.8 cm (70\").    Weight as of this encounter: 93.4 kg (205 lb 12.8 oz).            Does the patient have evidence of cognitive impairment? No  Lab Results   Component Value Date    HGBA1C 7.8 (A) 2024                                                                                                Health  Risk Assessment    Smoking Status:  Social History     Tobacco Use   Smoking Status Former    Current packs/day: 0.00    Average packs/day: 2.0 packs/day for 15.0 years (30.0 ttl pk-yrs)    Types: Cigarettes    Start date: 1976    Quit date: 1991    Years since quittin.7    Passive exposure: Past   Smokeless Tobacco Never     Alcohol Consumption:  Social History     Substance and Sexual Activity   Alcohol Use Never       Fall Risk Screen  STEADI Fall Risk Assessment was completed, and patient is at MODERATE risk for falls. Assessment completed on:2024    Depression Screenin/11/2024     3:10 PM   PHQ-2/PHQ-9 Depression Screening   Little Interest or Pleasure in Doing Things 0-->not at all   Feeling Down, Depressed or Hopeless 0-->not at all   PHQ-9: Brief Depression Severity Measure Score 0     Health Habits and Functional and Cognitive Screenin/11/2024     3:04 PM   Functional & Cognitive Status   Do you have difficulty preparing food and eating? No   Do you have difficulty bathing yourself, getting dressed or grooming yourself? No   Do you have difficulty using the toilet? No   Do you have difficulty moving " around from place to place? No   Do you have trouble with steps or getting out of a bed or a chair? No   Current Diet Diabetic Diet   Dental Exam Other   Eye Exam Up to date   Exercise (times per week) 7 times per week   Current Exercises Include Walking;Other   Do you need help using the phone?  No   Are you deaf or do you have serious difficulty hearing?  Yes   Do you need help to go to places out of walking distance? No   Do you need help shopping? No   Do you need help preparing meals?  No   Do you need help with housework?  No   Do you need help with laundry? No   Do you need help taking your medications? No   Do you need help managing money? No   Do you ever drive or ride in a car without wearing a seat belt? No   Have you felt unusual stress, anger or loneliness in the last month? No   Who do you live with? Spouse   If you need help, do you have trouble finding someone available to you? No   Have you been bothered in the last four weeks by sexual problems? No   Do you have difficulty concentrating, remembering or making decisions? Yes           Age-appropriate Screening Schedule:  Refer to the list below for future screening recommendations based on patient's age, sex and/or medical conditions. Orders for these recommended tests are listed in the plan section. The patient has been provided with a written plan.    Health Maintenance List  Health Maintenance   Topic Date Due    DIABETIC FOOT EXAM  02/26/2021    DIABETIC EYE EXAM  02/10/2022    TDAP/TD VACCINES (1 - Tdap) 09/11/2024 (Originally 8/17/2019)    URINE MICROALBUMIN  09/29/2024 (Originally 9/9/2024)    COVID-19 Vaccine (3 - 2023-24 season) 12/01/2024 (Originally 9/1/2024)    ZOSTER VACCINE (2 of 3) 08/16/2029 (Originally 10/24/2014)    HEMOGLOBIN A1C  03/11/2025    BMI FOLLOWUP  04/09/2025    LIPID PANEL  05/24/2025    ANNUAL WELLNESS VISIT  09/11/2025    COLORECTAL CANCER SCREENING  05/21/2027    HEPATITIS C SCREENING  Completed    INFLUENZA VACCINE   Completed    Pneumococcal Vaccine 65+  Completed    AAA SCREEN (ONE-TIME)  Completed                                                                                                                                                CMS Preventative Services Quick Reference  Risk Factors Identified During Encounter  Fall Risk-High or Moderate: Information on Fall Prevention Shared in After Visit Summary and Sit to Stand Exercise Information Shared in After Visit Summary  Immunizations Discussed/Encouraged: Influenza    The above risks/problems have been discussed with the patient.  Pertinent information has been shared with the patient in the After Visit Summary.  An After Visit Summary and PPPS were made available to the patient.    Follow Up:   Next Medicare Wellness visit to be scheduled in 1 year.         Additional E&M Note during same encounter follows:  Patient has additional, significant, and separately identifiable condition(s)/problem(s) that require work above and beyond the Medicare Wellness Visit     Chief Complaint  Medicare Wellness-subsequent, Annual Exam, and Diabetes    Subjective   Diabetes  He presents for his follow-up diabetic visit. He has type 2 diabetes mellitus. Current diabetic treatment includes oral agent (dual therapy). He is compliant with treatment all of the time. His weight is decreasing steadily. He is following a generally healthy diet. Meal planning includes avoidance of concentrated sweets. An ACE inhibitor/angiotensin II receptor blocker is being taken.  Eye exam is current (record provided by patient, 7/3/24).   Additional comments: Current therapy: Glimepiride 3 mg, Jardiance 25 mg.  Past use: metformin    May be able to get patient assistance on Jardiance, possibly Farxiga easier to get assistance. Has forms to sign.   Hypertension  This is a chronic problem. The current episode started more than 1 year ago. The problem is controlled. Current antihypertension treatment includes  "angiotensin blockers and diuretics.     Santy is also being seen today for an annual adult preventative physical exam.  and Santy is also being seen today for additional medical problem/s.                Objective   Vital Signs:  /70   Pulse 72   Temp 97.1 °F (36.2 °C) (Temporal)   Ht 177.8 cm (70\")   Wt 93.4 kg (205 lb 12.8 oz)   SpO2 95%   BMI 29.53 kg/m²   Physical Exam  Vitals and nursing note reviewed.   Constitutional:       General: He is not in acute distress.     Appearance: Normal appearance. He is well-developed, well-groomed and overweight. He is not ill-appearing, toxic-appearing or diaphoretic.   HENT:      Head: Normocephalic and atraumatic.      Right Ear: Hearing normal.      Left Ear: Hearing normal.   Eyes:      General: Lids are normal. No scleral icterus.        Right eye: No discharge.         Left eye: No discharge.      Extraocular Movements: Extraocular movements intact.   Cardiovascular:      Rate and Rhythm: Normal rate and regular rhythm.   Pulmonary:      Effort: Pulmonary effort is normal.      Breath sounds: Normal breath sounds.   Musculoskeletal:      Cervical back: Neck supple.   Skin:     Coloration: Skin is not jaundiced or pale.   Neurological:      General: No focal deficit present.      Mental Status: He is alert and oriented to person, place, and time.   Psychiatric:         Attention and Perception: Attention and perception normal.         Mood and Affect: Mood and affect normal.         Speech: Speech normal.         Behavior: Behavior normal. Behavior is cooperative.         Thought Content: Thought content normal.         Cognition and Memory: Cognition and memory normal.         Judgment: Judgment normal.         The following data was reviewed by: Cindy Zhang MD on 09/11/2024:  Lab Results   Component Value Date    HGBA1C 7.8 (A) 09/11/2024    HGBA1C 7.70 (H) 05/24/2024    HGBA1C 7.00 (H) 01/30/2024      Lab Results   Component Value Date    CHLPL 112 " 05/24/2024    TRIG 83 05/24/2024    HDL 40 05/24/2024    LDL 55 05/24/2024      Colonoscopy (05/21/2024 11:52) repeat 3 years. Polyps, diverticulosis.        Assessment and Plan          Diagnoses and all orders for this visit:    1. Medicare annual wellness visit, subsequent (Primary)    2. Type 2 diabetes mellitus with hyperglycemia, without long-term current use of insulin  Assessment & Plan:  Diabetes is  stable but not controlled, goal A1C 7.5 or uder as age >66 yo .   Discussed diet changes he could implement. Continue Jardiance 25 mg or start Farxiga 10 mg (Not to use both, providing rx for both and signing forms for patient assistance for both). No change glimepiride, take with food. Appreciate patient providing copy of eye exam from July 2024.  Diabetes will be reassessed in 3 months    Orders:  -     POC Glycosylated Hemoglobin (Hb A1C)  -     gabapentin (NEURONTIN) 300 MG capsule; Take 1 capsule by mouth 4 (Four) Times a Day As Needed (nerve pain).  Dispense: 360 capsule; Refill: 1  -     glimepiride (Amaryl) 1 MG tablet; Take 3 tablets by mouth Every Morning Before Breakfast.  Dispense: 270 tablet; Refill: 3  -     dapagliflozin Propanediol (Farxiga) 10 MG tablet; Take 10 mg by mouth Daily.  Dispense: 90 tablet; Refill: 3  -     empagliflozin (JARDIANCE) 25 MG tablet tablet; Take 1 tablet by mouth Daily.  Dispense: 90 tablet; Refill: 3    3. Essential hypertension  Assessment & Plan:  Hypertension is improving with treatment.  Continue current treatment regimen.  Blood pressure will be reassessed at the next regular appointment.    Orders:  -     losartan-hydrochlorothiazide (HYZAAR) 100-12.5 MG per tablet; Take 1 tablet by mouth Daily. Indications: High Blood Pressure Disorder  Dispense: 90 tablet; Refill: 3    4. Atrial fibrillation, chronic  Assessment & Plan:  Cardiology follow up Dr. Grajeda scheduled June 2025  Last note from Dr. Grajeda reviewed (6/17/24), all stable, follow up in a year.      5.  Neuropathy due to medical condition  Comments:  diabetes  Orders:  -     gabapentin (NEURONTIN) 300 MG capsule; Take 1 capsule by mouth 4 (Four) Times a Day As Needed (nerve pain).  Dispense: 360 capsule; Refill: 1    6. Muscle spasm  -     tiZANidine (ZANAFLEX) 4 MG tablet; Take 1 tablet by mouth At Night As Needed for Muscle Spasms.  Dispense: 90 tablet; Refill: 3    7. At moderate risk for fall  Comments:  information via avs    8. Need for influenza vaccination  -     Fluzone High-Dose 65+yrs ([id number])       Orders Placed This Encounter   Procedures    Fluzone High-Dose 65+yrs ([id number])    POC Glycosylated Hemoglobin (Hb A1C)     Order Specific Question:   Release to patient     Answer:   Routine Release [[id number]]     New Medications Ordered This Visit   Medications    gabapentin (NEURONTIN) 300 MG capsule     Sig: Take 1 capsule by mouth 4 (Four) Times a Day As Needed (nerve pain).     Dispense:  360 capsule     Refill:  1    tiZANidine (ZANAFLEX) 4 MG tablet     Sig: Take 1 tablet by mouth At Night As Needed for Muscle Spasms.     Dispense:  90 tablet     Refill:  3    glimepiride (Amaryl) 1 MG tablet     Sig: Take 3 tablets by mouth Every Morning Before Breakfast.     Dispense:  270 tablet     Refill:  3    dapagliflozin Propanediol (Farxiga) 10 MG tablet     Sig: Take 10 mg by mouth Daily.     Dispense:  90 tablet     Refill:  3    empagliflozin (JARDIANCE) 25 MG tablet tablet     Sig: Take 1 tablet by mouth Daily.     Dispense:  90 tablet     Refill:  3     Print for patient assistance forms    losartan-hydrochlorothiazide (HYZAAR) 100-12.5 MG per tablet     Sig: Take 1 tablet by mouth Daily. Indications: High Blood Pressure Disorder     Dispense:  90 tablet     Refill:  3        I spent 30 minutes caring for Senate on this date of service. This time includes time spent by me in the following activities:preparing for the visit, reviewing tests, obtaining and/or reviewing a separately obtained  history, performing a medically appropriate examination and/or evaluation , counseling and educating the patient/family/caregiver, ordering medications, tests, or procedures, and documenting information in the medical record    I spent 25 minutes on the separately reported service of [id number]. This time is not included in the time used to support the E/M service also reported today.     Follow Up   Return in about 3 months (around 12/15/2024) for Diabetes follow up, A1C in office.  Patient was given instructions and counseling regarding his condition or for health maintenance advice. Please see specific information pulled into the AVS if appropriate.

## 2024-09-11 NOTE — PATIENT INSTRUCTIONS
Medicare Wellness  Personal Prevention Plan of Service     Date of Office Visit:    Encounter Provider:  Cindy Zhang MD  Place of Service:  [facility]  Patient Name: Yehuda Capellan  :  1955    As part of the Medicare Wellness portion of your visit today, we are providing you with this personalized preventive plan of services (PPPS). This plan is based upon recommendations of the [place] Preventive Services Task Force (USPSTF) and the Advisory Committee on Immunization Practices (ACIP).    This lists the preventive care services that should be considered, and provides dates of when you are due. Items listed as completed are up-to-date and do not require any further intervention.    Health Maintenance   Topic Date Due    DIABETIC FOOT EXAM  2021    DIABETIC EYE EXAM  02/10/2022    ANNUAL WELLNESS VISIT  2024    INFLUENZA VACCINE  2024    HEMOGLOBIN A1C  2024    TDAP/TD VACCINES (1 - Tdap) 2024 (Originally 2019)    URINE MICROALBUMIN  2024 (Originally 2024)    COVID-19 Vaccine (3 - -24 season) 2024 (Originally 2024)    ZOSTER VACCINE (2 of 3) 2029 (Originally 10/24/2014)    BMI FOLLOWUP  2025    LIPID PANEL  2025    COLORECTAL CANCER SCREENING  2027    HEPATITIS C SCREENING  Completed    Pneumococcal Vaccine 65+  Completed    AAA SCREEN (ONE-TIME)  Completed       Orders Placed This Encounter   Procedures    POC Glycosylated Hemoglobin (Hb A1C)     Order Specific Question:   Release to patient     Answer:   Routine Release [[id number]]         Return in about 3 months (around 12/15/2024) for Diabetes follow up, A1C in office.        Health Maintenance After Age 65    After age 65, you are at a higher risk for certain long-term diseases and infections as well as injuries from falls. Falls are a major cause of broken bones and head injuries in people who are older than age 65.  Getting regular preventive care can help to keep you healthy and well. Preventive care includes getting regular testing and making lifestyle changes as recommended by your health care provider. Talk with your health care provider about:  Which screenings and tests you should have. A screening is a test that checks for a disease when you have no symptoms.  A diet and exercise plan that is right for you.    What should I know about screenings and tests to prevent falls?  Screening and testing are the best ways to find a health problem early. Early diagnosis and treatment give you the best chance of managing medical conditions that are common after age 65. Certain conditions and lifestyle choices may make you more likely to have a fall. Your health care provider may recommend:  Regular vision checks. Poor vision and conditions such as cataracts can make you more likely to have a fall. If you wear glasses, make sure to get your prescription updated if your vision changes.  Medicine review. Work with your health care provider to regularly review all of the medicines you are taking, including over-the-counter medicines. Ask your health care provider about any side effects that may make you more likely to have a fall. Tell your health care provider if any medicines that you take make you feel dizzy or sleepy.  Strength and balance checks. Your health care provider may recommend certain tests to check your strength and balance while standing, walking, or changing positions.  Foot health exam. Foot pain and numbness, as well as not wearing proper footwear, can make you more likely to have a fall.  Screenings, including:  Osteoporosis screening. Osteoporosis is a condition that causes the bones to get weaker and break more easily.  Blood pressure screening. Blood pressure changes and medicines to control blood pressure can make you feel dizzy.  Depression screening. You may be more likely to have a fall if you have a fear of  falling, feel depressed, or feel unable to do activities that you used to do.  Alcohol use screening. Using too much alcohol can affect your balance and may make you more likely to have a fall.    Follow these instructions at home:  Lifestyle  Do not drink alcohol if:  Your health care provider tells you not to drink.  If you drink alcohol:  Limit how much you have to:  0-1 drink a day for women.  0-2 drinks a day for men.  Know how much alcohol is in your drink. In the [place] one drink equals one 12 oz bottle of beer (355 mL), one 5 oz glass of wine (148 mL), or one 1½ oz glass of hard liquor (44 mL).  Do not use any products that contain nicotine or tobacco. These products include cigarettes, chewing tobacco, and vaping devices, such as e-cigarettes. If you need help quitting, ask your health care provider.    Activity    Follow a regular exercise program to stay fit. This will help you maintain your balance. Ask your health care provider what types of exercise are appropriate for you.  If you need a cane or walker, use it as recommended by your health care provider.  Wear supportive shoes that have nonskid soles.  Safety    Remove any tripping hazards, such as rugs, cords, and clutter.  Install safety equipment such as grab bars in bathrooms and safety rails on stairs.  Keep rooms and walkways well-lit.    General instructions  Talk with your health care provider about your risks for falling. Tell your health care provider if:  You fall. Be sure to tell your health care provider about all falls, even ones that seem minor.  You feel dizzy, tiredness (fatigue), or off-balance.  Take over-the-counter and prescription medicines only as told by your health care provider. These include supplements.  Eat a healthy diet and maintain a healthy weight. A healthy diet includes low-fat dairy products, low-fat (lean) meats, and fiber from whole grains, beans, and lots of fruits and vegetables.  Stay current with your  vaccines.  Schedule regular health, dental, and eye exams.    Summary  Having a healthy lifestyle and getting preventive care can help to protect your health and wellness after age 65.  Screening and testing are the best way to find a health problem early and help you avoid having a fall. Early diagnosis and treatment give you the best chance for managing medical conditions that are more common for people who are older than age 65.  Falls are a major cause of broken bones and head injuries in people who are older than age 65. Take precautions to prevent a fall at home.  Work with your health care provider to learn what changes you can make to improve your health and wellness and to prevent falls.    This information is not intended to replace advice given to you by your health care provider. Make sure you discuss any questions you have with your health care provider.  Document Revised: 05/09/2022 Document Reviewed: 05/09/2022  [employer] Patient Education © 2023 [employer]  Updated 2/29/24 tc     Fall Prevention in the Home, Adult  Falls can cause injuries and affect people of all ages. There are many simple things that you can do to make your home safe and to help prevent falls.  If you need it, ask for help making these changes.  What actions can I take to prevent falls?  General information  Use good lighting in all rooms. Make sure to:  Replace any light bulbs that burn out.  Turn on lights if it is dark and use night-lights.  Keep items that you use often in easy-to-reach places. Lower the shelves around your home if needed.  Move furniture so that there are clear paths around it.  Do not keep throw rugs or other things on the floor that can make you trip.  If any of your floors are uneven, fix them.  Add color or contrast paint or tape to clearly kumar and help you see:  Grab bars or handrails.  First and last steps of staircases.  Where the edge of each step is.  If you use a ladder or stepladder:  Make sure  that it is fully opened. Do not climb a closed ladder.  Make sure the sides of the ladder are locked in place.  Have someone hold the ladder while you use it.  Know where your pets are as you move through your home.  What can I do in the bathroom?         Keep the floor dry. Clean up any water that is on the floor right away.  Remove soap buildup in the bathtub or shower. Buildup makes bathtubs and showers slippery.  Use non-skid mats or decals on the floor of the bathtub or shower.  Attach bath mats securely with double-sided, non-slip rug tape.  If you need to sit down while you are in the shower, use a non-slip stool.  Install grab bars by the toilet and in the bathtub and shower. Do not use towel bars as grab bars.  What can I do in the bedroom?  Make sure that you have a light by your bed that is easy to reach.  Do not use any sheets or blankets on your bed that hang to the floor.  Have a firm bench or chair with side arms that you can use for support when you get dressed.  What can I do in the kitchen?  Clean up any spills right away.  If you need to reach something above you, use a sturdy step stool that has a grab bar.  Keep electrical cables out of the way.  Do not use floor polish or wax that makes floors slippery.  What can I do with my stairs?  Do not leave anything on the stairs.  Make sure that you have a light switch at the top and the bottom of the stairs. Have them installed if you do not have them.  Make sure that there are handrails on both sides of the stairs. Fix handrails that are broken or loose. Make sure that handrails are as long as the staircases.  Install non-slip stair treads on all stairs in your home if they do not have carpet.  Avoid having throw rugs at the top or bottom of stairs, or secure the rugs with carpet tape to prevent them from moving.  Choose a carpet design that does not hide the edge of steps on the stairs. Make sure that carpet is firmly attached to the stairs. Fix any  carpet that is loose or worn.  What can I do on the outside of my home?  Use bright outdoor lighting.  Repair the edges of walkways and driveways and fix any cracks. Clear paths of anything that can make you trip, such as tools or rocks.  Add color or contrast paint or tape to clearly kumar and help you see high doorway thresholds.  Trim any bushes or trees on the main path into your home.  Check that handrails are securely fastened and in good repair. Both sides of all steps should have handrails.  Install guardrails along the edges of any raised decks or porches.  Have leaves, snow, and ice cleared regularly. Use sand, salt, or ice melt on walkways during winter months if you live where there is ice and snow.  In the garage, clean up any spills right away, including grease or oil spills.  What other actions can I take?  Review your medicines with your health care provider. Some medicines can make you confused or feel dizzy. This can increase your chance of falling.  Wear closed-toe shoes that fit well and support your feet. Wear shoes that have rubber soles and low heels.  Use a cane, walker, scooter, or crutches that help you move around if needed.  Talk with your provider about other ways that you can decrease your risk of falls. This may include seeing a physical therapist to learn to do exercises to improve movement and strength.  Where to find more information  Centers for Disease Control and PreventionCRISTIA[place]: cdc.gov  National [place] [employer] Aging: [place].nih.gov  National [place] on Aging: [place].nih.gov  Contact a health care provider if:  You are afraid of falling at home.  You feel weak, drowsy, or dizzy at home.  You fall at home.  Get help right away if you:  Lose consciousness or have trouble moving after a fall.  Have a fall that causes a head injury.  These symptoms may be an emergency. Get help right away. Call 911.  Do not wait to see if the symptoms will go away.  Do not drive yourself to the  [facility].  This information is not intended to replace advice given to you by your health care provider. Make sure you discuss any questions you have with your health care provider.  Document Revised: 08/21/2023 Document Reviewed: 08/21/2023  Elsevier Patient Education © 2024 [employer]    Sit-to-Stand Exercise    The sit-to-stand exercise (also known as the chair stand or chair rise exercise) strengthens your lower body and helps you maintain or improve your mobility and independence. The end goal is to do the sit-to-stand exercise without using your hands. This will be easier as you become stronger. You should always talk with your health care provider before starting any exercise program, especially if you have had recent surgery.  Do the exercise exactly as told by your health care provider and adjust it as directed. It is normal to feel mild stretching, pulling, tightness, or discomfort as you do this exercise, but you should stop right away if you feel sudden pain or your pain gets worse. Do not begin doing this exercise until told by your health care provider.  What the sit-to-stand exercise does  The sit-to-stand exercise helps to strengthen the muscles in your thighs and the muscles in the center of your body that give you stability (core muscles). This exercise is especially helpful if:  You have had knee or hip surgery.  You have trouble getting up from a chair, out of a car, or off the toilet due to muscle weakness.  How to do the sit-to-stand exercise  Sit toward the front edge of a sturdy chair without armrests. Your knees should be bent and your feet should be flat on the floor and shoulder-width apart and underneath your hips.  Place your hands lightly on each side of the seat. Keep your back and neck as straight as possible, with your chest slightly forward.  Breathe in slowly. Lean forward and slightly shift your weight to the front of your feet.  Breathe out as you slowly stand up. Try not to  support any weight with your hands.  Stand and pause for a full breath in and out.  Breathe in as you sit down slowly. Tighten your core and abdominal muscles to control your lowering as much as possible. You should lower yourself back to the chair slowly, not just drop back into the seat.  Breathe out slowly.  Do this exercise 10-15 times. If needed, do it fewer times until you build up strength.  Rest for 1 minute, then do another set of 10-15 repetitions.  To change the difficulty of the sit-to-stand exercise  If the exercise is too difficult, use a chair with sturdy armrests, and push off the armrests to help you come to the standing position. You can also use the armrests to help slowly lower yourself back to sitting. As this gets easier, try to use your arms less. You can also place a firm cushion or pillow on the chair to make the surface higher.  If this exercise is too easy, do not use your arms to help raise or lower yourself. You can also wear a weighted vest, use hand weights, increase your repetitions, or try a lower chair.  General tips  You may feel tired when starting an exercise routine. This is normal.  You may have muscle soreness that lasts a few days. This is normal. As you get stronger, you may not feel muscle soreness.  Use smooth, steady movements.  Do not  hold your breath during strength exercises. This can cause unsafe changes in your blood pressure.  Breathe in slowly through your nose, and breathe out slowly through your mouth.  Summary  Strengthening your lower body is an important step to help you move safely and independently.  The sit-to-stand exercise helps strengthen the muscles in your thighs and core.  You should always talk with your health care provider before starting any exercise program, especially if you have had recent surgery.  This information is not intended to replace advice given to you by your health care provider. Make sure you discuss any questions you have with  your health care provider.  Document Revised: 04/10/2022 Document Reviewed: 04/10/2022  [employer] Patient Education © 2024 [employer]    Exercising to Stay Healthy  To become healthy and stay healthy, it is recommended that you do moderate-intensity and vigorous-intensity exercise. You can tell that you are exercising at a moderate intensity if your heart starts beating faster and you start breathing faster but can still hold a conversation. You can tell that you are exercising at a vigorous intensity if you are breathing much harder and faster and cannot hold a conversation while exercising.  How can exercise benefit me?  Exercising regularly is important. It has many health benefits, such as:  Improving overall fitness, flexibility, and endurance.  Increasing bone density.  Helping with weight control.  Decreasing body fat.  Increasing muscle strength and endurance.  Reducing stress and tension, anxiety, depression, or anger.  Improving overall health.  What guidelines should I follow while exercising?  Before you start a new exercise program, talk with your health care provider.  Do not exercise so much that you hurt yourself, feel dizzy, or get very short of breath.  Wear comfortable clothes and wear shoes with good support.  Drink plenty of water while you exercise to prevent dehydration or heat stroke.  Work out until your breathing and your heartbeat get faster (moderate intensity).  How often should I exercise?  Choose an activity that you enjoy, and set realistic goals. Your health care provider can help you make an activity plan that is individually designed and works best for you.  Exercise regularly as told by your health care provider. This may include:  Doing strength training two times a week, such as:  Lifting weights.  Using resistance bands.  Push-ups.  Sit-ups.  Yoga.  Doing a certain intensity of exercise for a given amount of time. Choose from these options:  A total of 150 minutes of  moderate-intensity exercise every week.  A total of 75 minutes of vigorous-intensity exercise every week.  A mix of moderate-intensity and vigorous-intensity exercise every week.  Children, pregnant women, people who have not exercised regularly, people who are overweight, and older adults may need to talk with a health care provider about what activities are safe to perform. If you have a medical condition, be sure to talk with your health care provider before you start a new exercise program.  What are some exercise ideas?  Moderate-intensity exercise ideas include:  Walking 1 mile (1.6 km) in about 15 minutes.  Biking.  Hiking.  Golfing.  Dancing.  Water aerobics.  Vigorous-intensity exercise ideas include:  Walking 4.5 miles (7.2 km) or more in about 1 hour.  Jogging or running 5 miles (8 km) in about 1 hour.  Biking 10 miles (16.1 km) or more in about 1 hour.  Lap swimming.  Roller-skating or in-line skating.  Cross-country skiing.  Vigorous competitive sports, such as football, basketball, and soccer.  Jumping rope.  Aerobic dancing.  What are some everyday activities that can help me get exercise?  Yard work, such as:  Pushing a .  Raking and bagging leaves.  Washing your car.  Pushing a stroller.  Shoveling snow.  Gardening.  Washing windows or floors.  How can I be more active in my day-to-day activities?  Use stairs instead of an elevator.  Take a walk during your lunch break.  If you drive, park your car farther away from your work or school.  If you take public transportation, get off one stop early and walk the rest of the way.  Stand up or walk around during all of your indoor phone calls.  Get up, stretch, and walk around every 30 minutes throughout the day.  Enjoy exercise with a friend. Support to continue exercising will help you keep a regular routine of activity.  Where to find more information  You can find more information about exercising to stay healthy from:  [place] Department of  Health and Human Services: www.hhs.gov  Centers for Disease Control and Prevention (CDC): www.cdc.gov  Summary  Exercising regularly is important. It will improve your overall fitness, flexibility, and endurance.  Regular exercise will also improve your overall health. It can help you control your weight, reduce stress, and improve your bone density.  Do not exercise so much that you hurt yourself, feel dizzy, or get very short of breath.  Before you start a new exercise program, talk with your health care provider.  This information is not intended to replace advice given to you by your health care provider. Make sure you discuss any questions you have with your health care provider.  Document Revised: 04/15/2022 Document Reviewed: 04/15/2022  Elsevier Patient Education © 2024 Elsevier Inc.

## 2024-09-11 NOTE — ASSESSMENT & PLAN NOTE
Cardiology follow up Dr. Grajeda scheduled June 2025  Last note from Dr. Grajeda reviewed (6/17/24), all stable, follow up in a year.

## 2024-09-27 DIAGNOSIS — I48.20 ATRIAL FIBRILLATION, CHRONIC: ICD-10-CM

## 2024-09-27 DIAGNOSIS — E11.65 TYPE 2 DIABETES MELLITUS WITH HYPERGLYCEMIA, WITHOUT LONG-TERM CURRENT USE OF INSULIN: ICD-10-CM

## 2024-09-27 RX ORDER — APIXABAN 5 MG/1
TABLET, FILM COATED ORAL
Qty: 180 TABLET | Refills: 0 | Status: SHIPPED | OUTPATIENT
Start: 2024-09-27

## 2024-09-27 RX ORDER — ATORVASTATIN CALCIUM 20 MG/1
TABLET, FILM COATED ORAL
Qty: 90 TABLET | Refills: 1 | OUTPATIENT
Start: 2024-09-27

## 2024-10-23 DIAGNOSIS — E11.65 TYPE 2 DIABETES MELLITUS WITH HYPERGLYCEMIA, WITHOUT LONG-TERM CURRENT USE OF INSULIN: ICD-10-CM

## 2024-10-23 RX ORDER — ATORVASTATIN CALCIUM 20 MG/1
TABLET, FILM COATED ORAL
Qty: 90 TABLET | Refills: 1 | OUTPATIENT
Start: 2024-10-23

## 2024-11-25 ENCOUNTER — TELEPHONE (OUTPATIENT)
Dept: INTERNAL MEDICINE | Facility: CLINIC | Age: 69
End: 2024-11-25
Payer: MEDICARE

## 2024-11-25 NOTE — TELEPHONE ENCOUNTER
Caller: EDGAR BROOKS [facility]    Relationship:     Best call back number: [phone]   What form or medical record are you requesting: MEDICATION ASSISTANCE    Who is requesting this form or medical record from you: [facility]    How would you like to receive the form or medical records (pick-up, mail, fax):   If fax, what is the fax number:   Timeframe paperwork needed: ASAP    Additional notes: PAPERWORK NEEDS REDONE FOR MEDICATION ASSISTANCE, IT IS  AND UNABLE TO CONTACT PATIENT

## 2024-11-25 NOTE — TELEPHONE ENCOUNTER
*Pietro. Spoke with rep. bishop Blankenship. Was told that they need an updated application with a date within the last 90 days. 10/15/24 and up would be for next year. He can reprint the paperwork. They have been unable to reach him. What we sent has April's date and that is too old.

## 2024-12-09 ENCOUNTER — HOSPITAL ENCOUNTER (OUTPATIENT)
Dept: GENERAL RADIOLOGY | Facility: HOSPITAL | Age: 69
Discharge: HOME | End: 2024-12-09
Admitting: FAMILY MEDICINE
Payer: MEDICARE

## 2024-12-09 ENCOUNTER — OFFICE VISIT (OUTPATIENT)
Dept: INTERNAL MEDICINE | Facility: CLINIC | Age: 69
End: 2024-12-09
Payer: MEDICARE

## 2024-12-09 VITALS
HEART RATE: 52 BPM | SYSTOLIC BLOOD PRESSURE: 112 MMHG | DIASTOLIC BLOOD PRESSURE: 74 MMHG | OXYGEN SATURATION: 96 % | HEIGHT: 70 IN | TEMPERATURE: 96.9 F | BODY MASS INDEX: 29.66 KG/M2 | WEIGHT: 207.2 LBS

## 2024-12-09 DIAGNOSIS — E11.59 TYPE 2 DIABETES MELLITUS WITH OTHER CIRCULATORY COMPLICATION, WITHOUT LONG-TERM CURRENT USE OF INSULIN: Primary | ICD-10-CM

## 2024-12-09 DIAGNOSIS — G47.9 TROUBLE IN SLEEPING: ICD-10-CM

## 2024-12-09 DIAGNOSIS — R22.41 MASS OF RIGHT FOOT: ICD-10-CM

## 2024-12-09 DIAGNOSIS — M62.838 MUSCLE SPASM: ICD-10-CM

## 2024-12-09 DIAGNOSIS — I10 ESSENTIAL HYPERTENSION: ICD-10-CM

## 2024-12-09 DIAGNOSIS — Z87.448: ICD-10-CM

## 2024-12-09 LAB
EXPIRATION DATE: ABNORMAL
HBA1C MFR BLD: 6.8 % (ref 4.5–5.7)
Lab: ABNORMAL

## 2024-12-09 PROCEDURE — 83036 HEMOGLOBIN GLYCOSYLATED A1C: CPT | Performed by: FAMILY MEDICINE

## 2024-12-09 PROCEDURE — 1159F MED LIST DOCD IN RCRD: CPT | Performed by: FAMILY MEDICINE

## 2024-12-09 PROCEDURE — 3074F SYST BP LT 130 MM HG: CPT | Performed by: FAMILY MEDICINE

## 2024-12-09 PROCEDURE — 1126F AMNT PAIN NOTED NONE PRSNT: CPT | Performed by: FAMILY MEDICINE

## 2024-12-09 PROCEDURE — 3044F HG A1C LEVEL LT 7.0%: CPT | Performed by: FAMILY MEDICINE

## 2024-12-09 PROCEDURE — 3078F DIAST BP <80 MM HG: CPT | Performed by: FAMILY MEDICINE

## 2024-12-09 PROCEDURE — 99214 OFFICE O/P EST MOD 30 MIN: CPT | Performed by: FAMILY MEDICINE

## 2024-12-09 PROCEDURE — 73630 X-RAY EXAM OF FOOT: CPT

## 2024-12-09 PROCEDURE — 1160F RVW MEDS BY RX/DR IN RCRD: CPT | Performed by: FAMILY MEDICINE

## 2024-12-09 PROCEDURE — G2211 COMPLEX E/M VISIT ADD ON: HCPCS | Performed by: FAMILY MEDICINE

## 2024-12-09 RX ORDER — TRAZODONE HYDROCHLORIDE 50 MG/1
50 TABLET, FILM COATED ORAL NIGHTLY
Qty: 90 TABLET | Refills: 3 | Status: SHIPPED | OUTPATIENT
Start: 2024-12-09

## 2024-12-09 NOTE — PROGRESS NOTES
Chief Complaint  Diabetes  Answers submitted by the patient for this visit:  Primary Reason for Visit (Submitted on 12/2/2024)  What is the primary reason for your visit?: Diabetes  Diabetes Questionnaire (Submitted on 12/2/2024)  Chief Complaint: Diabetes problem  Below 70: never    Subjective        Senate MARILYNN Capellan presents to [facility]  History of Present Illness  Flomax--need refill? Doesn't feel any different off of it x 2-3 weeks. Up once nightly to urinate. Remembers when he started Flomax had some improvement with urination but overall now feels same as he did on med. Didn't want to have to go back to see specialist and pay copay if not needed.    Mass on right foot in arch. No known injuries. Painful to walk on at times. New in last few weeks.    Diabetes  He presents for his follow-up diabetic visit. He has type 2 diabetes mellitus. No MedicAlert identification noted. The initial diagnosis of diabetes was made 10 years ago. Hypoglycemia symptoms include sweats and tremors. Pertinent negatives for hypoglycemia include no confusion, headaches or speech difficulty. Associated symptoms include polydipsia and polyuria. Pertinent negatives for diabetes include no blurred vision, no foot paresthesias, no foot ulcerations and no weight loss. Pertinent negatives for hypoglycemia complications include no blackouts, no hospitalization, no nocturnal hypoglycemia, no required assistance and no required glucagon injection. Symptoms are stable. He is compliant with treatment all of the time. He is following a diabetic, generally healthy and high fiber diet. Meal planning includes carbohydrate counting, avoidance of concentrated sweets and low carbohydrate diet. He participates in exercise three times a week. He monitors blood glucose at home 1-2 x per day. His highest blood glucose is 140-180 mg/dl. An ACE inhibitor/angiotensin II receptor blocker is being taken. He does not see a  "podiatrist. Eye exam is current.   Additional comments: Last few weeks has been taking half of Jardiance 25 mg at night with Farxiga 10 mg in am thought it would help sugars    Improved diet habits. More vegetables. More complex carbs such as sweet potatoes. No bread, no white potatoes.       Objective   Vital Signs:  /74   Pulse 52   Temp 96.9 °F (36.1 °C)   Ht 177.8 cm (70\")   Wt 94 kg (207 lb 3.2 oz)   SpO2 96%   BMI 29.73 kg/m²   Estimated body mass index is 29.73 kg/m² as calculated from the following:    Height as of this encounter: 177.8 cm (70\").    Weight as of this encounter: 94 kg (207 lb 3.2 oz).            Physical Exam  Vitals and nursing note reviewed.   Constitutional:       General: He is not in acute distress.     Appearance: Normal appearance. He is well-developed and well-groomed. He is not ill-appearing, toxic-appearing or diaphoretic.   HENT:      Head: Normocephalic and atraumatic.      Right Ear: Hearing normal.      Left Ear: Hearing normal.   Eyes:      General: Lids are normal. No scleral icterus.        Right eye: No discharge.         Left eye: No discharge.      Extraocular Movements: Extraocular movements intact.   Cardiovascular:      Rate and Rhythm: Normal rate and regular rhythm.   Pulmonary:      Effort: Pulmonary effort is normal.   Musculoskeletal:      Cervical back: Neck supple.      Right foot: Deformity (bony mass first metatarsal middle arch) and bony tenderness (area of mass) present.   Skin:     Coloration: Skin is not jaundiced or pale.   Neurological:      General: No focal deficit present.      Mental Status: He is alert and oriented to person, place, and time.   Psychiatric:         Attention and Perception: Attention and perception normal.         Mood and Affect: Mood and affect normal.         Speech: Speech normal.         Behavior: Behavior normal. Behavior is cooperative.         Thought Content: Thought content normal.         Cognition and Memory: " Cognition and memory normal.         Judgment: Judgment normal.        Result Review :  The following data was reviewed by: Cindy Zhang MD on 12/09/2024:  Lab Results   Component Value Date    HGBA1C 6.8 (A) 12/09/2024    HGBA1C 7.8 (A) 09/11/2024    HGBA1C 7.70 (H) 05/24/2024               Assessment and Plan   Diagnoses and all orders for this visit:    1. Type 2 diabetes mellitus with other circulatory complication, without long-term current use of insulin (Primary)  Overview:  Associated with hypertension, hyperlipidemia, Afib     Assessment & Plan:  Diabetes is improving with lifestyle modifications.   Continue current treatment regimen.  Monitor for hypoglycemia. If occurs lower glimepiride down from 3 to 2 pills. Only take Farxiga 10 mg (getting via patient assistance), do not take Jardiance with it (has been doing in recent weeks). Continue with healthier diet choices.   Diabetes will be reassessed in 3 months    Orders:  -     POC Glycosylated Hemoglobin (Hb A1C)    2. Essential hypertension  Assessment & Plan:  Hypertension is stable and controlled  Continue current treatment regimen.  Patient has had some fatigue, could be due to low blood pressure at times. Stop the extra Jardiance, that should help with blood pressure. With diet changes we may need to downward titrate medicines if weight declines.  Blood pressure will be reassessed in 3 months.      3. Mass of right foot  -     XR Foot 3+ View Right; Future    4. Muscle spasm  -     tiZANidine (ZANAFLEX) 4 MG tablet; Take 1 tablet by mouth At Night As Needed for Muscle Spasms.  Dispense: 90 tablet; Refill: 3    5. Trouble in sleeping  -     traZODone (DESYREL) 50 MG tablet; Take 1 tablet by mouth Every Night.  Dispense: 90 tablet; Refill: 3    6. History of dribbling of urine  Comments:  no issues with urination at this time, no reason to resume flomax.              Follow Up   Return in about 3 months (around 3/14/2025) for Diabetes follow up,  A1C in office.  Patient was given instructions and counseling regarding his condition or for health maintenance advice. Please see specific information pulled into the AVS if appropriate.

## 2024-12-09 NOTE — ASSESSMENT & PLAN NOTE
Diabetes is improving with lifestyle modifications.   Continue current treatment regimen.  Monitor for hypoglycemia. If occurs lower glimepiride down from 3 to 2 pills. Only take Farxiga 10 mg (getting via patient assistance), do not take Jardiance with it (has been doing in recent weeks). Continue with healthier diet choices.   Diabetes will be reassessed in 3 months

## 2024-12-09 NOTE — ASSESSMENT & PLAN NOTE
Hypertension is stable and controlled  Continue current treatment regimen.  Patient has had some fatigue, could be due to low blood pressure at times. Stop the extra Jardiance, that should help with blood pressure. With diet changes we may need to downward titrate medicines if weight declines.  Blood pressure will be reassessed in 3 months.

## 2025-01-31 ENCOUNTER — OFFICE VISIT (OUTPATIENT)
Dept: INTERNAL MEDICINE | Facility: CLINIC | Age: 70
End: 2025-01-31
Payer: MEDICARE

## 2025-01-31 VITALS
HEIGHT: 70 IN | TEMPERATURE: 98.7 F | HEART RATE: 77 BPM | OXYGEN SATURATION: 96 % | SYSTOLIC BLOOD PRESSURE: 118 MMHG | DIASTOLIC BLOOD PRESSURE: 72 MMHG | BODY MASS INDEX: 29.78 KG/M2 | WEIGHT: 208 LBS | RESPIRATION RATE: 22 BRPM

## 2025-01-31 DIAGNOSIS — J10.1 INFLUENZA B: Primary | ICD-10-CM

## 2025-01-31 DIAGNOSIS — R11.2 NAUSEA AND VOMITING, UNSPECIFIED VOMITING TYPE: ICD-10-CM

## 2025-01-31 LAB
EXPIRATION DATE: ABNORMAL
FLUAV AG UPPER RESP QL IA.RAPID: NOT DETECTED
FLUBV AG UPPER RESP QL IA.RAPID: DETECTED
INTERNAL CONTROL: ABNORMAL
Lab: ABNORMAL
SARS-COV-2 AG UPPER RESP QL IA.RAPID: NOT DETECTED

## 2025-01-31 PROCEDURE — 87428 SARSCOV & INF VIR A&B AG IA: CPT | Performed by: STUDENT IN AN ORGANIZED HEALTH CARE EDUCATION/TRAINING PROGRAM

## 2025-01-31 PROCEDURE — 1159F MED LIST DOCD IN RCRD: CPT | Performed by: STUDENT IN AN ORGANIZED HEALTH CARE EDUCATION/TRAINING PROGRAM

## 2025-01-31 PROCEDURE — 1160F RVW MEDS BY RX/DR IN RCRD: CPT | Performed by: STUDENT IN AN ORGANIZED HEALTH CARE EDUCATION/TRAINING PROGRAM

## 2025-01-31 PROCEDURE — 3078F DIAST BP <80 MM HG: CPT | Performed by: STUDENT IN AN ORGANIZED HEALTH CARE EDUCATION/TRAINING PROGRAM

## 2025-01-31 PROCEDURE — 3074F SYST BP LT 130 MM HG: CPT | Performed by: STUDENT IN AN ORGANIZED HEALTH CARE EDUCATION/TRAINING PROGRAM

## 2025-01-31 PROCEDURE — 1126F AMNT PAIN NOTED NONE PRSNT: CPT | Performed by: STUDENT IN AN ORGANIZED HEALTH CARE EDUCATION/TRAINING PROGRAM

## 2025-01-31 PROCEDURE — 99213 OFFICE O/P EST LOW 20 MIN: CPT | Performed by: STUDENT IN AN ORGANIZED HEALTH CARE EDUCATION/TRAINING PROGRAM

## 2025-01-31 RX ORDER — PROMETHAZINE HYDROCHLORIDE 25 MG/1
25 TABLET ORAL EVERY 6 HOURS PRN
Qty: 30 TABLET | Refills: 0 | Status: SHIPPED | OUTPATIENT
Start: 2025-01-31

## 2025-01-31 RX ORDER — PROMETHAZINE HYDROCHLORIDE 25 MG/1
25 TABLET ORAL EVERY 6 HOURS PRN
Qty: 30 TABLET | Refills: 0 | Status: SHIPPED | OUTPATIENT
Start: 2025-01-31 | End: 2025-01-31 | Stop reason: SDUPTHER

## 2025-01-31 RX ORDER — ONDANSETRON 8 MG/1
8 TABLET, ORALLY DISINTEGRATING ORAL EVERY 8 HOURS PRN
Qty: 30 TABLET | Refills: 0 | Status: SHIPPED | OUTPATIENT
Start: 2025-01-31

## 2025-01-31 RX ORDER — ONDANSETRON 8 MG/1
8 TABLET, ORALLY DISINTEGRATING ORAL EVERY 8 HOURS PRN
Qty: 30 TABLET | Refills: 0 | Status: SHIPPED | OUTPATIENT
Start: 2025-01-31 | End: 2025-01-31 | Stop reason: SDUPTHER

## 2025-01-31 NOTE — PROGRESS NOTES
Subjective   Senjanae Capellan is a 69 y.o. male.     History of Present Illness  Presents with one day of nausea, vomiting, watery diarrhea, fatigue, generalized abdominal pain. Able to drink and keep fluids down. Has not had appetite.       The following portions of the patient's history were reviewed and updated as appropriate: allergies, current medications, past family history, past medical history, past social history, past surgical history, and problem list.    Review of Systems   All other systems reviewed and are negative.      Objective   Physical Exam  Vitals and nursing note reviewed.   Constitutional:       Appearance: Normal appearance. He is normal weight.   HENT:      Head: Normocephalic and atraumatic.      Right Ear: External ear normal.      Left Ear: External ear normal.      Nose: Nose normal.      Mouth/Throat:      Mouth: Mucous membranes are moist.      Pharynx: Oropharynx is clear.   Eyes:      Extraocular Movements: Extraocular movements intact.      Conjunctiva/sclera: Conjunctivae normal.      Pupils: Pupils are equal, round, and reactive to light.   Cardiovascular:      Rate and Rhythm: Normal rate and regular rhythm.      Pulses: Normal pulses.      Heart sounds: Normal heart sounds. No murmur heard.     No gallop.   Pulmonary:      Effort: Pulmonary effort is normal. No respiratory distress.   Abdominal:      General: Abdomen is flat. Bowel sounds are normal.      Palpations: Abdomen is soft.   Musculoskeletal:         General: Normal range of motion.      Cervical back: Normal range of motion and neck supple. No rigidity or tenderness.   Lymphadenopathy:      Cervical: No cervical adenopathy.   Skin:     General: Skin is warm.      Capillary Refill: Capillary refill takes less than 2 seconds.      Coloration: Skin is not jaundiced or pale.      Findings: No bruising, erythema, lesion or rash.   Neurological:      General: No focal deficit present.      Mental Status: He is alert and  oriented to person, place, and time. Mental status is at baseline.   Psychiatric:         Mood and Affect: Mood normal.         Behavior: Behavior normal.         Thought Content: Thought content normal.         Judgment: Judgment normal.         Assessment & Plan   Diagnoses and all orders for this visit:    1. Influenza B (Primary)  -     Discontinue: ondansetron ODT (ZOFRAN-ODT) 8 MG disintegrating tablet; Place 1 tablet on the tongue Every 8 (Eight) Hours As Needed for Nausea or Vomiting.  Dispense: 30 tablet; Refill: 0  -     Discontinue: promethazine (PHENERGAN) 25 MG tablet; Take 1 tablet by mouth Every 6 (Six) Hours As Needed for Nausea or Vomiting.  Dispense: 30 tablet; Refill: 0  -     promethazine (PHENERGAN) 25 MG tablet; Take 1 tablet by mouth Every 6 (Six) Hours As Needed for Nausea or Vomiting.  Dispense: 30 tablet; Refill: 0  -     ondansetron ODT (ZOFRAN-ODT) 8 MG disintegrating tablet; Place 1 tablet on the tongue Every 8 (Eight) Hours As Needed for Nausea or Vomiting.  Dispense: 30 tablet; Refill: 0    2. Nausea and vomiting, unspecified vomiting type  -     POCT SARS-CoV-2 Antigen DONOVAN + Flu       Flu b positive  Declines tamiflu due to gi side effects of medication  Counseled on symptom management and oral hydration

## 2025-02-14 DIAGNOSIS — Z96.641 STATUS POST TOTAL HIP REPLACEMENT, RIGHT: Primary | ICD-10-CM

## 2025-02-17 ENCOUNTER — OFFICE VISIT (OUTPATIENT)
Dept: ORTHOPEDIC SURGERY | Facility: CLINIC | Age: 70
End: 2025-02-17
Payer: MEDICARE

## 2025-02-17 VITALS
HEIGHT: 70 IN | SYSTOLIC BLOOD PRESSURE: 128 MMHG | BODY MASS INDEX: 29.78 KG/M2 | DIASTOLIC BLOOD PRESSURE: 72 MMHG | WEIGHT: 208 LBS

## 2025-02-17 DIAGNOSIS — M16.12 PRIMARY OSTEOARTHRITIS OF LEFT HIP: ICD-10-CM

## 2025-02-17 DIAGNOSIS — Z96.641 STATUS POST TOTAL HIP REPLACEMENT, RIGHT: Primary | ICD-10-CM

## 2025-03-10 ENCOUNTER — OFFICE VISIT (OUTPATIENT)
Dept: INTERNAL MEDICINE | Facility: CLINIC | Age: 70
End: 2025-03-10
Payer: MEDICARE

## 2025-03-10 VITALS
HEART RATE: 64 BPM | OXYGEN SATURATION: 97 % | BODY MASS INDEX: 29.52 KG/M2 | SYSTOLIC BLOOD PRESSURE: 132 MMHG | TEMPERATURE: 96.9 F | WEIGHT: 206.2 LBS | DIASTOLIC BLOOD PRESSURE: 78 MMHG | HEIGHT: 70 IN

## 2025-03-10 DIAGNOSIS — E78.5 DYSLIPIDEMIA: ICD-10-CM

## 2025-03-10 DIAGNOSIS — R79.9 ABNORMAL BLOOD CHEMISTRY: ICD-10-CM

## 2025-03-10 DIAGNOSIS — R39.15 BENIGN PROSTATIC HYPERPLASIA (BPH) WITH URINARY URGENCY: ICD-10-CM

## 2025-03-10 DIAGNOSIS — G63 NEUROPATHY DUE TO MEDICAL CONDITION: ICD-10-CM

## 2025-03-10 DIAGNOSIS — Z12.5 PROSTATE CANCER SCREENING: ICD-10-CM

## 2025-03-10 DIAGNOSIS — R22.41 MASS OF RIGHT FOOT: ICD-10-CM

## 2025-03-10 DIAGNOSIS — N40.1 BENIGN PROSTATIC HYPERPLASIA (BPH) WITH URINARY URGENCY: ICD-10-CM

## 2025-03-10 DIAGNOSIS — E11.59 TYPE 2 DIABETES MELLITUS WITH OTHER CIRCULATORY COMPLICATION, WITHOUT LONG-TERM CURRENT USE OF INSULIN: Primary | ICD-10-CM

## 2025-03-10 DIAGNOSIS — Z51.81 MEDICATION MONITORING ENCOUNTER: ICD-10-CM

## 2025-03-10 DIAGNOSIS — I10 ESSENTIAL HYPERTENSION: ICD-10-CM

## 2025-03-10 DIAGNOSIS — I25.10 CVD (CARDIOVASCULAR DISEASE): ICD-10-CM

## 2025-03-10 DIAGNOSIS — M79.671 FOOT PAIN, RIGHT: ICD-10-CM

## 2025-03-10 DIAGNOSIS — R23.8 OTHER SKIN CHANGES: ICD-10-CM

## 2025-03-10 LAB
EXPIRATION DATE: ABNORMAL
EXPIRATION DATE: NORMAL
HBA1C MFR BLD: 7 % (ref 4.5–5.7)
Lab: ABNORMAL
Lab: NORMAL
POC ALBUMIN, URINE: 10 MG/L
POC CREATININE, URINE: 50 MG/DL
POC URINE ALB/CREA RATIO: <30

## 2025-03-10 PROCEDURE — 3078F DIAST BP <80 MM HG: CPT | Performed by: FAMILY MEDICINE

## 2025-03-10 PROCEDURE — 1126F AMNT PAIN NOTED NONE PRSNT: CPT | Performed by: FAMILY MEDICINE

## 2025-03-10 PROCEDURE — 83036 HEMOGLOBIN GLYCOSYLATED A1C: CPT | Performed by: FAMILY MEDICINE

## 2025-03-10 PROCEDURE — 3051F HG A1C>EQUAL 7.0%<8.0%: CPT | Performed by: FAMILY MEDICINE

## 2025-03-10 PROCEDURE — 3075F SYST BP GE 130 - 139MM HG: CPT | Performed by: FAMILY MEDICINE

## 2025-03-10 PROCEDURE — 1159F MED LIST DOCD IN RCRD: CPT | Performed by: FAMILY MEDICINE

## 2025-03-10 PROCEDURE — 82044 UR ALBUMIN SEMIQUANTITATIVE: CPT | Performed by: FAMILY MEDICINE

## 2025-03-10 PROCEDURE — 99214 OFFICE O/P EST MOD 30 MIN: CPT | Performed by: FAMILY MEDICINE

## 2025-03-10 PROCEDURE — 1160F RVW MEDS BY RX/DR IN RCRD: CPT | Performed by: FAMILY MEDICINE

## 2025-03-10 PROCEDURE — 82570 ASSAY OF URINE CREATININE: CPT | Performed by: FAMILY MEDICINE

## 2025-03-10 RX ORDER — GABAPENTIN 300 MG/1
300 CAPSULE ORAL 3 TIMES DAILY PRN
Qty: 270 CAPSULE | Refills: 1 | Status: SHIPPED | OUTPATIENT
Start: 2025-03-10

## 2025-03-10 RX ORDER — TAMSULOSIN HYDROCHLORIDE 0.4 MG/1
1 CAPSULE ORAL DAILY
Qty: 90 CAPSULE | Refills: 1 | Status: SHIPPED | OUTPATIENT
Start: 2025-03-10

## 2025-03-10 NOTE — ASSESSMENT & PLAN NOTE
Diabetes is stable.   Continue current treatment regimen.  Diabetes will be reassessed in 3 months, labs prior to next appointment (6/10 or after)

## 2025-03-10 NOTE — ASSESSMENT & PLAN NOTE
Blood pressure elevated compared to his normal today  Continue current medicines  With urinary issues can consider d/C HCTZ next visit  Monitor home blood pressure if possible, having some nonspecific fatigue, possibly blood pressure going low?

## 2025-03-10 NOTE — PROGRESS NOTES
"Chief Complaint  Diabetes    Subjective        Senate MARILYNN aCpellan presents to [facility]  History of Present Illness    Objective   Vital Signs:  /78   Pulse 64   Temp 96.9 °F (36.1 °C)   Ht 177.8 cm (70\")   Wt 93.5 kg (206 lb 3.2 oz)   SpO2 97%   BMI 29.59 kg/m²   Estimated body mass index is 29.59 kg/m² as calculated from the following:    Height as of this encounter: 177.8 cm (70\").    Weight as of this encounter: 93.5 kg (206 lb 3.2 oz).            Physical Exam  Vitals and nursing note reviewed.   Constitutional:       General: He is not in acute distress.     Appearance: Normal appearance. He is well-developed and well-groomed. He is not ill-appearing, toxic-appearing or diaphoretic.   HENT:      Head: Normocephalic and atraumatic.      Right Ear: Hearing normal.      Left Ear: Hearing normal.   Eyes:      General: Lids are normal. No scleral icterus.        Right eye: No discharge.         Left eye: No discharge.      Extraocular Movements: Extraocular movements intact.   Pulmonary:      Effort: Pulmonary effort is normal.   Musculoskeletal:      Cervical back: Neck supple.      Right foot: Deformity (bony prominence similar to last visit) present.   Skin:     Coloration: Skin is not jaundiced or pale.   Neurological:      General: No focal deficit present.      Mental Status: He is alert and oriented to person, place, and time.      Gait: Gait abnormal.   Psychiatric:         Attention and Perception: Attention and perception normal.         Mood and Affect: Mood and affect normal.         Speech: Speech normal.         Behavior: Behavior normal. Behavior is cooperative.         Thought Content: Thought content normal.         Cognition and Memory: Cognition and memory normal.         Judgment: Judgment normal.        Result Review :  The following data was reviewed by: Cindy Zhang MD on 03/10/2025:  Lab Results   Component Value Date    HGBA1C 7.0 (A) " 03/10/2025    HGBA1C 6.8 (A) 12/09/2024    HGBA1C 7.8 (A) 09/11/2024      Office Visit on 03/10/2025   Component Date Value Ref Range Status    POC ALBUMIN, URINE 03/10/2025 10  mg/L Final    POC CREATININE, URINE 03/10/2025 50  mg/dL Final    POC Urine Albumin Creatinine Ratio 03/10/2025 <30  <30 Final    Lot Number 03/10/2025 [id number]   Final    Expiration Date 03/10/2025 11/30/2025   Final    Hemoglobin A1C 03/10/2025 7.0 (A)  4.5 - 5.7 % Final    Lot Number 03/10/2025 [id number]   Final    Expiration Date 03/10/2025 11/19/2026   Final              Assessment and Plan   Diagnoses and all orders for this visit:    1. Type 2 diabetes mellitus with other circulatory complication, without long-term current use of insulin (Primary)  Overview:  Associated with hypertension, hyperlipidemia, Afib     Assessment & Plan:  Diabetes is stable.   Continue current treatment regimen.  Diabetes will be reassessed in 3 months, labs prior to next appointment (6/10 or after)    Orders:  -     POC Albumin/Creatinine Ratio Urine  -     POC Glycosylated Hemoglobin (Hb A1C)  -     gabapentin (NEURONTIN) 300 MG capsule; Take 1 capsule by mouth 3 (Three) Times a Day As Needed (neuropathy/nerve pain).  Dispense: 270 capsule; Refill: 1  -     Hemoglobin A1c; Future  -     Comprehensive Metabolic Panel; Future    2. Essential hypertension  Assessment & Plan:  Blood pressure elevated compared to his normal today  Continue current medicines  With urinary issues can consider d/C HCTZ next visit  Monitor home blood pressure if possible, having some nonspecific fatigue, possibly blood pressure going low?    Orders:  -     Uric Acid; Future  -     CBC (No Diff); Future    3. Neuropathy due to medical condition  Comments:  due to diabetes  Orders:  -     gabapentin (NEURONTIN) 300 MG capsule; Take 1 capsule by mouth 3 (Three) Times a Day As Needed (neuropathy/nerve pain).  Dispense: 270 capsule; Refill: 1    4. Benign prostatic hyperplasia (BPH)  with urinary urgency  Comments:  resume flomax, if doesn't help consider referral to urology (Aurelio)  Orders:  -     tamsulosin (FLOMAX) 0.4 MG capsule 24 hr capsule; Take 1 capsule by mouth Daily.  Dispense: 90 capsule; Refill: 1    5. Foot pain, right  Comments:  if his orthopedic provider cannot address will refer to podiatry  Orders:  -     Ambulatory Referral to Orthopedic Surgery  -     Uric Acid; Future    6. Mass of right foot  -     Ambulatory Referral to Orthopedic Surgery    7. Other skin changes  Comments:  referral back to dermatology per patient request  Orders:  -     Ambulatory Referral to Dermatology    8. Prostate cancer screening  -     PSA Screen; Future    9. Dyslipidemia  -     Comprehensive Metabolic Panel; Future  -     Lipid Panel; Future    10. CVD (cardiovascular disease)  -     Comprehensive Metabolic Panel; Future  -     CBC (No Diff); Future  -     Lipid Panel; Future    11. Abnormal blood chemistry  -     Uric Acid; Future  -     Hemoglobin A1c; Future  -     Comprehensive Metabolic Panel; Future  -     CBC (No Diff); Future  -     Lipid Panel; Future    12. Medication monitoring encounter  -     Uric Acid; Future  -     Hemoglobin A1c; Future  -     Comprehensive Metabolic Panel; Future  -     CBC (No Diff); Future  -     Lipid Panel; Future              Follow Up   Return in about 3 months (around 6/17/2025) for Diabetes follow up, fastin glabs 6/10 or after prior to appt.  Patient was given instructions and counseling regarding his condition or for health maintenance advice. Please see specific information pulled into the AVS if appropriate.

## 2025-03-28 ENCOUNTER — TELEPHONE (OUTPATIENT)
Dept: ORTHOPEDIC SURGERY | Facility: CLINIC | Age: 70
End: 2025-03-28
Payer: MEDICARE

## 2025-03-28 NOTE — TELEPHONE ENCOUNTER
Patient called the office requesting a Left hip injection at the hospital. He is wanting to see if any way possible he could get one done on a Friday with either provider. He says he has a hard time taking off work.

## 2025-04-17 DIAGNOSIS — I10 ESSENTIAL HYPERTENSION: ICD-10-CM

## 2025-04-17 DIAGNOSIS — I48.20 ATRIAL FIBRILLATION, CHRONIC: ICD-10-CM

## 2025-04-17 RX ORDER — METOPROLOL TARTRATE 50 MG
50 TABLET ORAL EVERY 12 HOURS SCHEDULED
Qty: 180 TABLET | Refills: 3 | Status: SHIPPED | OUTPATIENT
Start: 2025-04-17

## 2025-04-24 ENCOUNTER — HOSPITAL ENCOUNTER (OUTPATIENT)
Dept: GENERAL RADIOLOGY | Facility: HOSPITAL | Age: 70
Discharge: HOME | End: 2025-04-24
Payer: MEDICARE

## 2025-04-24 PROCEDURE — 25010000002 LIDOCAINE 1 % SOLUTION: Performed by: ORTHOPAEDIC SURGERY

## 2025-04-24 PROCEDURE — 77002 NEEDLE LOCALIZATION BY XRAY: CPT

## 2025-04-24 PROCEDURE — 25010000002 METHYLPREDNISOLONE PER 80 MG: Performed by: ORTHOPAEDIC SURGERY

## 2025-04-24 RX ORDER — LIDOCAINE HYDROCHLORIDE 10 MG/ML
5 INJECTION, SOLUTION INFILTRATION; PERINEURAL ONCE
Status: COMPLETED | OUTPATIENT
Start: 2025-04-24 | End: 2025-04-24

## 2025-04-24 RX ORDER — METHYLPREDNISOLONE ACETATE 80 MG/ML
80 INJECTION, SUSPENSION INTRA-ARTICULAR; INTRALESIONAL; INTRAMUSCULAR; SOFT TISSUE ONCE
Status: COMPLETED | OUTPATIENT
Start: 2025-04-24 | End: 2025-04-24

## 2025-04-24 RX ADMIN — METHYLPREDNISOLONE ACETATE 80 MG: 80 INJECTION, SUSPENSION INTRA-ARTICULAR; INTRALESIONAL; INTRAMUSCULAR; SOFT TISSUE at 14:06

## 2025-04-24 RX ADMIN — LIDOCAINE HYDROCHLORIDE 5 ML: 10 INJECTION, SOLUTION INFILTRATION; PERINEURAL at 14:05

## 2025-04-24 NOTE — POST-PROCEDURE NOTE
[facility]  [address], [address]  [place] [address]  [phone]        PROCEDURE REPORT        DIAGNOSIS:  Left hip osteoarthritis, symptomatic    PROCEDURE: Left hip injection under flouroscopy      Yehuda Capellan with date of birth 1955  presents to [facility] Radiology Department today for injection therapy.        Patient presents to [facility] Radiology Department Flouroscopy Suite on 4/24/2025 for planned elective left hip injection under flouroscopy for symptomatic osteoarthritis.    Procedure:     After consent was obtained, and using ethyl chloride topical local anesthetic, the left hip was then prepped and draped with sterile technique. With an anterior hip approach, flouroscopy guidance, and care to stay lateral of the femoral artery, the hip joint was entered via a 20 gauge spinal needle.  An image was saved of the needle within the hip joint space.  A mixture of 80 mg methylprednisolone in one ml plus 5 ml of 1% plain Lidocaine was injected and the needle withdrawn and a band aid applied. The procedure was well tolerated and without complication.  The patient did remain stable and with baseline ambulation. The patient is asked to avoid stressful physical activity for a day or two before resuming full regular activities.  There might be some soreness initially and patient to call for any adverse effect of the injection treatment.  Call or return to clinic if any fever, swelling, persistent pain, lack of anticipated improvement or other symptoms or concerns.    Impression: Symptomatic left hip osteoarthritis.      Recommendations/Plan:      Treatment and patient advice as noted here and in office visit report.  Orthopedic activities and goals reviewed and patient expressed appreciation.  Call or notify for any adverse effect from injection therapy.    Exercise: As tolerated.  No strenuous activity for a few days as appropriate.  Activity:  May perform usual  activities as tolerated.      Patient will return to our clinic at scheduled appointment.  Patient agreeable to call or return sooner for any concerns.

## 2025-05-07 DIAGNOSIS — E11.65 TYPE 2 DIABETES MELLITUS WITH HYPERGLYCEMIA, WITHOUT LONG-TERM CURRENT USE OF INSULIN: ICD-10-CM

## 2025-05-07 RX ORDER — GLIMEPIRIDE 1 MG/1
3 TABLET ORAL
Qty: 270 TABLET | Refills: 3 | Status: SHIPPED | OUTPATIENT
Start: 2025-05-07

## 2025-05-07 NOTE — TELEPHONE ENCOUNTER
"Caller: Yehuda Capellan \"Ray\"    Relationship: Self    Best call back number: [phone]    Requested Prescriptions:   Requested Prescriptions     Pending Prescriptions Disp Refills    glimepiride (Amaryl) 1 MG tablet 270 tablet 3     Sig: Take 3 tablets by mouth Every Morning Before Breakfast.        Pharmacy where request should be sent: [facility] - [place] [address] - [phone]  - [phone]      Last office visit with prescribing clinician: 3/10/2025   Last telemedicine visit with prescribing clinician: Visit date not found   Next office visit with prescribing clinician: 6/13/2025     Additional details provided by patient: PATIENT IS COMPLETELY OUT    Does the patient have less than a 3 day supply:  [x] Yes  [] No    Would you like a call back once the refill request has been completed: [] Yes [x] No    If the office needs to give you a call back, can they leave a voicemail: [] Yes [x] No    Flaco Peterson Rep   05/07/25 10:12 EDT         "

## 2025-05-28 ENCOUNTER — TELEPHONE (OUTPATIENT)
Dept: INTERNAL MEDICINE | Facility: CLINIC | Age: 70
End: 2025-05-28
Payer: MEDICARE

## 2025-05-28 NOTE — TELEPHONE ENCOUNTER
Caller: JENNIFER WITH [facility]    Relationship:     Best call back number: [phone]     What orders are you requesting (i.e. lab or imaging): GLUCOSE MONITOR    In what timeframe would the patient need to come in: ASAP    Where will you receive your lab/imaging services: THROUGH [facility]    Additional notes: [phone]

## 2025-06-09 ENCOUNTER — TELEPHONE (OUTPATIENT)
Dept: INTERNAL MEDICINE | Facility: CLINIC | Age: 70
End: 2025-06-09
Payer: MEDICARE

## 2025-06-09 NOTE — TELEPHONE ENCOUNTER
Caller:  [facility]    Relationship:     Best call back number: [phone] [facility] FOR PROCESSING DEPT    What is the best time to reach you: ANY    Who are you requesting to speak with (clinical staff, provider,  specific staff member): NURSE    Do you know the name of the person who called: CONCHA    What was the call regarding: FAX WAS NOT RECEIVED OF PRESCRIPTION FOR GLUCOSE MONITOR.  THEY DID RECEIVE THE RECORDS.  PLEASE FAX [phone].    Is it okay if the provider responds through "[employer]hart: CALL IF NEEDED

## 2025-06-11 DIAGNOSIS — I48.20 ATRIAL FIBRILLATION, CHRONIC: ICD-10-CM

## 2025-06-11 NOTE — TELEPHONE ENCOUNTER
"    Caller: Yehuda Capellan \"Ray\"    Relationship: Self    Best call back number: [phone]    Requested Prescriptions:   Requested Prescriptions     Pending Prescriptions Disp Refills    apixaban (Eliquis) 5 MG tablet tablet 180 tablet 0        Pharmacy where request should be sent: [address] [phone] [facility] [phone] FX     Last office visit with prescribing clinician: 6/17/2024   Last telemedicine visit with prescribing clinician: Visit date not found   Next office visit with prescribing clinician: 6/18/2025       Does the patient have less than a 3 day supply:  [x] Yes  [] No        Flaco Jacobsen Rep   06/11/25 11:27 EDT       "

## 2025-06-11 NOTE — TELEPHONE ENCOUNTER
2 months of Eliquis sent to pharmacy on file.  He will need to have labs drawn per  [facility] safety protocol for additional refills.

## 2025-06-17 ENCOUNTER — TELEPHONE (OUTPATIENT)
Dept: INTERNAL MEDICINE | Facility: CLINIC | Age: 70
End: 2025-06-17
Payer: MEDICARE

## 2025-06-17 NOTE — TELEPHONE ENCOUNTER
Caller: PATTIE(.[facility])    Relationship: Other    Best call back number: [phone]     What form or medical record are you requesting: MOST RECENT APPOINTMENT NOTES, AND ORDER FORM FOR THE PATIENTS CGM SUPPLIES.    Who is requesting this form or medical record from you: [facility]     How would you like to receive the form or medical records (pick-up, mail, fax): FAX   [phone]    Timeframe paperwork needed: AS SOON AS POSSIBLE     Additional notes: PLEASE CALL IF AND WHEN THIS CAN BE DONE.  PATTIE WILL BE FAXING TO OFFICE 6/17 AS WELL.

## 2025-06-18 ENCOUNTER — OFFICE VISIT (OUTPATIENT)
Dept: CARDIOLOGY | Facility: CLINIC | Age: 70
End: 2025-06-18
Payer: MEDICARE

## 2025-06-18 VITALS
RESPIRATION RATE: 20 BRPM | OXYGEN SATURATION: 98 % | HEIGHT: 70 IN | WEIGHT: 204.4 LBS | BODY MASS INDEX: 29.26 KG/M2 | SYSTOLIC BLOOD PRESSURE: 118 MMHG | DIASTOLIC BLOOD PRESSURE: 78 MMHG | HEART RATE: 66 BPM

## 2025-06-18 DIAGNOSIS — I10 ESSENTIAL HYPERTENSION: ICD-10-CM

## 2025-06-18 DIAGNOSIS — E78.5 DYSLIPIDEMIA: ICD-10-CM

## 2025-06-18 DIAGNOSIS — I48.20 ATRIAL FIBRILLATION, CHRONIC: ICD-10-CM

## 2025-06-18 DIAGNOSIS — E11.65 TYPE 2 DIABETES MELLITUS WITH HYPERGLYCEMIA, WITHOUT LONG-TERM CURRENT USE OF INSULIN: ICD-10-CM

## 2025-06-18 DIAGNOSIS — I34.0 NONRHEUMATIC MITRAL VALVE REGURGITATION: Primary | ICD-10-CM

## 2025-06-18 PROCEDURE — 3078F DIAST BP <80 MM HG: CPT | Performed by: INTERNAL MEDICINE

## 2025-06-18 PROCEDURE — 99213 OFFICE O/P EST LOW 20 MIN: CPT | Performed by: INTERNAL MEDICINE

## 2025-06-18 PROCEDURE — 93000 ELECTROCARDIOGRAM COMPLETE: CPT | Performed by: INTERNAL MEDICINE

## 2025-06-18 PROCEDURE — 3074F SYST BP LT 130 MM HG: CPT | Performed by: INTERNAL MEDICINE

## 2025-06-18 RX ORDER — ATORVASTATIN CALCIUM 20 MG/1
20 TABLET, FILM COATED ORAL NIGHTLY
Qty: 90 TABLET | Refills: 3 | Status: SHIPPED | OUTPATIENT
Start: 2025-06-18

## 2025-06-18 NOTE — PROGRESS NOTES
[facility] Cardiology Office Follow Up Note    Yehuda Capellan  [id number]  2025    Primary Care Provider: Cindy Zhang MD    Chief Complaint: Routine follow-up    History of Present Illness:   Mr. Yehuda Capellan is a 70y.o. male who presents to the Cardiology Clinic for routine follow-up.  The patient has a past medical history significant for type 2 diabetes mellitus, hypertension, and hyperlipidemia.  He has a past cardiac history significant for permanent atrial fibrillation.  He is chronically anticoagulated with Eliquis for CVA prophylaxis.  Since last follow-up, the patient has done well from a cardiac standpoint.  His atrial fibrillation remains asymptomatic.  He has been tolerating Eliquis without significant bleeding or bruising.  He reports his blood pressure has been well-controlled.  He remains active without chest pain or exertional chest discomfort.  No specific complaints today.     Past Cardiac Testin. Last Coronary Angio: None  2. Prior Stress Testin, no evidence of inducible ischemia  3. Last Echo:   1.  Normal left ventricular size and systolic function, LVEF 55-60%.  2.  Mild concentric LVH.  3.  Grade 1 diastolic dysfunction.  4.  Severely increased left atrial volume index.  5.  Normal right ventricular size and systolic function.  6.  Mild calcification aortic valve without significant stenosis.  7.  Trace AI.  8.  Mild MR.  4. Prior Holter Monitor: None    Review of Systems:   Review of Systems   Constitutional:  Negative for activity change, appetite change, chills, diaphoresis, fatigue, fever, unexpected weight gain and unexpected weight loss.   Eyes:  Negative for blurred vision and double vision.   Respiratory:  Negative for cough, chest tightness, shortness of breath and wheezing.    Cardiovascular:  Negative for chest pain, palpitations and leg swelling.   Gastrointestinal:  Negative for abdominal pain, anal bleeding, blood  in stool and GERD.   Endocrine: Negative for cold intolerance and heat intolerance.   Genitourinary:  Negative for hematuria.   Neurological:  Negative for dizziness, syncope, weakness and light-headedness.   Hematological:  Does not bruise/bleed easily.   Psychiatric/Behavioral:  Negative for depressed mood and stress. The patient is not nervous/anxious.        I have reviewed and/or updated the patient's past medical, past surgical, family, social history, problem list and allergies as appropriate.     Medications:     Current Outpatient Medications:     apixaban (Eliquis) 5 MG tablet tablet, Take 1 tablet by mouth Every 12 (Twelve) Hours., Disp: 60 tablet, Rfl: 1    atorvastatin (LIPITOR) 20 MG tablet, Take 1 tablet by mouth Every Night. To lower cholesterol and risk of stroke., Disp: 90 tablet, Rfl: 3    Blood Glucose Calibration (Accu-Chek Darcy) solution, Use as directed. E11.9, Disp: 1 each, Rfl: 3    cetirizine (zyrTEC) 10 MG tablet, Take 1 tablet by mouth Daily., Disp: , Rfl:     Cyanocobalamin (Vitamin B-12) 1000 MCG sublingual tablet, Place 1 tablet under the tongue Daily., Disp: , Rfl:     dapagliflozin Propanediol (Farxiga) 10 MG tablet, Take 10 mg by mouth Daily., Disp: 90 tablet, Rfl: 3    gabapentin (NEURONTIN) 300 MG capsule, Take 1 capsule by mouth 3 (Three) Times a Day As Needed (neuropathy/nerve pain)., Disp: 270 capsule, Rfl: 1    glimepiride (Amaryl) 1 MG tablet, Take 3 tablets by mouth Every Morning Before Breakfast., Disp: 270 tablet, Rfl: 3    losartan-hydrochlorothiazide (HYZAAR) 100-12.5 MG per tablet, Take 1 tablet by mouth Daily. Indications: High Blood Pressure Disorder, Disp: 90 tablet, Rfl: 3    metoprolol tartrate (LOPRESSOR) 50 MG tablet, TAKE ONE TABLET BY MOUTH TWICE A DAY, Disp: 180 tablet, Rfl: 3    multivitamin with minerals tablet tablet, Take 1 tablet by mouth Daily., Disp: , Rfl:     ondansetron ODT (ZOFRAN-ODT) 8 MG disintegrating tablet, Place 1 tablet on the tongue Every  "8 (Eight) Hours As Needed for Nausea or Vomiting., Disp: 30 tablet, Rfl: 0    tiZANidine (ZANAFLEX) 4 MG tablet, Take 1 tablet by mouth At Night As Needed for Muscle Spasms., Disp: 90 tablet, Rfl: 3    traZODone (DESYREL) 50 MG tablet, Take 1 tablet by mouth Every Night., Disp: 90 tablet, Rfl: 3    glucose blood (Accu-Chek Darcy Plus) test strip, CHECK GLUCOSE TWO TIMES A  DAY FOR DIABETES MELLITUS (Patient taking differently: CHECK GLUCOSE TWO TIMES A  DAY FOR DIABETES MELLITUS), Disp: 200 each, Rfl: 2    glucose monitor monitoring kit, ACCU-CHECK DARCY PLUS METER as requested., Disp: 1 each, Rfl: 1    Lancets Misc. misc, TEST TWICE A DAY FOR DM. E11.9. patient requests acc-check softclick, Disp: 200 each, Rfl: 3    tamsulosin (FLOMAX) 0.4 MG capsule 24 hr capsule, Take 1 capsule by mouth Daily. (Patient not taking: Reported on 6/18/2025), Disp: 90 capsule, Rfl: 1    Physical Exam:  Vital Signs:   Vitals:    06/18/25 0840   BP: 118/78   BP Location: Right arm   Patient Position: Sitting   Cuff Size: Adult   Pulse: 66   Resp: 20   SpO2: 98%   Weight: 92.7 kg (204 lb 6.4 oz)   Height: 177.8 cm (70\")       Physical Exam  Constitutional:       General: He is not in acute distress.     Appearance: Normal appearance. He is not diaphoretic.   HENT:      Head: Normocephalic and atraumatic.   Cardiovascular:      Rate and Rhythm: Normal rate. Rhythm irregular.      Heart sounds: No murmur heard.  Pulmonary:      Effort: Pulmonary effort is normal. No respiratory distress.      Breath sounds: Normal breath sounds. No stridor. No wheezing, rhonchi or rales.   Abdominal:      General: Bowel sounds are normal. There is no distension.      Palpations: Abdomen is soft.      Tenderness: There is no abdominal tenderness. There is no guarding or rebound.   Musculoskeletal:         General: No swelling. Normal range of motion.      Cervical back: Neck supple. No tenderness.   Skin:     General: Skin is warm and dry.   Neurological: "      General: No focal deficit present.      Mental Status: He is alert and oriented to person, place, and time.   Psychiatric:         Mood and Affect: Mood normal.         Behavior: Behavior normal.         Results Review:   I reviewed the patient's new clinical results.      ECG 12 Lead    Date/Time: 6/18/2025 9:17 AM  Performed by: Cruzito Grajeda MD    Authorized by: Cruzito Grajeda MD  Comparison: not compared with previous ECG   Rhythm: atrial fibrillation  Rate: normal  Conduction: right bundle branch block  QRS axis: normal    Clinical impression: abnormal EKG          Assessment / Plan:     1. Atrial fibrillation, permanent   -- ECG today shows atrial fibrillation remains rate controlled  -- Asymptomatic  --Continue Eliquis for CVA prophylaxis  -- Continue metoprolol for rate control  --Follow-up in 1 year, sooner if required     2.  Moderate mitral regurgitation  -- Last echocardiogram showed mitral regurgitation was mild  -- Surveillance echocardiogram on follow-up in 1 year     3. Primary hypertension  -- BP remains adequately controlled     4. Pure hypercholesterolemia  -- Continue statin     5. Type 2 diabetes mellitus   -- Managed by PCP    Follow Up:   Return in about 1 year (around 6/18/2026).      Thank you for allowing me to participate in the care of your patient. Please to not hesitate to contact me with additional questions or concerns.     CAROLE Grajeda MD  Interventional Cardiology   06/18/2025  08:50 EDT

## 2025-06-23 ENCOUNTER — OFFICE VISIT (OUTPATIENT)
Dept: INTERNAL MEDICINE | Facility: CLINIC | Age: 70
End: 2025-06-23
Payer: MEDICARE

## 2025-06-23 VITALS
OXYGEN SATURATION: 96 % | HEART RATE: 70 BPM | HEIGHT: 70 IN | TEMPERATURE: 97.8 F | BODY MASS INDEX: 30.03 KG/M2 | DIASTOLIC BLOOD PRESSURE: 64 MMHG | SYSTOLIC BLOOD PRESSURE: 114 MMHG | WEIGHT: 209.8 LBS

## 2025-06-23 DIAGNOSIS — I25.10 CVD (CARDIOVASCULAR DISEASE): ICD-10-CM

## 2025-06-23 DIAGNOSIS — Z51.81 MEDICATION MONITORING ENCOUNTER: ICD-10-CM

## 2025-06-23 DIAGNOSIS — E11.65 TYPE 2 DIABETES MELLITUS WITH HYPERGLYCEMIA, WITHOUT LONG-TERM CURRENT USE OF INSULIN: ICD-10-CM

## 2025-06-23 DIAGNOSIS — N39.43 DRIBBLING URINE: ICD-10-CM

## 2025-06-23 DIAGNOSIS — E11.59 TYPE 2 DIABETES MELLITUS WITH OTHER CIRCULATORY COMPLICATION, WITHOUT LONG-TERM CURRENT USE OF INSULIN: Primary | ICD-10-CM

## 2025-06-23 DIAGNOSIS — E78.5 DYSLIPIDEMIA: ICD-10-CM

## 2025-06-23 DIAGNOSIS — R79.9 ABNORMAL BLOOD CHEMISTRY: ICD-10-CM

## 2025-06-23 DIAGNOSIS — Z91.81 AT HIGH RISK FOR FALLS: ICD-10-CM

## 2025-06-23 DIAGNOSIS — I10 ESSENTIAL HYPERTENSION: ICD-10-CM

## 2025-06-23 PROCEDURE — 1159F MED LIST DOCD IN RCRD: CPT | Performed by: FAMILY MEDICINE

## 2025-06-23 PROCEDURE — 3051F HG A1C>EQUAL 7.0%<8.0%: CPT | Performed by: FAMILY MEDICINE

## 2025-06-23 PROCEDURE — 1160F RVW MEDS BY RX/DR IN RCRD: CPT | Performed by: FAMILY MEDICINE

## 2025-06-23 PROCEDURE — 99214 OFFICE O/P EST MOD 30 MIN: CPT | Performed by: FAMILY MEDICINE

## 2025-06-23 PROCEDURE — 1126F AMNT PAIN NOTED NONE PRSNT: CPT | Performed by: FAMILY MEDICINE

## 2025-06-23 PROCEDURE — 3078F DIAST BP <80 MM HG: CPT | Performed by: FAMILY MEDICINE

## 2025-06-23 PROCEDURE — 3074F SYST BP LT 130 MM HG: CPT | Performed by: FAMILY MEDICINE

## 2025-06-23 PROCEDURE — G2211 COMPLEX E/M VISIT ADD ON: HCPCS | Performed by: FAMILY MEDICINE

## 2025-06-23 RX ORDER — DAPAGLIFLOZIN 10 MG/1
10 TABLET, FILM COATED ORAL DAILY
Qty: 90 TABLET | Refills: 3 | Status: SHIPPED | OUTPATIENT
Start: 2025-06-23

## 2025-06-23 RX ORDER — LOSARTAN POTASSIUM AND HYDROCHLOROTHIAZIDE 12.5; 1 MG/1; MG/1
1 TABLET ORAL DAILY
Qty: 90 TABLET | Refills: 3 | Status: SHIPPED | OUTPATIENT
Start: 2025-06-23

## 2025-06-23 NOTE — LETTER
June 23, 2025   [facility]    Component      Latest Ref Rn 3/10/2025 6/18/2025   Hemoglobin A1C      4.80 - 5.60 % 7.0 !  7.30 (H)       Component      Latest Ref Rn 5/24/2024 6/18/2025   LDL Cholesterol       0 - 100 mg/dL 55  116 (H)    Total Cholesterol      0 - 200 mg/dL 112  172    Triglycerides      0 - 150 mg/dL 83  102    HDL Cholesterol      40 - 60 mg/dL 40  37 (L)    VLDL Cholesterol Craig      5 - 40 mg/dL 17  19     The 10-year ASCVD risk score (Jennie NETTLES, et al., 2019) is: 38%    Values used to calculate the score:      Age: 70 years      Sex: Male      Is Non- : No      Diabetic: Yes      Tobacco smoker: No      Systolic Blood Pressure: 118 mmHg      Is BP treated: Yes      HDL Cholesterol: 37 mg/dL      Total Cholesterol: 172 mg/dL       Component      Latest Ref Rn 6/18/2025   PSA      0.000 - 4.000 ng/mL 1.560        Component      Latest Ref Rn 6/18/2025   Uric Acid      3.4 - 7.0 mg/dL 5.0            Component      Latest Ref Rn 6/18/2025   Glucose      65 - 99 mg/dL 155 (H)    BUN      8.0 - 23.0 mg/dL 22.0    Creatinine      0.76 - 1.27 mg/dL 0.85    BUN/Creatinine Ratio      7.0 - 25.0  25.9 (H)    Sodium      136 - 145 mmol/L 142    Potassium      3.5 - 5.2 mmol/L 4.8    Chloride      98 - 107 mmol/L 103    CO2      22.0 - 29.0 mmol/L 27.1    Calcium      8.6 - 10.5 mg/dL 9.9    Total Protein      6.0 - 8.5 g/dL 7.1    Albumin      3.5 - 5.2 g/dL 4.6    Globulin      gm/dL 2.5    A/G Ratio      g/dL 1.8    Total Bilirubin      0.0 - 1.2 mg/dL 0.8    Alkaline Phosphatase      39 - 117 U/L 61    AST (SGOT)      1 - 40 U/L 20    ALT (SGPT)      1 - 41 U/L 19    EGFR Result      >60.0 mL/min/1.73 92.3       Component      Latest Ref Rn 6/18/2025   WBC      3.40 - 10.80 10*3/mm3 8.43    RBC      4.14 - 5.80 10*6/mm3 5.84 (H)    Hemoglobin      13.0 - 17.7 g/dL 17.4    Hematocrit      37.5 - 51.0 % 52.1 (H)    MCV      79.0 - 97.0 fL 89.2    MCH      26.6 - 33.0 pg  29.8    MCHC      31.5 - 35.7 g/dL 33.4    RDW      12.3 - 15.4 % 12.4    Platelets      140 - 450 10*3/mm3 265

## 2025-06-23 NOTE — PATIENT INSTRUCTIONS
Fall Prevention in the Home, Adult  Falls can cause injuries and affect people of all ages. There are many simple things that you can do to make your home safe and to help prevent falls.  If you need it, ask for help making these changes.  What actions can I take to prevent falls?  General information  Use good lighting in all rooms. Make sure to:  Replace any light bulbs that burn out.  Turn on lights if it is dark and use night-lights.  Keep items that you use often in easy-to-reach places. Lower the shelves around your home if needed.  Move furniture so that there are clear paths around it.  Do not keep throw rugs or other things on the floor that can make you trip.  If any of your floors are uneven, fix them.  Add color or contrast paint or tape to clearly kumar and help you see:  Grab bars or handrails.  First and last steps of staircases.  Where the edge of each step is.  If you use a ladder or stepladder:  Make sure that it is fully opened. Do not climb a closed ladder.  Make sure the sides of the ladder are locked in place.  Have someone hold the ladder while you use it.  Know where your pets are as you move through your home.  What can I do in the bathroom?         Keep the floor dry. Clean up any water that is on the floor right away.  Remove soap buildup in the bathtub or shower. Buildup makes bathtubs and showers slippery.  Use non-skid mats or decals on the floor of the bathtub or shower.  Attach bath mats securely with double-sided, non-slip rug tape.  If you need to sit down while you are in the shower, use a non-slip stool.  Install grab bars by the toilet and in the bathtub and shower. Do not use towel bars as grab bars.  What can I do in the bedroom?  Make sure that you have a light by your bed that is easy to reach.  Do not use any sheets or blankets on your bed that hang to the floor.  Have a firm bench or chair with side arms that you can use for support when you get dressed.  What can I do in  the kitchen?  Clean up any spills right away.  If you need to reach something above you, use a sturdy step stool that has a grab bar.  Keep electrical cables out of the way.  Do not use floor polish or wax that makes floors slippery.  What can I do with my stairs?  Do not leave anything on the stairs.  Make sure that you have a light switch at the top and the bottom of the stairs. Have them installed if you do not have them.  Make sure that there are handrails on both sides of the stairs. Fix handrails that are broken or loose. Make sure that handrails are as long as the staircases.  Install non-slip stair treads on all stairs in your home if they do not have carpet.  Avoid having throw rugs at the top or bottom of stairs, or secure the rugs with carpet tape to prevent them from moving.  Choose a carpet design that does not hide the edge of steps on the stairs. Make sure that carpet is firmly attached to the stairs. Fix any carpet that is loose or worn.  What can I do on the outside of my home?  Use bright outdoor lighting.  Repair the edges of walkways and driveways and fix any cracks. Clear paths of anything that can make you trip, such as tools or rocks.  Add color or contrast paint or tape to clearly kumar and help you see high doorway thresholds.  Trim any bushes or trees on the main path into your home.  Check that handrails are securely fastened and in good repair. Both sides of all steps should have handrails.  Install guardrails along the edges of any raised decks or porches.  Have leaves, snow, and ice cleared regularly. Use sand, salt, or ice melt on walkways during winter months if you live where there is ice and snow.  In the garage, clean up any spills right away, including grease or oil spills.  What other actions can I take?  Review your medicines with your health care provider. Some medicines can make you confused or feel dizzy. This can increase your chance of falling.  Wear closed-toe shoes that  fit well and support your feet. Wear shoes that have rubber soles and low heels.  Use a cane, walker, scooter, or crutches that help you move around if needed.  Talk with your provider about other ways that you can decrease your risk of falls. This may include seeing a physical therapist to learn to do exercises to improve movement and strength.  Where to find more information  Centers for Disease Control and Prevention, [place]: cdc.gov  National [place] on Aging: shel[place].nih.gov  National Golden on Aging: shelby.nih.gov  Contact a health care provider if:  You are afraid of falling at home.  You feel weak, drowsy, or dizzy at home.  You fall at home.  Get help right away if you:  Lose consciousness or have trouble moving after a fall.  Have a fall that causes a head injury.  These symptoms may be an emergency. Get help right away. Call 911.  Do not wait to see if the symptoms will go away.  Do not drive yourself to the hospital.  This information is not intended to replace advice given to you by your health care provider. Make sure you discuss any questions you have with your health care provider.  Document Revised: 08/21/2023 Document Reviewed: 08/21/2023  [employer] Patient Education © 2024 [employer]  Sit-to-Stand Exercise    The sit-to-stand exercise (also known as the chair stand or chair rise exercise) strengthens your lower body and helps you maintain or improve your mobility and independence. The end goal is to do the sit-to-stand exercise without using your hands. This will be easier as you become stronger. You should always talk with your health care provider before starting any exercise program, especially if you have had recent surgery.  Do the exercise exactly as told by your health care provider and adjust it as directed. It is normal to feel mild stretching, pulling, tightness, or discomfort as you do this exercise, but you should stop right away if you feel sudden pain or your pain gets worse. Do not  begin doing this exercise until told by your health care provider.  What the sit-to-stand exercise does  The sit-to-stand exercise helps to strengthen the muscles in your thighs and the muscles in the center of your body that give you stability (core muscles). This exercise is especially helpful if:  You have had knee or hip surgery.  You have trouble getting up from a chair, out of a car, or off the toilet due to muscle weakness.  How to do the sit-to-stand exercise  Sit toward the front edge of a sturdy chair without armrests. Your knees should be bent and your feet should be flat on the floor and shoulder-width apart and underneath your hips.  Place your hands lightly on each side of the seat. Keep your back and neck as straight as possible, with your chest slightly forward.  Breathe in slowly. Lean forward and slightly shift your weight to the front of your feet.  Breathe out as you slowly stand up. Try not to support any weight with your hands.  Stand and pause for a full breath in and out.  Breathe in as you sit down slowly. Tighten your core and abdominal muscles to control your lowering as much as possible. You should lower yourself back to the chair slowly, not just drop back into the seat.  Breathe out slowly.  Do this exercise 10-15 times. If needed, do it fewer times until you build up strength.  Rest for 1 minute, then do another set of 10-15 repetitions.  To change the difficulty of the sit-to-stand exercise  If the exercise is too difficult, use a chair with sturdy armrests, and push off the armrests to help you come to the standing position. You can also use the armrests to help slowly lower yourself back to sitting. As this gets easier, try to use your arms less. You can also place a firm cushion or pillow on the chair to make the surface higher.  If this exercise is too easy, do not use your arms to help raise or lower yourself. You can also wear a weighted vest, use hand weights, increase your  repetitions, or try a lower chair.  General tips  You may feel tired when starting an exercise routine. This is normal.  You may have muscle soreness that lasts a few days. This is normal. As you get stronger, you may not feel muscle soreness.  Use smooth, steady movements.  Do not  hold your breath during strength exercises. This can cause unsafe changes in your blood pressure.  Breathe in slowly through your nose, and breathe out slowly through your mouth.  Summary  Strengthening your lower body is an important step to help you move safely and independently.  The sit-to-stand exercise helps strengthen the muscles in your thighs and core.  You should always talk with your health care provider before starting any exercise program, especially if you have had recent surgery.  This information is not intended to replace advice given to you by your health care provider. Make sure you discuss any questions you have with your health care provider.  Document Revised: 04/10/2022 Document Reviewed: 04/10/2022  [employer] Patient Education © 2024 [employer]  Exercising to Stay Healthy  To become healthy and stay healthy, it is recommended that you do moderate-intensity and vigorous-intensity exercise. You can tell that you are exercising at a moderate intensity if your heart starts beating faster and you start breathing faster but can still hold a conversation. You can tell that you are exercising at a vigorous intensity if you are breathing much harder and faster and cannot hold a conversation while exercising.  How can exercise benefit me?  Exercising regularly is important. It has many health benefits, such as:  Improving overall fitness, flexibility, and endurance.  Increasing bone density.  Helping with weight control.  Decreasing body fat.  Increasing muscle strength and endurance.  Reducing stress and tension, anxiety, depression, or anger.  Improving overall health.  What guidelines should I follow while  exercising?  Before you start a new exercise program, talk with your health care provider.  Do not exercise so much that you hurt yourself, feel dizzy, or get very short of breath.  Wear comfortable clothes and wear shoes with good support.  Drink plenty of water while you exercise to prevent dehydration or heat stroke.  Work out until your breathing and your heartbeat get faster (moderate intensity).  How often should I exercise?  Choose an activity that you enjoy, and set realistic goals. Your health care provider can help you make an activity plan that is individually designed and works best for you.  Exercise regularly as told by your health care provider. This may include:  Doing strength training two times a week, such as:  Lifting weights.  Using resistance bands.  Push-ups.  Sit-ups.  Yoga.  Doing a certain intensity of exercise for a given amount of time. Choose from these options:  A total of 150 minutes of moderate-intensity exercise every week.  A total of 75 minutes of vigorous-intensity exercise every week.  A mix of moderate-intensity and vigorous-intensity exercise every week.  Children, pregnant women, people who have not exercised regularly, people who are overweight, and older adults may need to talk with a health care provider about what activities are safe to perform. If you have a medical condition, be sure to talk with your health care provider before you start a new exercise program.  What are some exercise ideas?  Moderate-intensity exercise ideas include:  Walking 1 mile (1.6 km) in about 15 minutes.  Biking.  Hiking.  Golfing.  Dancing.  Water aerobics.  Vigorous-intensity exercise ideas include:  Walking 4.5 miles (7.2 km) or more in about 1 hour.  Jogging or running 5 miles (8 km) in about 1 hour.  Biking 10 miles (16.1 km) or more in about 1 hour.  Lap swimming.  Roller-skating or in-line skating.  Cross-country skiing.  Vigorous competitive sports, such as football, basketball, and  soccer.  Jumping rope.  Aerobic dancing.  What are some everyday activities that can help me get exercise?  Yard work, such as:  Pushing a .  Raking and bagging leaves.  Washing your car.  Pushing a stroller.  Shoveling snow.  Gardening.  Washing windows or floors.  How can I be more active in my day-to-day activities?  Use stairs instead of an elevator.  Take a walk during your lunch break.  If you drive, park your car farther away from your work or school.  If you take public transportation, get off one stop early and walk the rest of the way.  Stand up or walk around during all of your indoor phone calls.  Get up, stretch, and walk around every 30 minutes throughout the day.  Enjoy exercise with a friend. Support to continue exercising will help you keep a regular routine of activity.  Where to find more information  You can find more information about exercising to stay healthy from:  [place] Department of Health and Human Services: www.hhs.gov  Centers for Disease Control and Prevention (CDC): www.cdc.gov  Summary  Exercising regularly is important. It will improve your overall fitness, flexibility, and endurance.  Regular exercise will also improve your overall health. It can help you control your weight, reduce stress, and improve your bone density.  Do not exercise so much that you hurt yourself, feel dizzy, or get very short of breath.  Before you start a new exercise program, talk with your health care provider.  This information is not intended to replace advice given to you by your health care provider. Make sure you discuss any questions you have with your health care provider.  Document Revised: 04/15/2022 Document Reviewed: 04/15/2022  [employer] Patient Education © 2024 [employer]

## 2025-06-23 NOTE — PROGRESS NOTES
"Chief Complaint  Diabetes    Subjective        Senate MARILYNN Capellan presents to [facility]  History of Present Illness  A1C up some as was LDL  Patient ran out of statin and glimepiride for a period and has had trouble getting these from his  mail order. Back on.    Urinary dribbling ongoing  Flomax wasn't helpful  Diabetes  Visit type:  Follow-up  Diabetes type:  Type 2  MedicAlert ID: no    Disease duration:  10 years  Associated symptoms:     polydipsia, polyuria and weight loss      no blurred vision, no foot paresthesias and no foot ulcerations    Symptom course:  Stable  Hypoglycemia symptoms:     tremors      no confusion, no headaches, no speech difficulty and no sweats    Hypoglycemia complications:     no blackouts, no hospitalization, no nocturnal hypoglycemia, no required assistance and no required glucagon injection    Treatment compliance:  All of the time  Monitoring regimen:     Home blood tests:  1-2 x per day  Highest range:  130-140  Current diet:  Diabetic and generally healthy  Meal planning:  Avoidance of concentrated sweets  Exercise:  Weekly  ACE-I / ARB:  Is being taken  Eye exam current: yes    Sees podiatrist: yes    Answers submitted by the patient for this visit:  Diabetes Questionnaire (Submitted on 6/16/2025)  Chief Complaint: Diabetes problem  Below 70: never      Objective   Vital Signs:  /64   Pulse 70   Temp 97.8 °F (36.6 °C)   Ht 177.8 cm (70\")   Wt 95.2 kg (209 lb 12.8 oz)   SpO2 96%   BMI 30.10 kg/m²   Estimated body mass index is 30.1 kg/m² as calculated from the following:    Height as of this encounter: 177.8 cm (70\").    Weight as of this encounter: 95.2 kg (209 lb 12.8 oz).          Physical Exam  Vitals and nursing note reviewed.   Constitutional:       General: He is not in acute distress.     Appearance: Normal appearance. He is well-developed and well-groomed. He is not ill-appearing, toxic-appearing or diaphoretic.   HENT:      " Head: Normocephalic and atraumatic.      Right Ear: Hearing normal.      Left Ear: Hearing normal.   Eyes:      General: Lids are normal. No scleral icterus.        Right eye: No discharge.         Left eye: No discharge.      Extraocular Movements: Extraocular movements intact.   Pulmonary:      Effort: Pulmonary effort is normal.   Musculoskeletal:      Cervical back: Neck supple.   Skin:     Coloration: Skin is not jaundiced or pale.   Neurological:      General: No focal deficit present.      Mental Status: He is alert and oriented to person, place, and time.   Psychiatric:         Attention and Perception: Attention and perception normal.         Mood and Affect: Mood and affect normal.         Speech: Speech normal.         Behavior: Behavior normal. Behavior is cooperative.         Thought Content: Thought content normal.         Cognition and Memory: Cognition and memory normal.         Judgment: Judgment normal.          Result Review :  The following data was reviewed by: Cindy Zhang MD on 04/16/2025:  Letter from Cindy Zhang MD (06/23/2025)   Progress Notes by Cruzito Grajeda MD (06/18/2025 08:45)            Assessment and Plan   Diagnoses and all orders for this visit:    1. Type 2 diabetes mellitus with other circulatory complication, without long-term current use of insulin (Primary)  Overview:  Associated with hypertension, hyperlipidemia, Afib     Orders:  -     Comprehensive Metabolic Panel; Future  -     Hemoglobin A1c; Future    2. Type 2 diabetes mellitus with hyperglycemia, without long-term current use of insulin  -     dapagliflozin Propanediol (Farxiga) 10 MG tablet; Take 10 mg by mouth Daily.  Dispense: 90 tablet; Refill: 3  -     Hemoglobin A1c; Future    3. Essential hypertension  -     losartan-hydrochlorothiazide (HYZAAR) 100-12.5 MG per tablet; Take 1 tablet by mouth Daily. Indications: High Blood Pressure  Dispense: 90 tablet; Refill: 3  -     CBC (No Diff);  Future    4. Dyslipidemia  -     Lipid Panel; Future  -     Comprehensive Metabolic Panel; Future    5. Dribbling urine  -     Ambulatory Referral to Urology    6. CVD (cardiovascular disease)  -     Lipid Panel; Future  -     CBC (No Diff); Future  -     Comprehensive Metabolic Panel; Future    7. Abnormal blood chemistry  -     Lipid Panel; Future  -     CBC (No Diff); Future  -     Comprehensive Metabolic Panel; Future  -     Hemoglobin A1c; Future    8. Medication monitoring encounter  -     Lipid Panel; Future  -     CBC (No Diff); Future  -     Comprehensive Metabolic Panel; Future  -     Hemoglobin A1c; Future    9. At high risk for falls  Comments:  information via avs       Back on medicines, A1C and LDL were likely elevated from being out of medicine for a period. Recheck labs prior to next appointment.            Follow Up   Return in about 13 weeks (around 9/22/2025) for Medicare Wellness, Diabetes follow up, fasting labs 2-7 days prior to visit.  Patient was given instructions and counseling regarding his condition or for health maintenance advice. Please see specific information pulled into the AVS if appropriate.

## 2025-07-08 DIAGNOSIS — I10 ESSENTIAL HYPERTENSION: ICD-10-CM

## 2025-07-08 RX ORDER — LOSARTAN POTASSIUM AND HYDROCHLOROTHIAZIDE 12.5; 1 MG/1; MG/1
TABLET ORAL
Qty: 90 TABLET | Refills: 3 | Status: SHIPPED | OUTPATIENT
Start: 2025-07-08

## 2025-07-17 ENCOUNTER — OFFICE VISIT (OUTPATIENT)
Age: 70
End: 2025-07-17
Payer: MEDICARE

## 2025-07-17 ENCOUNTER — TELEPHONE (OUTPATIENT)
Age: 70
End: 2025-07-17

## 2025-07-17 DIAGNOSIS — N40.1 BPH WITH OBSTRUCTION/LOWER URINARY TRACT SYMPTOMS: Primary | Chronic | ICD-10-CM

## 2025-07-17 DIAGNOSIS — N13.8 BPH WITH OBSTRUCTION/LOWER URINARY TRACT SYMPTOMS: Primary | Chronic | ICD-10-CM

## 2025-07-17 DIAGNOSIS — N39.43 POST-VOID DRIBBLING: ICD-10-CM

## 2025-07-17 LAB
BILIRUB BLD-MCNC: NEGATIVE MG/DL
CLARITY, POC: CLEAR
COLOR UR: YELLOW
EXPIRATION DATE: ABNORMAL
GLUCOSE UR STRIP-MCNC: ABNORMAL MG/DL
KETONES UR QL: NEGATIVE
LEUKOCYTE EST, POC: NEGATIVE
Lab: ABNORMAL
NITRITE UR-MCNC: NEGATIVE MG/ML
PH UR: 6 [PH] (ref 5–8)
PROT UR STRIP-MCNC: NEGATIVE MG/DL
RBC # UR STRIP: NEGATIVE /UL
SP GR UR: 1.01 (ref 1–1.03)
UROBILINOGEN UR QL: ABNORMAL

## 2025-07-17 NOTE — PROGRESS NOTES
"     Follow Up Office Visit      Patient Name: Yehuda Capellan  : 1955   MRN: [id number]     Chief Complaint:    Chief Complaint   Patient presents with    Dribbling urine       Referring Provider: Cindy Zhang MD    History of Present Illness: Yehuda Capellan is a 70 y.o. male who presents today for follow up of post void dribbling.     He was seen by Dr. Molina 10/2023 after UTI dx in preparation for hip surgery. 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 He was lost to f/u after the cystoscopy. He is on Flomax. Urinary symptoms are overall stable. IPSS score is 12 with mixed quality of life. Urinary stream scored as \"4\" on survey but when asked about stream reports a good stream. He is not straining to urinate. Has nocturia x1, which is stable for him. He has some urinary urgency but no urge incontinence. He has post void dribbling, which is bothersome for him. He has to wear pads. He doesn't have DANIEL or UUI.     No dysuria or gross hematuria. No UTIs since 10/2023.   UA dip today  PVR     PSA normal at 1.56 25      Subjective      Review of System: Review of Systems   Genitourinary:         Postvoid dribbling   All other systems reviewed and are negative.     I have reviewed the ROS documented by my clinical staff, I have updated appropriately and I agree. Sofiya Kimble PA-C    I have reviewed and the following portions of the patient's history were updated as appropriate: past family history, past medical history, past social history, past surgical history and problem list.    Medications:     Current Outpatient Medications:     apixaban (Eliquis) 5 MG tablet tablet, Take 1 tablet by mouth Every 12 (Twelve) Hours., Disp: 60 tablet, Rfl: 1    atorvastatin (LIPITOR) 20 MG tablet, Take 1 tablet by mouth Every Night. To lower cholesterol and risk of stroke., Disp: 90 " tablet, Rfl: 3    Blood Glucose Calibration (Accu-Chek Darcy) solution, Use as directed. E11.9, Disp: 1 each, Rfl: 3    cetirizine (zyrTEC) 10 MG tablet, Take 1 tablet by mouth Daily., Disp: , Rfl:     Cyanocobalamin (Vitamin B-12) 1000 MCG sublingual tablet, Place 1 tablet under the tongue Daily., Disp: , Rfl:     dapagliflozin Propanediol (Farxiga) 10 MG tablet, Take 10 mg by mouth Daily., Disp: 90 tablet, Rfl: 3    gabapentin (NEURONTIN) 300 MG capsule, Take 1 capsule by mouth 3 (Three) Times a Day As Needed (neuropathy/nerve pain)., Disp: 270 capsule, Rfl: 1    glimepiride (Amaryl) 1 MG tablet, Take 3 tablets by mouth Every Morning Before Breakfast., Disp: 270 tablet, Rfl: 3    glucose blood (Accu-Chek Darcy Plus) test strip, CHECK GLUCOSE TWO TIMES A  DAY FOR DIABETES MELLITUS (Patient taking differently: CHECK GLUCOSE TWO TIMES A  DAY FOR DIABETES MELLITUS), Disp: 200 each, Rfl: 2    glucose monitor monitoring kit, ACCU-CHECK DARCY PLUS METER as requested., Disp: 1 each, Rfl: 1    Lancets Misc. misc, TEST TWICE A DAY FOR DM. E11.9. patient requests acc-check softclick, Disp: 200 each, Rfl: 3    losartan-hydrochlorothiazide (HYZAAR) 100-12.5 MG per tablet, TAKE ONE TABLET BY MOUTH EVERY DAY FOR HIGH BLOOD PRESSURE DISORDER, Disp: 90 tablet, Rfl: 3    metoprolol tartrate (LOPRESSOR) 50 MG tablet, TAKE ONE TABLET BY MOUTH TWICE A DAY, Disp: 180 tablet, Rfl: 3    multivitamin with minerals tablet tablet, Take 1 tablet by mouth Daily., Disp: , Rfl:     ondansetron ODT (ZOFRAN-ODT) 8 MG disintegrating tablet, Place 1 tablet on the tongue Every 8 (Eight) Hours As Needed for Nausea or Vomiting., Disp: 30 tablet, Rfl: 0    tiZANidine (ZANAFLEX) 4 MG tablet, Take 1 tablet by mouth At Night As Needed for Muscle Spasms., Disp: 90 tablet, Rfl: 3    traZODone (DESYREL) 50 MG tablet, Take 1 tablet by mouth Every Night., Disp: 90 tablet, Rfl: 3    Allergies:   Allergies   Allergen Reactions    Metformin Diarrhea    Sulfa  Antibiotics Unknown - Low Severity     Patient states he had allergic reaction to sulfa drugs when he was a child.       IPSS Questionnaire (AUA-7):  Over the past month…    1)  Incomplete Emptying:       How often have you had a sensation of not emptying you had the sensation of not emptying your bladder completely after you finished urinating?  0 - Not at all   2)  Frequency:       How often have you had the urinate again less than two hours after you finished urinating?  2 - Less than half the time   3)  Intermittency:       How often have you found you stopped and started again several times when you urinated?   1 - Less than 1 time in 5   4) Urgency:      How often have you found it difficult to postpone urination?  4 - More than half the time   5) Weak Stream:      How often have you had a weak urinary stream?  4 - More than half the time   6) Straining:       How often have you had to push or strain to begin urination?  0 - Not at all   7) Nocturia:      How many times did you most typically get up to urinate from the time you went to bed at night until the time you got up in the morning?  1 - 1 time   Total Score:  12   The International Prostate Symptom Score (IPSS) is used to screen, diagnose, track symptoms of benign prostatic hyperplasia (BPH).   0-7 (Mild Symptoms) 8-19 (Moderate) 20-35 (Severe)   Quality of Life (QoL):  If you were to spend the rest of your life with your urinary condition just the way it is now, how would you feel about that? 3-Mixed   Urine Leakage (Incontinence) 0-No Leakage       Objective     Physical Exam:   Vital Signs: There were no vitals filed for this visit.  There is no height or weight on file to calculate BMI.     Physical Exam  Vitals and nursing note reviewed.   Constitutional:       General: He is not in acute distress.     Appearance: Normal appearance. He is not ill-appearing.   HENT:      Head: Normocephalic and atraumatic.   Neurological:      Mental Status: He is  alert.   Psychiatric:         Mood and Affect: Mood normal.         Behavior: Behavior normal.         Thought Content: Thought content normal.         Labs:   Brief Urine Lab Results       None                 Lab Results   Component Value Date    GLUCOSE 155 (H) 06/18/2025    CALCIUM 9.9 06/18/2025     06/18/2025    K 4.8 06/18/2025    CO2 27.1 06/18/2025     06/18/2025    BUN 22.0 06/18/2025    CREATININE 0.85 06/18/2025    EGFRIFAFRI 115 07/09/2021    EGFRIFNONA 95 07/09/2021    BCR 25.9 (H) 06/18/2025    ANIONGAP 11.4 01/30/2024       Lab Results   Component Value Date    WBC 8.43 06/18/2025    HGB 17.4 06/18/2025    HCT 52.1 (H) 06/18/2025    MCV 89.2 06/18/2025     06/18/2025       Images:   No Images in the past 120 days found..    Measures:   Tobacco:   Yehuda Capellan  reports that he quit smoking about 34 years ago. His smoking use included cigarettes. He started smoking about 55 years ago. He has a 42 pack-year smoking history. He has been exposed to tobacco smoke. He has never used smokeless tobacco.     Assessment / Plan      Assessment/Plan:   70 y.o. male who presented today for follow up of post void dribbling. He has known BPH and is on Flomax for this. The only bothersome symptom for him is post-void dribbling. Otherwise urination/stream is stable, per pt report. He appears to be emptying bladder fairly well with PVR of 3 mL today. Has not had Karen. UA will be checked before he leaves today. Referring to PFPT for post void dribbling. If no improvement and if urinary stream is worsening we can discuss repeat cystoscopy and adding TRUS and Uroflow to re-evaluate for surgical intervention for BPH.     Diagnoses and all orders for this visit:    1. BPH with obstruction/lower urinary tract symptoms (Primary)    2. Post-void dribbling  -     Ambulatory Referral to Physical Therapy for Evaluation & Treatment           Follow Up:   Return in about 4 months (around  11/17/2025).      Sofiya Kimble PA-C  [facility] [place]

## 2025-07-26 DIAGNOSIS — G63 NEUROPATHY DUE TO MEDICAL CONDITION: ICD-10-CM

## 2025-07-26 DIAGNOSIS — E11.59 TYPE 2 DIABETES MELLITUS WITH OTHER CIRCULATORY COMPLICATION, WITHOUT LONG-TERM CURRENT USE OF INSULIN: ICD-10-CM

## 2025-07-28 NOTE — TELEPHONE ENCOUNTER
Rx Refill Note  Requested Prescriptions     Pending Prescriptions Disp Refills    gabapentin (NEURONTIN) 300 MG capsule [Pharmacy Med Name: GABAPENTIN 300MG CAPS] 270 capsule 1     Sig: TAKE ONE CAPSULE BY MOUTH THREE TIMES A DAY AS NEEDED FOR NEUROPATHY OR NERVE PAIN      Last office visit with prescribing clinician: 6/23/2025   Last telemedicine visit with prescribing clinician: Visit date not found   Next office visit with prescribing clinician: 9/29/2025                         Would you like a call back once the refill request has been completed: [] Yes [] No    If the office needs to give you a call back, can they leave a voicemail: [] Yes [] No    UDS not UTD    Kervin Bell MA  07/28/25, 09:01 EDT

## 2025-07-29 RX ORDER — GABAPENTIN 300 MG/1
CAPSULE ORAL
Qty: 270 CAPSULE | Refills: 1 | Status: SHIPPED | OUTPATIENT
Start: 2025-07-29

## 2025-08-06 DIAGNOSIS — I48.20 ATRIAL FIBRILLATION, CHRONIC: ICD-10-CM

## 2025-08-06 RX ORDER — APIXABAN 5 MG/1
5 TABLET, FILM COATED ORAL EVERY 12 HOURS SCHEDULED
Qty: 60 TABLET | Refills: 0 | Status: SHIPPED | OUTPATIENT
Start: 2025-08-06

## 2025-08-18 ENCOUNTER — OFFICE VISIT (OUTPATIENT)
Dept: ORTHOPEDIC SURGERY | Facility: CLINIC | Age: 70
End: 2025-08-18
Payer: MEDICARE

## 2025-08-18 VITALS
DIASTOLIC BLOOD PRESSURE: 74 MMHG | HEIGHT: 70 IN | SYSTOLIC BLOOD PRESSURE: 118 MMHG | BODY MASS INDEX: 29.35 KG/M2 | WEIGHT: 205 LBS

## 2025-08-18 DIAGNOSIS — M16.12 PRIMARY OSTEOARTHRITIS OF LEFT HIP: Primary | ICD-10-CM

## 2025-08-18 DIAGNOSIS — M25.552 ARTHRALGIA OF LEFT HIP: ICD-10-CM

## 2025-08-18 DIAGNOSIS — Z96.641 STATUS POST TOTAL HIP REPLACEMENT, RIGHT: ICD-10-CM

## 2025-08-18 PROCEDURE — 99213 OFFICE O/P EST LOW 20 MIN: CPT | Performed by: ORTHOPAEDIC SURGERY

## 2025-08-18 PROCEDURE — 3074F SYST BP LT 130 MM HG: CPT | Performed by: ORTHOPAEDIC SURGERY

## 2025-08-18 PROCEDURE — 1159F MED LIST DOCD IN RCRD: CPT | Performed by: ORTHOPAEDIC SURGERY

## 2025-08-18 PROCEDURE — 3078F DIAST BP <80 MM HG: CPT | Performed by: ORTHOPAEDIC SURGERY

## 2025-08-18 PROCEDURE — 1160F RVW MEDS BY RX/DR IN RCRD: CPT | Performed by: ORTHOPAEDIC SURGERY

## 2025-08-27 ENCOUNTER — HOSPITAL ENCOUNTER (OUTPATIENT)
Dept: GENERAL RADIOLOGY | Facility: HOSPITAL | Age: 70
Discharge: HOME | End: 2025-08-27
Payer: MEDICARE

## 2025-08-27 DIAGNOSIS — M16.12 PRIMARY OSTEOARTHRITIS OF LEFT HIP: ICD-10-CM

## 2025-08-27 PROCEDURE — 25010000002 LIDOCAINE 1 % SOLUTION: Performed by: ORTHOPAEDIC SURGERY

## 2025-08-27 PROCEDURE — 77002 NEEDLE LOCALIZATION BY XRAY: CPT

## 2025-08-27 PROCEDURE — 25010000002 METHYLPREDNISOLONE PER 80 MG: Performed by: ORTHOPAEDIC SURGERY

## 2025-08-27 RX ORDER — METHYLPREDNISOLONE ACETATE 80 MG/ML
80 INJECTION, SUSPENSION INTRA-ARTICULAR; INTRALESIONAL; INTRAMUSCULAR; SOFT TISSUE ONCE
Status: COMPLETED | OUTPATIENT
Start: 2025-08-27 | End: 2025-08-27

## 2025-08-27 RX ORDER — LIDOCAINE HYDROCHLORIDE 10 MG/ML
10 INJECTION, SOLUTION INFILTRATION; PERINEURAL ONCE
Status: COMPLETED | OUTPATIENT
Start: 2025-08-27 | End: 2025-08-27

## 2025-08-27 RX ADMIN — LIDOCAINE HYDROCHLORIDE 10 ML: 10 INJECTION, SOLUTION INFILTRATION; PERINEURAL at 14:40

## 2025-08-27 RX ADMIN — METHYLPREDNISOLONE ACETATE 80 MG: 80 INJECTION, SUSPENSION INTRA-ARTICULAR; INTRALESIONAL; INTRAMUSCULAR; SOFT TISSUE at 14:41

## (undated) DEVICE — VIOLET BRAIDED (POLYGLACTIN 910), SYNTHETIC ABSORBABLE SUTURE: Brand: COATED VICRYL

## (undated) DEVICE — SUT VIC 1/0 CT 36IN J359H BX/36

## (undated) DEVICE — HYBRID CO2 TUBING/CAP SET FOR OLYMPUS® SCOPES & CO2 SOURCE: Brand: ERBE

## (undated) DEVICE — SUT ETHIB 5 V37 30IN MB66G

## (undated) DEVICE — DRP SURG FULL 70X100IN STRL

## (undated) DEVICE — SUT VIC 2/0 CT1 27IN J259H

## (undated) DEVICE — SHEET,DRAPE,70X100,STERILE: Brand: MEDLINE

## (undated) DEVICE — GLV SURG SENSICARE SLT PF LF 8 STRL

## (undated) DEVICE — Device

## (undated) DEVICE — FLEXIBLE YANKAUER,MEDIUM TIP, NO VACUUM CONTROL: Brand: ARGYLE

## (undated) DEVICE — SINGLE-USE POLYPECTOMY SNARE: Brand: CAPTIVATOR II

## (undated) DEVICE — DRP SURG U/DRP INVISISHIELD PA 48X52IN

## (undated) DEVICE — ENDOSCOPY PORT CONNECTOR FOR OLYMPUS® SCOPES: Brand: ERBE

## (undated) DEVICE — 1000 S-DRAPE TOWEL DRAPE 10/BX: Brand: STERI-DRAPE™

## (undated) DEVICE — SLV SCD CALF HEMOFORCE DVT THERP REPROC MD

## (undated) DEVICE — TUBING, SUCTION, 1/4" X 12', STRAIGHT: Brand: MEDLINE

## (undated) DEVICE — DRSNG SURG AQUACEL AG 9X25CM

## (undated) DEVICE — PK HIP GEN 20

## (undated) DEVICE — PILLW ABD SM

## (undated) DEVICE — HANDPIECE SET WITH HIGH FLOW TIP AND SUCTION TUBE: Brand: INTERPULSE

## (undated) DEVICE — 2108 SERIES SAGITTAL BLADE, NO OFFSET  (24.8 X 1.24 X 80.1MM)

## (undated) DEVICE — QUICK CATCH IN-LINE SUCTION POLYP TRAP IS USED FOR SUCTION RETRIEVAL OF ENDOSCOPICALLY REMOVED POLYPS.

## (undated) DEVICE — GLV SURG SENSICARE PI ORTHO SZ8 LF STRL

## (undated) DEVICE — TBG PENCL TELESCP MEGADYNE SMOKE EVAC 10FT

## (undated) DEVICE — TBG SXN CONN/F UNIV 1/4IN 12FT LF STRL

## (undated) DEVICE — VLV SXN AIR/H2O ORCAPOD3 1P/U STRL

## (undated) DEVICE — SPNG LAP 12X12IN LF STRL PK/5